# Patient Record
Sex: MALE | Race: WHITE
[De-identification: names, ages, dates, MRNs, and addresses within clinical notes are randomized per-mention and may not be internally consistent; named-entity substitution may affect disease eponyms.]

---

## 2017-01-23 ENCOUNTER — HOSPITAL ENCOUNTER (EMERGENCY)
Dept: HOSPITAL 36 - ER | Age: 52
LOS: 1 days | Discharge: NURSING, REHAB OR LONG-TERM CARE | End: 2017-01-24
Payer: MEDICARE

## 2017-01-23 DIAGNOSIS — Z86.73: ICD-10-CM

## 2017-01-23 DIAGNOSIS — E11.9: ICD-10-CM

## 2017-01-23 DIAGNOSIS — E66.9: ICD-10-CM

## 2017-01-23 DIAGNOSIS — I10: ICD-10-CM

## 2017-01-23 DIAGNOSIS — J44.9: ICD-10-CM

## 2017-01-23 DIAGNOSIS — R07.89: Primary | ICD-10-CM

## 2017-01-23 DIAGNOSIS — I25.10: ICD-10-CM

## 2017-01-23 DIAGNOSIS — F41.9: ICD-10-CM

## 2017-01-23 DIAGNOSIS — J45.909: ICD-10-CM

## 2017-01-23 LAB
ALBUMIN/GLOB SERPL: 1.5 {RATIO} (ref 1–1.8)
ALP SERPL-CCNC: 77 U/L (ref 34–104)
ALT SERPL-CCNC: 46 U/L (ref 7–52)
ANION GAP SERPL CALC-SCNC: 10.5 MMOL/L (ref 7–16)
AST SERPL-CCNC: 30 U/L (ref 13–39)
BACTERIA #/AREA URNS HPF: (no result) /HPF
BILIRUB SERPL-MCNC: 0.3 MG/DL (ref 0.3–1)
BILIRUB UR-MCNC: NEGATIVE MG/DL
BUN SERPL-MCNC: 15 MG/DL (ref 7–25)
BUN/CREAT SERPL: 18.8
CALCIUM SERPL-MCNC: 9.4 MG/DL (ref 8.6–10.3)
CHLORIDE SERPL-SCNC: 98 MEQ/L (ref 98–107)
CHOLEST SERPL-MCNC: 246 MG/DL (ref ?–200)
CO2 SERPL-SCNC: 25.4 MEQ/L (ref 21–31)
COLOR UR: YELLOW
CREAT SERPL-MCNC: 0.8 MG/DL (ref 0.7–1.3)
EOSINOPHIL NFR BLD: 1 % (ref 0–5)
EPI CELLS URNS QL MICRO: (no result) /LPF
ERYTHROCYTE [DISTWIDTH] IN BLOOD BY AUTOMATED COUNT: 12.8 % (ref 11.5–20)
GLOBULIN SER-MCNC: 3 GM/DL
GLUCOSE SERPL-MCNC: 305 MG/DL (ref 70–105)
GLUCOSE UR STRIP-MCNC: 500 MG/DL
HCT VFR BLD CALC: 39.9 % (ref 39–49)
HGB BLD-MCNC: 13.9 GM/DL (ref 13.2–17.3)
INR PPP: 1.01 (ref 0.5–1.4)
KETONES UR STRIP-MCNC: (no result) MG/DL
MCH RBC QN AUTO: 30 PG (ref 26–30)
MCHC RBC AUTO-ENTMCNC: 34.7 PG (ref 28–36)
MCV RBC AUTO: 86.5 FL (ref 80–99)
NEUTROPHILS NFR BLD AUTO: 52 % (ref 40–80)
NEUTS BAND NFR BLD: 2 % (ref 0–10)
PH UR STRIP: 5.5 [PH]
PLATELET # BLD EST: ADEQUATE 10*3/UL
PLATELET # BLD: 230 TH/CMM (ref 150–400)
PMV BLD AUTO: 8.1 FL
POTASSIUM SERPL-SCNC: 3.9 MEQ/L (ref 3.5–5.1)
PROT UR STRIP-MCNC: NEGATIVE MG/DL
PROTHROMBIN TIME: 10 SECONDS (ref 9.5–11.5)
RBC # BLD AUTO: 4.61 MIL/CMM (ref 4.3–5.7)
RBC # UR STRIP: NEGATIVE /UL
RBC #/AREA URNS HPF: (no result) /HPF (ref 0–5)
SODIUM SERPL-SCNC: 130 MEQ/L (ref 136–145)
TOTAL CELLS COUNTED BLD: 100
TRIGL SERPL-MCNC: 1200 MG/DL (ref ?–150)
UROBILINOGEN UR STRIP-ACNC: 0.2 E.U./DL (ref 0.2–1)
WBC # BLD AUTO: 6.2 TH/CMM (ref 4.8–10.8)
WBC #/AREA URNS HPF: (no result) /HPF (ref 0–5)

## 2017-01-23 PROCEDURE — 93005 ELECTROCARDIOGRAM TRACING: CPT

## 2017-01-23 PROCEDURE — 84443 ASSAY THYROID STIM HORMONE: CPT

## 2017-01-23 PROCEDURE — 99285 EMERGENCY DEPT VISIT HI MDM: CPT

## 2017-01-23 PROCEDURE — 80053 COMPREHEN METABOLIC PANEL: CPT

## 2017-01-23 PROCEDURE — 36415 COLL VENOUS BLD VENIPUNCTURE: CPT

## 2017-01-23 PROCEDURE — 85007 BL SMEAR W/DIFF WBC COUNT: CPT

## 2017-01-23 PROCEDURE — 85027 COMPLETE CBC AUTOMATED: CPT

## 2017-01-23 PROCEDURE — 83036 HEMOGLOBIN GLYCOSYLATED A1C: CPT

## 2017-01-23 PROCEDURE — 80061 LIPID PANEL: CPT

## 2017-01-23 PROCEDURE — 84484 ASSAY OF TROPONIN QUANT: CPT

## 2017-01-23 PROCEDURE — 86592 SYPHILIS TEST NON-TREP QUAL: CPT

## 2017-01-23 PROCEDURE — 81001 URINALYSIS AUTO W/SCOPE: CPT

## 2017-01-23 PROCEDURE — 71010: CPT

## 2017-01-23 PROCEDURE — 87040 BLOOD CULTURE FOR BACTERIA: CPT

## 2017-01-23 PROCEDURE — 76700 US EXAM ABDOM COMPLETE: CPT

## 2017-01-23 PROCEDURE — 85610 PROTHROMBIN TIME: CPT
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
## 2017-01-24 LAB — HGB BLD-MCNC: 14 G/DL (ref 4–35)

## 2017-01-24 NOTE — DIAGNOSTIC IMAGING REPORT
Portable chest x-ray



HISTORY: Pain



The overall heart size is difficult to assess with portable technique.

Elevation the right hemidiaphragm. No acute focal pulmonary processes.

No hilar or mediastinal abnormalities. Degenerative changes seen to the

spine.



IMPRESSION:

1. No acute focal pulmonary processes

2. Elevation of the right hemidiaphragm

## 2017-01-24 NOTE — DIAGNOSTIC IMAGING REPORT
Abdominal ultrasound



HISTORY: Pain



Exam is very limited due to a large amount of bowel gas.



The liver appears enlarged. No obvious focal lesions. Incomplete

delineation of the entire liver. The gallbladder is rather markedly

distended. No definite intraluminal abnormalities. Significance should

be correlated clinically. The common bile duct could not be visualized.

No intrahepatic biliary dilatation. The kidneys appear increased in size

bilaterally. No focal lesions or hydronephrosis. The spleen is enlarged.

No other obvious retroperitoneal or intra-abdominal abnormalities.



IMPRESSION:

1. Very Limited/suboptimal exam due to considerable bowel gas

2. Hepatosplenomegaly

3. Dilated gallbladder. Significance and etiology is uncertain.

4. Increased renal sizes bilaterally. No hydronephrosis. Significance

should be correlated with renal function tests.

## 2017-02-17 ENCOUNTER — HOSPITAL ENCOUNTER (INPATIENT)
Dept: HOSPITAL 36 - ER | Age: 52
LOS: 3 days | Discharge: NURSING, REHAB OR LONG-TERM CARE | DRG: 392 | End: 2017-02-20
Attending: FAMILY MEDICINE | Admitting: FAMILY MEDICINE
Payer: MEDICARE

## 2017-02-17 DIAGNOSIS — N40.0: ICD-10-CM

## 2017-02-17 DIAGNOSIS — K27.9: ICD-10-CM

## 2017-02-17 DIAGNOSIS — Z90.49: ICD-10-CM

## 2017-02-17 DIAGNOSIS — K57.30: ICD-10-CM

## 2017-02-17 DIAGNOSIS — I69.354: ICD-10-CM

## 2017-02-17 DIAGNOSIS — Z82.49: ICD-10-CM

## 2017-02-17 DIAGNOSIS — E66.9: ICD-10-CM

## 2017-02-17 DIAGNOSIS — R10.9: ICD-10-CM

## 2017-02-17 DIAGNOSIS — E11.65: ICD-10-CM

## 2017-02-17 DIAGNOSIS — Z79.4: ICD-10-CM

## 2017-02-17 DIAGNOSIS — I10: ICD-10-CM

## 2017-02-17 DIAGNOSIS — E78.5: ICD-10-CM

## 2017-02-17 DIAGNOSIS — K29.70: Primary | ICD-10-CM

## 2017-02-17 DIAGNOSIS — R16.0: ICD-10-CM

## 2017-02-17 DIAGNOSIS — Z83.3: ICD-10-CM

## 2017-02-17 LAB
ANION GAP SERPL CALC-SCNC: 6.3 MMOL/L (ref 7–16)
BASOPHILS NFR BLD AUTO: 0.4 % (ref 0–2)
BUN SERPL-MCNC: 19 MG/DL (ref 7–25)
BUN/CREAT SERPL: 27.1
CALCIUM SERPL-MCNC: 8.8 MG/DL (ref 8.6–10.3)
CHLORIDE SERPL-SCNC: 103 MEQ/L (ref 98–107)
CO2 SERPL-SCNC: 26.4 MEQ/L (ref 21–31)
CREAT SERPL-MCNC: 0.7 MG/DL (ref 0.7–1.3)
EOSINOPHIL NFR BLD AUTO: 2.8 % (ref 0–5)
ERYTHROCYTE [DISTWIDTH] IN BLOOD BY AUTOMATED COUNT: 12.8 % (ref 11.5–20)
GLUCOSE SERPL-MCNC: 269 MG/DL (ref 70–105)
HCT VFR BLD CALC: 38.5 % (ref 39–49)
HGB BLD-MCNC: 12 G/DL (ref 4–35)
HGB BLD-MCNC: 13.2 GM/DL (ref 13.2–17.3)
LYMPHOCYTES NFR BLD AUTO: 30.1 % (ref 20–50)
MCH RBC QN AUTO: 29.7 PG (ref 26–30)
MCHC RBC AUTO-ENTMCNC: 34.2 PG (ref 28–36)
MCV RBC AUTO: 86.6 FL (ref 80–99)
MONOCYTES NFR BLD AUTO: 12.5 % (ref 2–10)
NEUTROPHILS # BLD: 3.3 TH/CMM (ref 1.8–8)
NEUTROPHILS NFR BLD AUTO: 54.2 % (ref 40–80)
PLATELET # BLD: 252 TH/CMM (ref 150–400)
PMV BLD AUTO: 7.9 FL
POTASSIUM SERPL-SCNC: 3.7 MEQ/L (ref 3.5–5.1)
RBC # BLD AUTO: 4.45 MIL/CMM (ref 4.3–5.7)
SODIUM SERPL-SCNC: 132 MEQ/L (ref 136–145)
WBC # BLD AUTO: 6.1 TH/CMM (ref 4.8–10.8)

## 2017-02-17 PROCEDURE — Z7610: HCPCS

## 2017-02-17 RX ADMIN — INSULIN ASPART SCH UNITS: 100 INJECTION, SOLUTION INTRAVENOUS; SUBCUTANEOUS at 23:24

## 2017-02-17 RX ADMIN — INSULIN ASPART SCH UNITS: 100 INJECTION, SOLUTION INTRAVENOUS; SUBCUTANEOUS at 11:49

## 2017-02-17 RX ADMIN — INSULIN ASPART SCH UNITS: 100 INJECTION, SOLUTION INTRAVENOUS; SUBCUTANEOUS at 17:33

## 2017-02-17 RX ADMIN — HYDROCODONE BITATRATE AND ACETAMINOPHEN PRN TAB: 10; 325 TABLET ORAL at 11:46

## 2017-02-17 RX ADMIN — HYDROCODONE BITATRATE AND ACETAMINOPHEN PRN TAB: 10; 325 TABLET ORAL at 18:20

## 2017-02-17 RX ADMIN — INSULIN ASPART SCH UNITS: 100 INJECTION, SOLUTION INTRAVENOUS; SUBCUTANEOUS at 11:46
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
## 2017-02-17 NOTE — ADMIT CRITERIA FORM
Admit Criteria Forms





- Admit Criteria


Diagnosis: 





                                                                         


                           ABDOMINAL PAIN





Clinical Indications for Admission to Inpatient Care


 


                                                                               

      (Place 'X' for any and all applicable criteria):





Admission is indicated for ANY ONE of the following(1)(2)(3)(4)(5):


[X ]I.      Inpatient admission required rather than observation care (Also use 

Abdominal Pain: Observation Care, 


           as appropriate) because of ANY ONE of the following:


            [ ]a)   Severe pain requiring acute inpatient management


            [X ]b)    Identification of etiology/finding that requires 

inpatient care (eg, aortic dissection, free air)


            [ ]c)    Absent bowel sounds with complete ileus(6)  


            [ ]d)    Suspected toxic megacolon


            [ ]e)    Severe electrolyte abnormalities requiring inpatient care


            [ ]f)     High fever or infection requiring inpatient admission as 

indicated by ANY ONE of following(7)(8):


                       [ ] i)    Appropriate outpatient or observational care 

antimicrobial treatment unavailable, not effective, or not feasible


                       [ ] ii)   Documented bacteremia 


                       [ ] iii)  Temperature > 104.9 degrees F (oral)


                       [ ] iv)  T >103.1 F (oral) or < 96.8 F(rectal) that does 

not respond to all emergency treatment measures


            [ ]g)     Signs of intestinal obstruction [B] 


            [ ]h)     Hemodynamic instability


            [ ]i)      IV fluid to replace significant ongoing losses (greater 

than 3 L/m2 per day) (12)(13)


            [ ]j)      Percutaneous or open drainage (eg, abscess, biliary tract

) procedures


            [ ]k)     Parenteral nutrition regimen that must be implemented on 

inpatient basis   


            [ ]l)      Other condition,treatment or monitoring requiring 

inpatient admission.


[ ]II.      Peritoneal signs present


[ ]III.     Surgery needed that cannot be performed on an ambulatory basis.


[ ]IV.    Evaluation requires patient to not eat or drink for extended period (

eg, more than 24 hours).





[ ]V.     Contraindications and/or Inappropriate clinical situations for 

Observational Care in patients with


           abdominal pain, when ANY ONE of the following is required:


           [ ]a)  Thorough evaluation is required to prevent catastrophic 

events due to delays in diagnosing  


                   (e.g.Mesenteric ischemia) 1,3


           [ ]b)  Patient with severe pathology or with chronic symptoms 

unlikely to improve in the ED stay (3)


[ ]VI.    General contraindications and/or Inappropriate clinical situations 

for Observational Care in patients


           with abdominal pain, when ANY ONE of the following is required:


           [ ]a)   Prediction of prolongation of LOS based on ANY ONE of the 

following may be considered as 


                    a contraindication for observational care 2, 3, 4, 5, 6, 7, 

8, 9, 10, 11


                    [ ]i)    Age > 65 yrs.


                    [ ]ii)   Patient arriving by ambulance


                    [ ]iii)  Patient with high acuity


                    [ ]iv)  Patient requiring vital sign monitoring


                    [ ]v)   Patient on IV medication


           [ ]b)   Systolic blood pressures  180mmHg 3,12


           [ ]c)   Patient with altered mental status including delirium and 

other alteration of consciousness, (3)


           [ ]d)   Patient whose discharge disposition will be to a skilled 

nursing home or rehabilitation home should 


                    not be managed in Emergency Department Observation Unit. 

CMS rule requires 3 days hospital stay before such placement.3,13


           [ ]e)   Patient with failure to thrive due to broad array of 

etiologies 3,16,17


           [ ]f)    Inability to ambulate 3,14








Extended stay beyond goal length of stay may be needed for(2)(3):


[ ]a)   Persistent abdominal pain with suspected intra-abdominal process


[ ]b)   Diagnosed condition requiring continued stay (e.g., pancreatitis, 

complicated diverticulitis)                                            


[ ]c)   Surgery (e.g., colectomy)                


    





The original [facility] content created by [employer] has been revised. 


The portions of the content which have been revised are identified through the 

use of italic text or in bold, and [facility] 


has neither reviewed nor approved the modified material.All other unmodified 

content is copyright  [facility].





Please see references footnoted in the original [facility] edition 

2016


Admit Criteria Met?: Yes
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
## 2017-02-18 LAB
ALBUMIN/GLOB SERPL: 1.6 {RATIO} (ref 1–1.8)
ALP SERPL-CCNC: 62 U/L (ref 34–104)
ALT SERPL-CCNC: 36 U/L (ref 7–52)
ANION GAP SERPL CALC-SCNC: 8.9 MMOL/L (ref 7–16)
AST SERPL-CCNC: 27 U/L (ref 13–39)
BACTERIA #/AREA URNS HPF: (no result) /HPF
BASOPHILS NFR BLD AUTO: 1.1 % (ref 0–2)
BILIRUB DIRECT SERPL-MCNC: 0.07 MG/DL (ref 0–0.2)
BILIRUB SERPL-MCNC: 0.3 MG/DL (ref 0.3–1)
BILIRUB UR-MCNC: NEGATIVE MG/DL
BUN SERPL-MCNC: 15 MG/DL (ref 7–25)
BUN/CREAT SERPL: 25
CALCIUM SERPL-MCNC: 9 MG/DL (ref 8.6–10.3)
CHLORIDE SERPL-SCNC: 100 MEQ/L (ref 98–107)
CHOLEST SERPL-MCNC: 247 MG/DL (ref ?–200)
CO2 SERPL-SCNC: 25.7 MEQ/L (ref 21–31)
COLOR UR: YELLOW
CREAT SERPL-MCNC: 0.6 MG/DL (ref 0.7–1.3)
EOSINOPHIL NFR BLD AUTO: 2.8 % (ref 0–5)
EPI CELLS URNS QL MICRO: (no result) /LPF
ERYTHROCYTE [DISTWIDTH] IN BLOOD BY AUTOMATED COUNT: 12.6 % (ref 11.5–20)
GLOBULIN SER-MCNC: 2.5 GM/DL
GLUCOSE SERPL-MCNC: 261 MG/DL (ref 70–105)
GLUCOSE UR STRIP-MCNC: 500 MG/DL
HCT VFR BLD CALC: 39 % (ref 39–49)
HGB BLD-MCNC: 13.5 GM/DL (ref 13.2–17.3)
KETONES UR STRIP-MCNC: NEGATIVE MG/DL
LYMPHOCYTES NFR BLD AUTO: 30.2 % (ref 20–50)
MCH RBC QN AUTO: 30 PG (ref 26–30)
MCHC RBC AUTO-ENTMCNC: 34.6 PG (ref 28–36)
MCV RBC AUTO: 86.7 FL (ref 80–99)
MONOCYTES NFR BLD AUTO: 10.6 % (ref 2–10)
NEUTROPHILS # BLD: 3.2 TH/CMM (ref 1.8–8)
NEUTROPHILS NFR BLD AUTO: 55.3 % (ref 40–80)
PH UR STRIP: 6 [PH]
PLATELET # BLD: 245 TH/CMM (ref 150–400)
PMV BLD AUTO: 8.1 FL
POTASSIUM SERPL-SCNC: 3.6 MEQ/L (ref 3.5–5.1)
PROT UR STRIP-MCNC: NEGATIVE MG/DL
RBC # BLD AUTO: 4.49 MIL/CMM (ref 4.3–5.7)
RBC # UR STRIP: NEGATIVE /UL
RBC #/AREA URNS HPF: (no result) /HPF (ref 0–5)
SODIUM SERPL-SCNC: 131 MEQ/L (ref 136–145)
TRIGL SERPL-MCNC: 696 MG/DL (ref ?–150)
UROBILINOGEN UR STRIP-ACNC: 0.2 E.U./DL (ref 0.2–1)
WBC # BLD AUTO: 5.9 TH/CMM (ref 4.8–10.8)
WBC #/AREA URNS HPF: (no result) /HPF (ref 0–5)

## 2017-02-18 RX ADMIN — HYDROCODONE BITATRATE AND ACETAMINOPHEN PRN TAB: 10; 325 TABLET ORAL at 20:24

## 2017-02-18 RX ADMIN — INSULIN ASPART SCH UNITS: 100 INJECTION, SOLUTION INTRAVENOUS; SUBCUTANEOUS at 06:33

## 2017-02-18 RX ADMIN — HYDROCODONE BITATRATE AND ACETAMINOPHEN PRN TAB: 10; 325 TABLET ORAL at 06:39

## 2017-02-18 RX ADMIN — HYDROCODONE BITATRATE AND ACETAMINOPHEN PRN TAB: 10; 325 TABLET ORAL at 11:35

## 2017-02-18 RX ADMIN — INSULIN ASPART SCH UNITS: 100 INJECTION, SOLUTION INTRAVENOUS; SUBCUTANEOUS at 11:31

## 2017-02-18 RX ADMIN — PANTOPRAZOLE SODIUM SCH MG: 40 TABLET, DELAYED RELEASE ORAL at 06:33

## 2017-02-18 RX ADMIN — INSULIN ASPART SCH UNITS: 100 INJECTION, SOLUTION INTRAVENOUS; SUBCUTANEOUS at 22:02

## 2017-02-18 RX ADMIN — INSULIN ASPART SCH UNITS: 100 INJECTION, SOLUTION INTRAVENOUS; SUBCUTANEOUS at 16:45
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
## 2017-02-18 NOTE — HISTORY & PHYSICAL
This note covering Dr. Varela.



CHIEF COMPLAINT:  Epigastric pain for the last 2 months, getting worse recently.



HISTORY OF PRESENT ILLNESS:  The patient is a 51-year-old male with a past

medical history of hypertension, diabetes mellitus, CVA 1 year ago with residual

left-sided weakness, presented with epigastric pain of 2 months, getting worse

recently.  It is stabbing in nature.  On initial evaluation, the patient's

temperature 98.1 degree Fahrenheit and WBC count was 6100.  Creatinine was 0.7. 

Glucose is 269.  HbA1c 11.3%.  CT scan of the abdomen and pelvis performed and

it showed colonic diverticulosis without evidence of diverticulitis.  GI

consultation and Cardiology consultation were called.



ALLERGIES:  NKDA.



MEDICATIONS:  See medication reconciliation sheet.



PAST MEDICAL HISTORY:  Includes diabetes mellitus type 2, hypertension, CVA with

left-sided weakness 1 year ago.



FAMILY HISTORY:  Heart disease, diabetes mellitus and hypertension.



SOCIAL HISTORY:  Denies any smoking, alcohol or drug use.



PAST SURGICAL HISTORY:  The patient had laparoscopic surgery.  The patient had

exploratory laparotomy performed for diaphragmatic hernia, repair of spleen and

colon, mandibular fracture, appendectomy.



PSYCHIATRIC HISTORY:  None.



MEDICATIONS:  As per medication reconciliation sheet.



REVIEW OF SYSTEMS:

GENERAL:  The patient denies any fever or chills.

HEENT:  Denies diplopia, photophobia, sore throat.

RESPIRATORY:  Denies any cough or shortness of breath.

CARDIOVASCULAR:  Denies any chest pain, no palpitation.

GASTROINTESTINAL:  The patient has epigastric abdominal pain.  Denies any

nausea, vomiting, diarrhea, hematemesis or hematochezia.

GENITOURINARY:  Denies dysuria or hematuria.

NEUROLOGIC:  Denies headache, dizziness, or focal weakness.



PHYSICAL EXAMINATION:

VITAL SIGNS:  Shows temperature is 98.4 degrees Fahrenheit, pulse 71,

respirations 19, blood pressure 163/94.

GENERAL:  The patient is comfortable lying in the bed, not in acute distress.

HEENT:  Head is normocephalic, atraumatic.  Oral cavity moist, pink tongue. 

Eyes:  No pallor, no icterus.  PERRLA, EOMI.

NECK:  Supple, no JVD, no carotid bruit.  Trachea in midline.

CHEST:  Bilateral breath sounds.  No crackles or wheezing.

HEART:  S1, S2 within normal limits.  Regular rhythm.  No murmur.  No gallop.

ABDOMEN:  Soft.  The patient has mild tenderness on epigastric area.  The

patient has healed old surgical scar from xiphoid process to the suprapubic area

healed completely, no opening.  Bowel sounds present.

EXTREMITIES:  No cyanosis, no clubbing, no edema.

NEUROLOGICAL:  Alert, awake, oriented x 3.



LABORATORY DATA:  Current lab shows WBC count is 6100, hemoglobin 13.2,

hematocrit 38.5, platelets are 252,000, neutrophil is 54.2%.  Sodium 132,

potassium 3.7, chloride ____, bicarb 26, BUN is 19, creatinine 0.7, glucose is

269.  Urinalysis shows negative nitrite, negative leuko esterase.  CT scan of

the abdomen and pelvis suggested diverticulosis without evidence of

diverticulitis.



IMPRESSION:

1.  Abdominal pain, rule out peptic ulcer disease.

2.  Diabetes mellitus, uncontrolled.

3.  Hypertension.

4.  History of cerebrovascular accident.

5.  Obesity.



RECOMMENDATIONS:  Continue same medications.  GI consultation and Cardiology

consultations were called.





DD: 02/18/2017 13:02

DT: 02/18/2017 13:58

JOB# [id number]  [id number]

## 2017-02-18 NOTE — GENERAL PROGRESS NOTE
Subjective





- Review of Systems


Service Date: 02/18/17


Subjective: 





[id number]





Objective





- Results


Result Diagrams: 


 02/18/17 06:25





 02/18/17 06:25


Recent Labs: 


 Laboratory Last Values











WBC  5.9 Th/cmm (4.8-10.8)   02/18/17  06:25    


 


RBC  4.49 Mil/cmm (4.30-5.70)   02/18/17  06:25    


 


Hgb  13.5 gm/dL (13.2-17.3)   02/18/17  06:25    


 


Hct  39.0 % (39.0-49.0)   02/18/17  06:25    


 


MCV  86.7 fl (80-99)   02/18/17  06:25    


 


MCH  30.0 pg (26.0-30.0)   02/18/17  06:25    


 


MCHC Differential  34.6 pg (28.0-36.0)   02/18/17  06:25    


 


RDW  12.6 % (11.5-20.0)   02/18/17  06:25    


 


Plt Count  245 Th/cmm (150-400)   02/18/17  06:25    


 


MPV  8.1 fl  02/18/17  06:25    


 


Neutrophils %  55.3 % (40.0-80.0)   02/18/17  06:25    


 


Lymphocytes %  30.2 % (20.0-50.0)   02/18/17  06:25    


 


Monocytes %  10.6 % (2.0-10.0)  H  02/18/17  06:25    


 


Eosinophils %  2.8 % (0.0-5.0)   02/18/17  06:25    


 


Basophils %  1.1 % (0.0-2.0)   02/18/17  06:25    


 


ESR  33 mm/hr (0-20)  H  02/18/17  06:25    


 


Sodium  131 mEq/L (136-145)  L  02/18/17  06:25    


 


Potassium  3.6 mEq/L (3.5-5.1)   02/18/17  06:25    


 


Chloride  100 mEq/L ()   02/18/17  06:25    


 


Carbon Dioxide  25.7 mEq/L (21.0-31.0)   02/18/17  06:25    


 


Anion Gap  8.9  (7.0-16.0)   02/18/17  06:25    


 


BUN  15 mg/dL (7-25)   02/18/17  06:25    


 


Creatinine  0.6 mg/dL (0.7-1.3)  L  02/18/17  06:25    


 


Est GFR ( Amer)  > 60.0 ml/min (>90)   02/18/17  06:25    


 


Est GFR (Non-Af Amer)  > 60.0 ml/min  02/18/17  06:25    


 


BUN/Creatinine Ratio  25.0   02/18/17  06:25    


 


Glucose  261 mg/dL ()  H  02/18/17  06:25    


 


POC Glucose  283 MG/DL (70 - 105)  H  02/18/17  11:04    


 


Hemoglobin A1c %  11.3 % (4.0-6.0)  H  02/17/17  06:30    


 


Calcium  9.0 mg/dL (8.6-10.3)   02/18/17  06:25    


 


Total Bilirubin  0.3 mg/dL (0.3-1.0)   02/18/17  06:25    


 


Direct Bilirubin  0.07 mg/dL (0.0-0.2)   02/18/17  06:25    


 


AST  27 U/L (13-39)   02/18/17  06:25    


 


ALT  36 U/L (7-52)   02/18/17  06:25    


 


Alkaline Phosphatase  62 U/L ()   02/18/17  06:25    


 


Total Protein  6.5 gm/dL (6.0-8.3)   02/18/17  06:25    


 


Albumin  4.0 gm/dL (4.2-5.5)  L  02/18/17  06:25    


 


Globulin  2.5 gm/dL  02/18/17  06:25    


 


Albumin/Globulin Ratio  1.6  (1.0-1.8)   02/18/17  06:25    


 


Triglycerides  696 mg/dL (<150)  H  02/18/17  06:25    


 


Cholesterol  247 mg/dL (<200)  H  02/18/17  06:25    


 


LDL Cholesterol Direct  137 mg/dL ()   02/18/17  06:25    


 


HDL Cholesterol  24 mg/dL (23-92)   02/18/17  06:25    


 


Lipase  70 U/L (11-82)   02/17/17  06:30    


 


TSH  2.23 uIU/ml (0.34-5.60)   02/18/17  06:25    


 


Urine Source  RANDOM   02/18/17  01:54    


 


Urine Color  YELLOW   02/18/17  01:54    


 


Urine Clarity  CLEAR  (CLEAR)   02/18/17  01:54    


 


Urine pH  6.0   02/18/17  01:54    


 


Ur Specific Gravity  1.010  (1.005-1.030)   02/18/17  01:54    


 


Urine Protein  NEGATIVE mg/dL (NEGATIVE)   02/18/17  01:54    


 


Urine Glucose (UA)  500 mg/dL (NEGATIVE)  H  02/18/17  01:54    


 


Urine Ketones  NEGATIVE mg/dL (NEGATIVE)   02/18/17  01:54    


 


Urine Blood  NEGATIVE  (NEGATIVE)   02/18/17  01:54    


 


Urine Nitrate  NEGATIVE  (NEGATIVE)   02/18/17  01:54    


 


Urine Bilirubin  NEGATIVE  (NEGATIVE)   02/18/17  01:54    


 


Urine Urobilinogen  0.2 E.U./dL (0.2 - 1.0)   02/18/17  01:54    


 


Ur Leukocyte Esterase  NEGATIVE  (NEGATIVE)   02/18/17  01:54    


 


Urine RBC  NONE SEEN /hpf (0-5)   02/18/17  01:54    


 


Urine WBC  NONE SEEN /hpf (0-5)   02/18/17  01:54    


 


Ur Epithelial Cells  NONE SEEN /lpf (FEW)   02/18/17  01:54    


 


Urine Bacteria  NONE SEEN /hpf (NONE SEEN)   02/18/17  01:54    














- Physical Exam


Vitals and I&O: 


 Vital Signs











Temp  98.4 F   02/18/17 12:00


 


Pulse  71   02/18/17 12:00


 


Resp  19   02/18/17 12:00


 


BP  163/94   02/18/17 12:00


 


Pulse Ox  96   02/18/17 12:00








 Intake & Output











 02/17/17 02/18/17 02/18/17





 18:59 06:59 18:59


 


Intake Total  1100 


 


Balance  1100 


 


Intake:   


 


  Oral  1100 


 


Other:   


 


  # Voids  2 


 


  Stool Characteristics   Soft











Active Medications: 


 Current Medications





Acetaminophen (Tylenol)  650 mg PO Q4H PRN


   PRN Reason: pain or fever


   Stop: 04/18/17 15:09


Acetaminophen/Hydrocodone Bitart (Norco 10 Mg/325 Mg)  1 tab PO Q4H PRN


   PRN Reason: abdominal pain scale of 5-8


   Stop: 04/18/17 11:26


   Last Admin: 02/18/17 11:35 Dose:  1 tab


Clopidogrel Bisulfate (Plavix)  75 mg PO DAILY [facility]


   Stop: 04/19/17 08:59


   Last Admin: 02/18/17 08:28 Dose:  75 mg


Famotidine (Pepcid)  20 mg PO BID [facility]


   Stop: 04/18/17 16:59


   Last Admin: 02/18/17 08:28 Dose:  20 mg


Furosemide (Lasix)  40 mg PO DAILY [facility]


   Stop: 04/19/17 08:59


   Last Admin: 02/18/17 08:28 Dose:  40 mg


Gabapentin (Neurontin)  600 mg PO BID [facility]


   Stop: 04/18/17 16:59


   Last Admin: 02/18/17 08:29 Dose:  600 mg


Gemfibrozil (Lopid)  600 mg PO BIDAC [facility]


   Stop: 04/18/17 16:29


   Last Admin: 02/18/17 06:33 Dose:  600 mg


Hydralazine HCl (Apresoline)  25 mg PO TID [facility]


   Stop: 04/18/17 15:59


   Last Admin: 02/18/17 08:28 Dose:  25 mg


Insulin Aspart (Novolog Insulin Sliding Scale)  0 units SUBQ ACHS [facility]


   PRN Reason: Protocol


   Stop: 04/19/17 11:29


   Last Admin: 02/18/17 11:31 Dose:  7 units


Losartan Potassium (Cozaar)  100 mg PO DAILY [facility]


   Stop: 04/18/17 16:59


   Last Admin: 02/18/17 08:27 Dose:  100 mg


Magnesium Hydroxide (Milk Of Magnesia)  30 ml PO HS PRN


   PRN Reason: Constipation


   Stop: 04/18/17 15:15


Metformin HCl (Glucophage)  850 mg PO BIDWM [facility]


   Stop: 04/18/17 17:59


   Last Admin: 02/18/17 08:27 Dose:  850 mg


Methocarbamol (Robaxin)  500 mg PO TID PRN


   PRN Reason: MUSCLE SPASM


   Stop: 04/18/17 15:08


Pantoprazole Sodium (Protonix)  40 mg PO QDAC [facility]


   Stop: 04/19/17 07:29


   Last Admin: 02/18/17 06:33 Dose:  40 mg


Zolpidem Tartrate (Ambien)  10 mg PO HS PRN


   PRN Reason: Insomnia


   Stop: 04/18/17 20:51


   Last Admin: 02/17/17 21:06 Dose:  10 mg

## 2017-02-19 RX ADMIN — HYDROCODONE BITATRATE AND ACETAMINOPHEN PRN TAB: 10; 325 TABLET ORAL at 13:39

## 2017-02-19 RX ADMIN — HYDROCODONE BITATRATE AND ACETAMINOPHEN PRN TAB: 10; 325 TABLET ORAL at 20:27

## 2017-02-19 RX ADMIN — INSULIN ASPART SCH UNITS: 100 INJECTION, SOLUTION INTRAVENOUS; SUBCUTANEOUS at 16:27

## 2017-02-19 RX ADMIN — PANTOPRAZOLE SODIUM SCH: 40 TABLET, DELAYED RELEASE ORAL at 06:35

## 2017-02-19 RX ADMIN — INSULIN ASPART SCH: 100 INJECTION, SOLUTION INTRAVENOUS; SUBCUTANEOUS at 06:34

## 2017-02-19 RX ADMIN — INSULIN ASPART SCH UNITS: 100 INJECTION, SOLUTION INTRAVENOUS; SUBCUTANEOUS at 20:27

## 2017-02-19 RX ADMIN — INSULIN ASPART SCH: 100 INJECTION, SOLUTION INTRAVENOUS; SUBCUTANEOUS at 11:14

## 2017-02-19 NOTE — CONSULTATION
The patient of Dr. Varela.



HISTORY AND PHYSICAL:  This is a 51-year-old male patient who has been

complaining of abdominal pain, no diarrhea and no rectal bleeding.  Following

this the patient came to the Emergency Room.  The patient's cardiac consult is

requested in view of hypertension.



PAST MEDICAL HISTORY:  Diabetes mellitus type 2, CVA with late effect,

left-sided weakness, walks with a walker, diverticulosis, peptic ulcer disease

and BPH.



FAMILY HISTORY:  Unremarkable.



SOCIAL HISTORY:  No history of smoking, alcohol abuse.



ALLERGIES:  No known allergies.



PHYSICAL EXAMINATION:

VITAL SIGNS:  Blood pressure 130/80, pulse 80 and respirations 20.

HEAD:  Normocephalic.  No lumps or bumps.

EYES:  Pupils equal, reactive to light.  Fundi show AV nicking, sclerae white,

conjunctivae pink.

NECK:  Carotid 2+.  Normal upstroke.  JVD flat.  Thyroid not palpable.  Lymph

nodes not palpable.

CHEST:  Shows increased AP diameter.  No kyphosis, scoliosis.

LUNGS:  Bilateral bronchovascular breath sounds.  Occasional wheeze.  No rales.

HEART:  PMI fifth intercostal space with lateral-to-midclavicular line.  S1 and

S2.  No S3, S4, soft systolic murmur.

ABDOMEN:  Soft.  There is tenderness in epigastric area, left lower quadrant. 

No rebound tenderness.

NEUROLOGIC:  Left-sided weakness.



CLINICAL IMPRESSION:

1.  Peptic ulcer disease, hypertension, diabetes mellitus type 2, CVA with late

effect left-sided weakness and diverticulosis.



PLAN:  The patient to continue present management, IV antibiotics and control

the blood pressure.





DD: 02/18/2017 20:13

DT: 02/19/2017 18:02

JOB# [id number]  [id number]

## 2017-02-19 NOTE — INFECTIOUS DISEASE PROG NOTE
Infectious Disease Subjective





- Review of Systems


Service Date: 17


Subjective: 





C/o abdominal pain. 





Infectious Disease Objective





- Results


Result Diagrams: 


 17 06:25





 17 06:25


Recent Labs: 


 Laboratory Last Values











WBC  5.9 Th/cmm (4.8-10.8)   17  06:25    


 


RBC  4.49 Mil/cmm (4.30-5.70)   17  06:25    


 


Hgb  13.5 gm/dL (13.2-17.3)   17  06:25    


 


Hct  39.0 % (39.0-49.0)   17  06:25    


 


MCV  86.7 fl (80-99)   17  06:25    


 


MCH  30.0 pg (26.0-30.0)   17  06:25    


 


MCHC Differential  34.6 pg (28.0-36.0)   17  06:25    


 


RDW  12.6 % (11.5-20.0)   17  06:25    


 


Plt Count  245 Th/cmm (150-400)   17  06:25    


 


MPV  8.1 fl  17  06:25    


 


Neutrophils %  55.3 % (40.0-80.0)   17  06:25    


 


Lymphocytes %  30.2 % (20.0-50.0)   17  06:25    


 


Monocytes %  10.6 % (2.0-10.0)  H  17  06:25    


 


Eosinophils %  2.8 % (0.0-5.0)   17  06:25    


 


Basophils %  1.1 % (0.0-2.0)   17  06:25    


 


ESR  33 mm/hr (0-20)  H  17  06:25    


 


Sodium  131 mEq/L (136-145)  L  17  06:25    


 


Potassium  3.6 mEq/L (3.5-5.1)   17  06:25    


 


Chloride  100 mEq/L ()   17  06:25    


 


Carbon Dioxide  25.7 mEq/L (21.0-31.0)   17  06:25    


 


Anion Gap  8.9  (7.0-16.0)   17  06:25    


 


BUN  15 mg/dL (7-25)   17  06:25    


 


Creatinine  0.6 mg/dL (0.7-1.3)  L  17  06:25    


 


Est GFR ( Amer)  > 60.0 ml/min (>90)   17  06:25    


 


Est GFR (Non-Af Amer)  > 60.0 ml/min  17  06:25    


 


BUN/Creatinine Ratio  25.0   17  06:25    


 


Glucose  261 mg/dL ()  H  17  06:25    


 


POC Glucose  321 MG/DL (70 - 105)  H  17  16:11    


 


Hemoglobin A1c %  11.3 % (4.0-6.0)  H  17  06:30    


 


Calcium  9.0 mg/dL (8.6-10.3)   17  06:25    


 


Total Bilirubin  0.3 mg/dL (0.3-1.0)   17  06:25    


 


Direct Bilirubin  0.07 mg/dL (0.0-0.2)   17  06:25    


 


AST  27 U/L (13-39)   17  06:25    


 


ALT  36 U/L (7-52)   17  06:25    


 


Alkaline Phosphatase  62 U/L ()   17  06:25    


 


C-Reactive Protein  0.7 mg/dL (0.0-0.9)   17  06:25    


 


Total Protein  6.5 gm/dL (6.0-8.3)   17  06:25    


 


Albumin  4.0 gm/dL (4.2-5.5)  L  17  06:25    


 


Globulin  2.5 gm/dL  17  06:25    


 


Albumin/Globulin Ratio  1.6  (1.0-1.8)   17  06:25    


 


Triglycerides  696 mg/dL (<150)  H  17  06:25    


 


Cholesterol  247 mg/dL (<200)  H  17  06:25    


 


LDL Cholesterol Direct  137 mg/dL ()   17  06:25    


 


HDL Cholesterol  24 mg/dL (23-92)   17  06:25    


 


Lipase  70 U/L (11-82)   17  06:30    


 


TSH  2.23 uIU/ml (0.34-5.60)   17  06:25    


 


Urine Source  RANDOM   17  01:54    


 


Urine Color  YELLOW   17  01:54    


 


Urine Clarity  CLEAR  (CLEAR)   17  01:54    


 


Urine pH  6.0   17  01:54    


 


Ur Specific Gravity  1.010  (1.005-1.030)   17  01:54    


 


Urine Protein  NEGATIVE mg/dL (NEGATIVE)   17  01:54    


 


Urine Glucose (UA)  500 mg/dL (NEGATIVE)  H  17  01:54    


 


Urine Ketones  NEGATIVE mg/dL (NEGATIVE)   17  01:54    


 


Urine Blood  NEGATIVE  (NEGATIVE)   17  01:54    


 


Urine Nitrate  NEGATIVE  (NEGATIVE)   17  01:54    


 


Urine Bilirubin  NEGATIVE  (NEGATIVE)   17  01:54    


 


Urine Urobilinogen  0.2 E.U./dL (0.2 - 1.0)   17  01:54    


 


Ur Leukocyte Esterase  NEGATIVE  (NEGATIVE)   17  01:54    


 


Urine RBC  NONE SEEN /hpf (0-5)   17  01:54    


 


Urine WBC  NONE SEEN /hpf (0-5)   17  01:54    


 


Ur Epithelial Cells  NONE SEEN /lpf (FEW)   17  01:54    


 


Urine Bacteria  NONE SEEN /hpf (NONE SEEN)   17  01:54    














- Physical Exam


Vitals and I&O: 


 Vital Signs











Temp  98.0 F   17 16:00


 


Pulse  75   17 16:00


 


Resp  20   17 16:00


 


BP  156/83   17 16:00


 


Pulse Ox  97   17 16:00








 Intake & Output











 17





 18:59 06:59 18:59


 


Intake Total 600 500 


 


Output Total 1000  


 


Balance -400 500 


 


Intake:   


 


  Oral 600 500 


 


Output:   


 


  Urine 1000  


 


Other:   


 


  # Voids  2 


 


  Stool Characteristics Soft  Soft











Active Medications: 


 Current Medications





Acetaminophen (Tylenol)  650 mg PO Q4H PRN


   PRN Reason: pain or fever


   Stop: 17 15:09


Acetaminophen/Hydrocodone Bitart (Norco 10 Mg/325 Mg)  1 tab PO Q4H PRN


   PRN Reason: abdominal pain scale of 5-8


   Stop: 17 11:26


   Last Admin: 17 13:39 Dose:  1 tab


Atorvastatin Calcium (Lipitor)  20 mg PO DAILY [facility]


   PRN Reason: Protocol


   Stop: 17 08:59


   Last Admin: 17 08:48 Dose:  20 mg


Clopidogrel Bisulfate (Plavix)  75 mg PO DAILY [facility]


   Stop: 17 08:59


   Last Admin: 17 08:48 Dose:  75 mg


Famotidine (Pepcid)  20 mg PO BID [facility]


   Stop: 17 16:59


   Last Admin: 17 16:18 Dose:  20 mg


Furosemide (Lasix)  40 mg PO DAILY [facility]


   Stop: 17 08:59


   Last Admin: 17 08:47 Dose:  40 mg


Gabapentin (Neurontin)  600 mg PO BID [facility]


   Stop: 17 16:59


   Last Admin: 17 16:18 Dose:  600 mg


Gemfibrozil (Lopid)  600 mg PO BIDAC [facility]


   Stop: 17 16:29


   Last Admin: 17 16:18 Dose:  600 mg


Hydralazine HCl (Apresoline)  25 mg PO TID [facility]


   Stop: 17 15:59


   Last Admin: 17 13:40 Dose:  25 mg


Insulin Aspart (Novolog Insulin Sliding Scale)  0 units SUBQ ACHS [facility]


   PRN Reason: Protocol


   Stop: 17 11:29


   Last Admin: 17 16:27 Dose:  9 units


Losartan Potassium (Cozaar)  100 mg PO DAILY [facility]


   Stop: 17 16:59


   Last Admin: 17 08:48 Dose:  100 mg


Magnesium Hydroxide (Milk Of Magnesia)  30 ml PO HS PRN


   PRN Reason: Constipation


   Stop: 17 15:15


Metformin HCl (Glucophage)  850 mg PO BIDWM [facility]


   Stop: 17 17:59


   Last Admin: 17 17:00 Dose:  850 mg


Methocarbamol (Robaxin)  500 mg PO TID PRN


   PRN Reason: MUSCLE SPASM


   Stop: 17 15:08


Pantoprazole Sodium (Protonix)  40 mg PO QDAC [facility]


   Stop: 17 07:29


   Last Admin: 17 06:35 Dose:  Not Given


Zolpidem Tartrate (Ambien)  10 mg PO HS PRN


   PRN Reason: Insomnia


   Stop: 17 20:51


   Last Admin: 17 20:24 Dose:  10 mg








General: no acute distress, well developed, well nourished


HEENT: atraumatic, normocephalic, PERRLA, EOMI, moist mucous membrane


Neck: supple, no thyromegaly, no lymphadenopathy


Cardiovascular: S1S2, no regular


Lungs: clear to auscultation bilaterally, clear to percussion


Abdomen: soft, tender, bowel sounds, no distended, no mass, no guarding


Extremities: no cyanosis, no clubbing, no edema


Neurological: awake, alert, oriented, CN 2-12 intact


Skin: intact





Infectious Disease Assmt/Plan





- Assessment


Assessment: 





Impression:


1. epigastric abdominal pain, r/o PUD.


2. DM2


3. HTN.


4. Obesity.





- Plan


Plan: 





Continue same treatment. F/u as consults.





Nutritional Asmnt/Malnutr-PDOC





- Dietary Evaluation


Malnutrition Findings (Please click <Entered> for more info): 








Nutritional Asmnt/Malnutrition                             Start:  17 09:

29


Text:                                                      Status: Active      

  


Freq:                                                                          

  


 Document     17 13:24  [facility]  [facility] 17 13[facility]45  [facility]

[facility]


 Nutritional Asmnt/Malnutrition


     Patient General Information


      Nutritional Screening                      Consult


      Diagnosis                                  Enteritis, Uncontrolled DM


      Pertinent Medical Hx/Surgical Hx           HTN, DM, CVA


      Subjective Information                     Received MD consult on -


                                                 for HgbA1c 11.3%. Spoke with


                                                 pt who reports a large


                                                 appetite, denies nausea,


                                                 abdominal pain, food allergies


                                                 . RD obtained food preferences


                                                 and added to chart. Pt would


                                                 like more food. RD added


                                                 double protein and non-starchy


                                                 vegetables. Pt consumed 100%


                                                 x 2meals so far, which is


                                                 adequate to meet pt estimated


                                                 nutrition needs. Pt BMI 39.


                                                 74kg/m2 (obese class III).


                                                 Last BM noted .


      Current Diet Order/ Nutrition Support      2Gm Na, CCHO


      Patient / S.O                              Can


      Pertinent Medications                      Lasix, Pepcid, Plavix,


                                                 Metformin, Cozaar, Protonix,


                                                 Lopid, s/p Novolog SSI


      Pertinent Labs                             () Na 131, glucose 261,


                                                 POC glucose (past 24hours) 266


                                                 -293, Trig 696, cholesterol


                                                 247, HgbA1c 11.3%


     Nutritional Hx/Data


      Height                                     1.8 m


      Height (Calculated Centimeters)            180.3


      Current Weight (lbs)                       129.274 kg


      Weight (Calculated Kilograms)              129.3


      Weight (Calculated Grams)                  872846.8


      Ideal Body Weight                          78


      % Ideal Body Weight                        165


      Weight Status                              Obese


     GI Symptoms


      GI Symptoms                                Diarrhea


      Food Allergies                             No


      Cultural/Ethnic/Latter day Belief           none noted.


      Usual diet at home                         Regular


      Skin Integrity/Comment:                    intact, ousmane 21


      Current %PO                                Good (%)


     Estimated Nutritional Goals


      Calories/Kcals/Kg                          25-30kcal/kg IBW


      Kcals Calculated                           1950 - 2340kcal/day


      Protein:                                   Adj wt of IBW


      Protein g/k.8-1gm/kg


      Protein Calculated                         62-78gm/day


      Fluid: ml                                  1.95-2.3L/day (1ml/kcal) or


                                                 per MD/DO


     Nutritional Problem


      1. Problem


       Problem                                   Altered nutrition related labs


                                                 related to


       Etiology                                  endocrine dysfunction as


                                                 evidenced by


       Signs/Symptoms:                           elevated HgbA1c and glucose


     Intervention/Recommendation


      Recommendations by RD                      Dietary Education by RD1


      Comments                                   1. Continue current diet as


                                                 tolerated.


                                                 2. RD added double protein and


                                                 non-starchy vegetables to


                                                 meals.


                                                 3. Pt declined diet education,


                                                 provided handouts. Please see


                                                 Pt teaching record for


                                                 details.


     Expected Outcomes/Goals


      Expected Outcomes/Goals                    Goals: Tolerate diet to meet >


                                                 75% estimated nutrition needs.


                                                 Glucose >74 - <180.


                                                 Monitor: PO intakes, nutrition


                                                 related labs, GI.


                                                 F/U as Low Risk in 5-7 days (-).

## 2017-02-20 LAB
ANION GAP SERPL CALC-SCNC: 7.4 MMOL/L (ref 7–16)
BASOPHILS NFR BLD AUTO: 0.6 % (ref 0–2)
BUN SERPL-MCNC: 23 MG/DL (ref 7–25)
BUN/CREAT SERPL: 32.9
CALCIUM SERPL-MCNC: 9.1 MG/DL (ref 8.6–10.3)
CHLORIDE SERPL-SCNC: 103 MEQ/L (ref 98–107)
CO2 SERPL-SCNC: 25.4 MEQ/L (ref 21–31)
CREAT SERPL-MCNC: 0.7 MG/DL (ref 0.7–1.3)
EOSINOPHIL NFR BLD AUTO: 2.5 % (ref 0–5)
ERYTHROCYTE [DISTWIDTH] IN BLOOD BY AUTOMATED COUNT: 12.2 % (ref 11.5–20)
GLUCOSE SERPL-MCNC: 291 MG/DL (ref 70–105)
HCT VFR BLD CALC: 40.6 % (ref 39–49)
HGB BLD-MCNC: 14.1 GM/DL (ref 13.2–17.3)
INR PPP: 1 (ref 0.5–1.4)
LYMPHOCYTES NFR BLD AUTO: 25.3 % (ref 20–50)
MCH RBC QN AUTO: 29.8 PG (ref 26–30)
MCHC RBC AUTO-ENTMCNC: 34.7 PG (ref 28–36)
MCV RBC AUTO: 85.8 FL (ref 80–99)
MONOCYTES NFR BLD AUTO: 10.4 % (ref 2–10)
NEUTROPHILS # BLD: 5.1 TH/CMM (ref 1.8–8)
NEUTROPHILS NFR BLD AUTO: 61.2 % (ref 40–80)
PLATELET # BLD: 260 TH/CMM (ref 150–400)
PMV BLD AUTO: 8.5 FL
POTASSIUM SERPL-SCNC: 3.8 MEQ/L (ref 3.5–5.1)
PROTHROMBIN TIME: 9.9 SECONDS (ref 9.5–11.5)
RBC # BLD AUTO: 4.73 MIL/CMM (ref 4.3–5.7)
SODIUM SERPL-SCNC: 132 MEQ/L (ref 136–145)
WBC # BLD AUTO: 8.3 TH/CMM (ref 4.8–10.8)

## 2017-02-20 PROCEDURE — 0DB98ZX EXCISION OF DUODENUM, VIA NATURAL OR ARTIFICIAL OPENING ENDOSCOPIC, DIAGNOSTIC: ICD-10-PCS

## 2017-02-20 PROCEDURE — 0DB68ZX EXCISION OF STOMACH, VIA NATURAL OR ARTIFICIAL OPENING ENDOSCOPIC, DIAGNOSTIC: ICD-10-PCS

## 2017-02-20 RX ADMIN — HYDROCODONE BITATRATE AND ACETAMINOPHEN PRN TAB: 10; 325 TABLET ORAL at 08:38

## 2017-02-20 RX ADMIN — INSULIN ASPART SCH: 100 INJECTION, SOLUTION INTRAVENOUS; SUBCUTANEOUS at 06:35

## 2017-02-20 RX ADMIN — INSULIN ASPART SCH UNITS: 100 INJECTION, SOLUTION INTRAVENOUS; SUBCUTANEOUS at 13:48

## 2017-02-20 RX ADMIN — PANTOPRAZOLE SODIUM SCH: 40 TABLET, DELAYED RELEASE ORAL at 06:35

## 2017-02-20 RX ADMIN — HYDROCODONE BITATRATE AND ACETAMINOPHEN PRN TAB: 10; 325 TABLET ORAL at 02:24

## 2017-02-20 RX ADMIN — INSULIN ASPART SCH UNITS: 100 INJECTION, SOLUTION INTRAVENOUS; SUBCUTANEOUS at 17:17

## 2017-02-20 RX ADMIN — HYDROCODONE BITATRATE AND ACETAMINOPHEN PRN TAB: 10; 325 TABLET ORAL at 16:50

## 2017-02-20 RX ADMIN — INSULIN ASPART SCH: 100 INJECTION, SOLUTION INTRAVENOUS; SUBCUTANEOUS at 11:45

## 2017-02-20 NOTE — DIAGNOSTIC IMAGING REPORT
Small bowel follow-through



HISTORY: Pain, enteritis



Water-soluble contrast was administered orally.



There is free flow contrast from the stomach into the small bowel. There

is an overall normal small bowel caliber and mucosal fold pattern. No

definite focal abnormalities. Normal transit time to the colon. No

evidence of any extrinsic masses.



IMPRESSION: Negative examination

## 2017-02-20 NOTE — DIAGNOSTIC IMAGING REPORT
Left shoulder (2 views)



HISTORY: Pain



Exam demonstrates degenerative changes with sclerosis along the lateral

margin of the humeral head and greater tuberosity. Mild hypertrophic

changes about the acromioclavicular joint. No acute abnormalities. No

fractures. No dislocation. No abnormal calcifications.



IMPRESSION:

1. Mild degenerative changes

## 2017-02-20 NOTE — DIAGNOSTIC IMAGING REPORT
Abdominal ultrasound



HISTORY: Pain



The exam is very limited due to patient's size, body habitus, bowel gas.



Limited views of the liver demonstrates generalized enlargement. No

obvious focal lesions of the gallbladder could not be visualized. As the

patient was not fasting, the finding isn't conclusive. No definite

biliary dilatation. The pancreas cannot be seen. No definite focal renal

lesions or hydronephrosis. The spleen appears enlarged (14.4 cm). No

other obvious retroperitoneal or intra-abdominal abnormalities.



IMPRESSION:

1. Very limited exam due to patient's size, body habitus, bowel gas

2. Nonvisualization of the gallbladder. This finding is inconclusive as

the patient was not fasting. If gallbladder disease is suspected, a

repeat exam with the patient fasting recommended.

3. Hepatosplenomegaly

## 2017-02-20 NOTE — DIAGNOSTIC IMAGING REPORT
Portable chest x-ray



HISTORY: Shortness of breath



The overall heart size is difficult to assess with portable technique

and a poor inspiration. No acute focal pulmonary processes. No hilar or

mediastinal abnormalities.



IMPRESSION: No acute pulmonary processes

## 2017-02-20 NOTE — CONSULTATION
The patient of Dr. Varela.



HISTORY AND PHYSICAL:  This 51-year-old male patient who has a known history of

diabetes, hypertension, CVA with left-sided weakness, complaining of abdominal

pain, no nausea or vomiting.  Following this, the patient is admitted.



PAST MEDICAL HISTORY:  Diabetes, hypertension, CVA with left-sided weakness.



FAMILY HISTORY:  Diabetes, hypertension.



SOCIAL HISTORY:  No history of smoking, alcohol abuse.



ALLERGIES:  No known allergies.



PHYSICAL EXAMINATION:

VITAL SIGNS:  Blood pressure 130/80, pulse 70, respirations 20.

HEAD:  Normocephalic.  No lumps or bumps.

EYES:  Pupils equal, reactive to light.  Fundi show AV nicking, sclerae white,

conjunctivae pink.

NECK:  Carotid 2+.  Normal upstroke.  JVD flat.  Thyroid not palpable.  Lymph

nodes not palpable.

CHEST:  Shows increased AP diameter.  No kyphosis or scoliosis.

LUNGS:  Bilateral bronchovesicular breath sounds.

HEART:  PMI fifth intercostal space with lateral to midclavicular line.  S1, S2.

 No S3, S4.  Systolic murmur, grade 2/6, lower left sternal border without

radiation.

ABDOMEN:  Soft.  Liver, spleen not palpable.  There is tenderness in epigastric

area.  No rebound tenderness.  Bowel sounds active.

RECTAL:  Hemoccult negative.

EXTREMITIES:  Peripheral pulses 2+.  No pedal edema.

NEUROLOGICAL:  The patient has weakness in the left upper and lower extremity.



CLINICAL IMPRESSION:

1. Abdominal pain, etiology unknown.

2. Hypertension, diabetes, hyperlipidemia, left cerebrovascular accident with

late effect, left-sided weakness.  The patient also has laparoscopy surgery for

diaphragmatic hernia, repair of the spleen and colon, mandibular fracture and

appendectomy.



PLAN:  We will monitor the patient closely.  The patient to have a GI consult.





DD: 02/19/2017 16:05

DT: 02/20/2017 05:50

JOB# [id number]  [id number]

## 2017-02-20 NOTE — PROGRESS NOTES
SUBJECTIVE:  The patient seen in his room.  He is still complaining of left

upper quadrant abdominal pain, for about a 5/10.  The patient has been n.p.o.

since midnight.



OBJECTIVE:

HEENT:  Head is atraumatic, normocephalic.  Eyes:  Pupils are equally round and

reactive, bilateral conjunctivae are clear.

NECK:  Supple.  No JVD.

CARDIOVASCULAR:  S1 and S2.  No murmur.

PULMONARY:  Decreased breath sounds bilaterally with minimal inspiratory

wheezing.

GASTROINTESTINAL:  Soft and nontender.  No guarding.

MUSCULOSKELETAL:  The patient has no weakness, but patient uses a walker for

ambulation.



ASSESSMENT:

1. Abdominal pain, rule out peptic ulcer disease.

2. Hypertension.

3. Diabetes.

4. Obesity.

5. History of cerebrovascular accident.



PLAN:  The patient is scheduled to have an EGD for today and we will follow up

with the GI doctor and keep the patient in the Med/Surg floor.





DD: 02/20/2017 10:49

DT: 02/20/2017 23:21

JOB# [id number]  [id number]

## 2017-02-20 NOTE — OPERATIVE REPORT
PROCEDURE PERFORMED:  Esophagogastroduodenoscopy with biopsy.



INDICATION FOR PROCEDURE:  Abdominal pain, nausea, vomiting.



CONSENT:  Informed consent was obtained from the patient after outlining

benefits and risks including infection, bleeding, perforation, death.



ANESTHESIA USED:  Propofol given by anesthesiologist.



PREOPERATIVE DIAGNOSES:

1.  Abdominal pain.

2.  Nausea, vomiting.



POSTOPERATIVE DIAGNOSIS:  Gastritis.



DESCRIPTION OF PROCEDURE:  The patient was placed in left lateral position. 

Upper Olympus endoscope was introduced into the mouth and advanced to the

esophagus, which was intubated under direct visualization.  Esophageal mucosa

was examined on the way down.  It was essentially normal.  GE junction was

identified at 40 cm.  Scope was advanced to the stomach where the gastric mucosa

was examined and it showed gastritis.  There is erythema involving the body and

antrum and the mucosa was a little bit nodular in the prepyloric area.  Scope 
was

advanced through the pylorus to the duodenum where the bulb and second parts

were examined.  They were both normal.  Scope was withdrawn to the stomach,

retroflexed to examine the cardia and fundus.  It did not show any other

abnormality.  Scope was then straightened and advanced to the duodenum. 

Biopsies were taken from the third portion of the duodenum and then from the

bulb to rule out celiac disease.  Scope was then withdrawn to the stomach,

retroflexed to examine the cardia and fundus and again no other abnormality was

noted.  So, scope was then straightened.  Biopsies were taken from the body and

antrum for histopathology and then from the antrum for CLOtest.  Same

examination was repeated again without any findings.  So, scope was then

withdrawn while examining the gastric and esophageal mucosa second time.  The

patient tolerated the procedure well.  There were no immediate postoperative

complications.



RECOMMENDATIONS:

1.  Follow up biopsy results.

2.  Treat H. pylori if positive.

3.  Modify diet if the patient has celiac disease.

4.  PPI.



Thank you, Dr. Varela for allowing me to participate in the care of this patient.

 If you have any further questions, please let me know.





DD: 02/20/2017 13:28

DT: 02/20/2017 16:37

JOB# [id number]  [id number]

[email or web address]

## 2017-02-21 LAB
FOLATE SERPL-MCNC: 14.2 NG/ML (ref 3–?)
PREALB SERPL-MCNC: 28 MG/DL (ref 10–36)
VIT B12 SERPL-MCNC: 330 PG/ML (ref 211–946)

## 2017-02-21 NOTE — PATHOLOGY REPORT
Collection date:  02/20/2017



Surgeon:  Dr. MICK Day



Specimen Description:  

1.  Duodenal biopsy.

2.  Antrum biopsy.



Gross Description: Part 1:  Received in formalin are three tan soft tissue

fragments ranging from 0.1 to 0.2 cm in greatest dimension.  Totally submitted

in one cassette labeled A. 

Gross Description:  Part 2:  Received in formalin are two tan soft tissue

fragments ranging from 0.1 to 0.2 cm in greatest dimension.  Totally submitted

in one cassette labeled B. 



Microscopic Description: Part 1:   The histologic sections show benign duodenal

mucosa with intact intestinal villi, with no structural villous abnormalities.

  

Diagnosis:   Part 1:

No evidence for celiac disease / sprue (duodenal biopsy).



Microscopic Description:  Part 2:   The histologic sections show gastric mucosa

with mild chronic inflammation present consisting of lymphocytes and plasma

cells.  The Giemsa stain shows no evidence for Helicobacter Pylori.



Diagnosis:  Part 2:

1.  Mild chronic gastritis, antrum biopsy.

2.  The Giemsa stain is negative for Helicobacter Pylori.





DD: 02/21/2017 10:22

DT: 02/21/2017 11:13

[facility]# [id number]  [id number]

[facility]

## 2017-02-27 NOTE — DISCHARGE SUMMARY
HOSPITAL COURSE:  The patient was admitted from a nursing home through the

Emergency Room with a chief complaint of left upper quadrant abdominal pain with

nausea, but denies any vomiting for about 3-5 days.  The patient was admitted

through the Med/Surg Floor with a diagnosis of gastritis versus diverticulosis. 

The patient underwent EGD with biopsy and based on the procedure, postoperative

diagnosis is gastritis and the patient's biopsy came out negative.  The patient

was discharged back to [facility] and discussed the condition with the

patient.



DISCHARGE DIAGNOSES:

1.  Gastritis.

2.  Hypertension.

3.  Diabetes.

4.  Obesity.

5.  History of cerebrovascular accident.





DD: 02/27/2017 09:33

DT: 02/27/2017 18:00

JOB# [id number]  [id number]

## 2018-02-06 ENCOUNTER — HOSPITAL ENCOUNTER (INPATIENT)
Dept: HOSPITAL 36 - ER | Age: 53
LOS: 10 days | Discharge: NURSING, REHAB OR LONG-TERM CARE | DRG: 982 | End: 2018-02-16
Attending: FAMILY MEDICINE | Admitting: FAMILY MEDICINE
Payer: MEDICARE

## 2018-02-06 DIAGNOSIS — S72.142A: ICD-10-CM

## 2018-02-06 DIAGNOSIS — Z66: ICD-10-CM

## 2018-02-06 DIAGNOSIS — J20.9: ICD-10-CM

## 2018-02-06 DIAGNOSIS — I11.9: ICD-10-CM
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
## 2018-02-06 DIAGNOSIS — R07.9: ICD-10-CM

## 2018-02-06 DIAGNOSIS — I69.354: ICD-10-CM

## 2018-02-06 DIAGNOSIS — Z90.49: ICD-10-CM

## 2018-02-06 DIAGNOSIS — G47.33: ICD-10-CM

## 2018-02-06 DIAGNOSIS — I25.118: ICD-10-CM

## 2018-02-06 DIAGNOSIS — K21.9: ICD-10-CM

## 2018-02-06 DIAGNOSIS — I25.119: ICD-10-CM

## 2018-02-06 DIAGNOSIS — I34.0: ICD-10-CM
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
## 2018-02-06 DIAGNOSIS — E66.01: ICD-10-CM

## 2018-02-06 DIAGNOSIS — Z79.82: ICD-10-CM

## 2018-02-06 DIAGNOSIS — J44.0: ICD-10-CM

## 2018-02-06 DIAGNOSIS — E11.9: ICD-10-CM

## 2018-02-06 DIAGNOSIS — Z79.4: ICD-10-CM

## 2018-02-06 DIAGNOSIS — J44.1: ICD-10-CM
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
## 2018-02-06 DIAGNOSIS — J18.9: Primary | ICD-10-CM

## 2018-02-06 LAB
ALBUMIN SERPL-MCNC: 4.7 GM/DL (ref 4.2–5.5)
ALBUMIN/GLOB SERPL: 1.7 {RATIO} (ref 1–1.8)
ALP SERPL-CCNC: 82 U/L (ref 34–104)
ALT SERPL-CCNC: 48 U/L (ref 7–52)
ANION GAP SERPL CALC-SCNC: 16.3 MMOL/L (ref 7–16)
AST SERPL-CCNC: 31 U/L (ref 13–39)
BASOPHILS # BLD AUTO: 0.1 TH/CUMM (ref 0–0.2)
BASOPHILS NFR BLD AUTO: 0.6 % (ref 0–2)
BILIRUB SERPL-MCNC: 0.3 MG/DL (ref 0.3–1)
BUN SERPL-MCNC: 14 MG/DL (ref 7–25)
CALCIUM SERPL-MCNC: 9.1 MG/DL (ref 8.6–10.3)
CHLORIDE SERPL-SCNC: 100 MEQ/L (ref 98–107)
CO2 SERPL-SCNC: 23.5 MEQ/L (ref 21–31)
CREAT SERPL-MCNC: 0.8 MG/DL (ref 0.7–1.3)
EOSINOPHIL # BLD AUTO: 0.2 TH/CMM (ref 0.1–0.4)
EOSINOPHIL NFR BLD AUTO: 1.9 % (ref 0–5)
ERYTHROCYTE [DISTWIDTH] IN BLOOD BY AUTOMATED COUNT: 13 % (ref 11.5–20)
GLOBULIN SER-MCNC: 2.7 GM/DL
GLUCOSE SERPL-MCNC: 278 MG/DL (ref 70–105)
HBA1C MFR BLD: 10 % (ref 4–6)
HCT VFR BLD CALC: 42.4 % (ref 41–60)
HGB BLD-MCNC: 14.5 GM/DL (ref 12–16)
HGB BLD-MCNC: 15 G/DL (ref 4–35)
LYMPHOCYTE AB SER FC-ACNC: 2.5 TH/CMM (ref 1.5–3)
LYMPHOCYTES NFR BLD AUTO: 28.3 % (ref 20–50)
MCH RBC QN AUTO: 29.4 PG (ref 26–30)
MCHC RBC AUTO-ENTMCNC: 34.2 PG (ref 28–36)
MCV RBC AUTO: 85.9 FL (ref 80–99)
MONOCYTES # BLD AUTO: 0.8 TH/CMM (ref 0.3–1)
MONOCYTES NFR BLD AUTO: 9.5 % (ref 2–10)
NEUTROPHILS # BLD: 5.1 TH/CMM (ref 1.8–8)
NEUTROPHILS NFR BLD AUTO: 59.7 % (ref 40–80)
PLATELET # BLD: 367 TH/CMM (ref 150–400)
PMV BLD AUTO: 7.5 FL
POTASSIUM SERPL-SCNC: 3.8 MEQ/L (ref 3.5–5.1)
RBC # BLD AUTO: 4.93 MIL/CMM (ref 4.3–5.7)
SODIUM SERPL-SCNC: 136 MEQ/L (ref 136–145)
WBC # BLD AUTO: 8.7 TH/CMM (ref 4.8–10.8)

## 2018-02-06 PROCEDURE — V2790 AMNIOTIC MEMBRANE: HCPCS

## 2018-02-06 PROCEDURE — X3904: HCPCS

## 2018-02-06 PROCEDURE — X5716: HCPCS

## 2018-02-06 PROCEDURE — X3900: HCPCS

## 2018-02-06 PROCEDURE — X6026: HCPCS

## 2018-02-06 PROCEDURE — X6158: HCPCS

## 2018-02-06 PROCEDURE — Z7610: HCPCS

## 2018-02-06 PROCEDURE — A4217 STERILE WATER/SALINE, 500 ML: HCPCS

## 2018-02-06 PROCEDURE — C9290 INJ, BUPIVACAINE LIPOSOME: HCPCS

## 2018-02-06 PROCEDURE — X6776: HCPCS

## 2018-02-06 PROCEDURE — X6024: HCPCS

## 2018-02-06 NOTE — ED PHYSICIAN CHART
ED Chief Complaint/HPI





- Patient Information


Date Seen:: 02/06/18


Time Seen:: 20:06


Chief Complaint:: Cough


History of Present Illness:: 


51 yo male had cough productive yellow sputum for 2 weeks. Cough worsen lying 

down. He had sore throat and SOB but denied fever. 


Allergies:: 


 Allergies











Allergy/AdvReac Type Severity Reaction Status Date / Time


 


No Known Allergies Allergy   Verified 02/06/18 19:48











Vitals:: 


 Vital Signs - 8 hr











  02/06/18





  19:40


 


Temp 98.1 F


 


HR 91


 


RR 20


 


/98


 


O2 Sat % 97














ED Past Medical History





- Past Medical History


Past Medical History: HTN, DM, CAD, Asthma/COPD, CVA/TIA (intracranial 

hemorrhage with left side weakness), PUD/GERD


Social History: Non Smoker, No Alcohol, No Drug Use


Surgical History: Appendectomy, other (laparotomy, right tibial fracture)





Family Medical History





- Family Member


  ** Mother


History Unknown: Yes





ED Physical Exam





- Physical Examination


Other Respiratory comments:: 


Rhonchi and rales when coughing





ED Septic Shock





- <6hrs of presentation:


Vital Signs: 


 Vital Signs - 8 hr











  02/06/18





  19:40


 


Temp 98.1 F


 


HR 91


 


RR 20


 


/98


 


O2 Sat % 97

## 2018-02-07 VITALS — SYSTOLIC BLOOD PRESSURE: 145 MMHG | DIASTOLIC BLOOD PRESSURE: 83 MMHG

## 2018-02-07 LAB
ANION GAP SERPL CALC-SCNC: 13.1 MMOL/L (ref 7–16)
BASOPHILS # BLD AUTO: 0 TH/CUMM (ref 0–0.2)
BASOPHILS NFR BLD AUTO: 0.2 % (ref 0–2)
BUN SERPL-MCNC: 16 MG/DL (ref 7–25)
CALCIUM SERPL-MCNC: 8.4 MG/DL (ref 8.6–10.3)
CHLORIDE SERPL-SCNC: 102 MEQ/L (ref 98–107)
CO2 SERPL-SCNC: 24.4 MEQ/L (ref 21–31)
CREAT SERPL-MCNC: 0.6 MG/DL (ref 0.7–1.3)
EOSINOPHIL # BLD AUTO: 0.1 TH/CMM (ref 0.1–0.4)
EOSINOPHIL NFR BLD AUTO: 2 % (ref 0–5)
ERYTHROCYTE [DISTWIDTH] IN BLOOD BY AUTOMATED COUNT: 12.9 % (ref 11.5–20)
GLUCOSE SERPL-MCNC: 290 MG/DL (ref 70–105)
HCT VFR BLD CALC: 37 % (ref 41–60)
HGB BLD-MCNC: 12.9 GM/DL (ref 12–16)
LYMPHOCYTE AB SER FC-ACNC: 1.8 TH/CMM (ref 1.5–3)
LYMPHOCYTES NFR BLD AUTO: 24 % (ref 20–50)
MCH RBC QN AUTO: 29.8 PG (ref 26–30)
MCHC RBC AUTO-ENTMCNC: 34.9 PG (ref 28–36)
MCV RBC AUTO: 85.3 FL (ref 80–99)
MONOCYTES # BLD AUTO: 0.7 TH/CMM (ref 0.3–1)
MONOCYTES NFR BLD AUTO: 10 % (ref 2–10)
NEUTROPHILS # BLD: 4.8 TH/CMM (ref 1.8–8)
NEUTROPHILS NFR BLD AUTO: 63.8 % (ref 40–80)
PCO2 BLDA: 42 MMHG (ref 35–45)
PLATELET # BLD: 275 TH/CMM (ref 150–400)
PMV BLD AUTO: 7.5 FL
PO2 BLDA: 68 MMHG (ref 80–100)
POTASSIUM SERPL-SCNC: 3.5 MEQ/L (ref 3.5–5.1)
RBC # BLD AUTO: 4.34 MIL/CMM (ref 4.3–5.7)
SAO2 % BLDA: 93 % (ref 92–100)
SODIUM SERPL-SCNC: 136 MEQ/L (ref 136–145)
WBC # BLD AUTO: 7.4 TH/CMM (ref 4.8–10.8)

## 2018-02-07 RX ADMIN — IPRATROPIUM BROMIDE SCH: 0.5 SOLUTION RESPIRATORY (INHALATION) at 01:10

## 2018-02-07 RX ADMIN — INSULIN ASPART SCH UNITS: 100 INJECTION, SOLUTION INTRAVENOUS; SUBCUTANEOUS at 13:04

## 2018-02-07 RX ADMIN — INSULIN ASPART SCH UNITS: 100 INJECTION, SOLUTION INTRAVENOUS; SUBCUTANEOUS at 06:59

## 2018-02-07 RX ADMIN — HUMAN INSULIN SCH: 100 INJECTION, SOLUTION SUBCUTANEOUS at 22:56

## 2018-02-07 RX ADMIN — ALBUTEROL SULFATE SCH MG: 2.5 SOLUTION RESPIRATORY (INHALATION) at 19:43

## 2018-02-07 RX ADMIN — ALBUTEROL SULFATE SCH MG: 2.5 SOLUTION RESPIRATORY (INHALATION) at 23:13

## 2018-02-07 RX ADMIN — ALBUTEROL SULFATE SCH: 2.5 SOLUTION RESPIRATORY (INHALATION) at 04:10

## 2018-02-07 RX ADMIN — INSULIN ASPART SCH UNITS: 100 INJECTION, SOLUTION INTRAVENOUS; SUBCUTANEOUS at 23:52

## 2018-02-07 RX ADMIN — IPRATROPIUM BROMIDE SCH MG: 0.5 SOLUTION RESPIRATORY (INHALATION) at 06:46

## 2018-02-07 RX ADMIN — ALBUTEROL SULFATE SCH MG: 2.5 SOLUTION RESPIRATORY (INHALATION) at 11:31

## 2018-02-07 RX ADMIN — IPRATROPIUM BROMIDE SCH MG: 0.5 SOLUTION RESPIRATORY (INHALATION) at 13:23

## 2018-02-07 RX ADMIN — ALBUTEROL SULFATE SCH MG: 2.5 SOLUTION RESPIRATORY (INHALATION) at 06:46

## 2018-02-07 RX ADMIN — HYDROCODONE BITATRATE AND ACETAMINOPHEN PRN TAB: 10; 325 TABLET ORAL at 16:54

## 2018-02-07 RX ADMIN — INSULIN DETEMIR SCH UNITS: 100 INJECTION, SOLUTION SUBCUTANEOUS at 21:33

## 2018-02-07 RX ADMIN — ALBUTEROL SULFATE SCH MG: 2.5 SOLUTION RESPIRATORY (INHALATION) at 15:29

## 2018-02-07 RX ADMIN — INSULIN ASPART SCH UNITS: 100 INJECTION, SOLUTION INTRAVENOUS; SUBCUTANEOUS at 17:48

## 2018-02-07 RX ADMIN — IPRATROPIUM BROMIDE SCH MG: 0.5 SOLUTION RESPIRATORY (INHALATION) at 19:43

## 2018-02-07 NOTE — DIAGNOSTIC IMAGING REPORT
Portable chest x-ray

Time: 2037 hours



History: Cough



Allowing for portable technique the heart size is normal. No focal

pulmonary parenchymal processes. No hilar or mediastinal abnormalities.



Impression: No acute abnormalities.

## 2018-02-07 NOTE — CARDIOLOGY
02/07/2018



PROCEDURE:  Echocardiogram.



The patient of Dr. Varela.



M-MODE ECHOCARDIOGRAM:  Mitral valve, anterior leaflet of mitral valve shows

normal excursion, EF velocity.  Posterior leaflet of mitral valve shows normal

excursion.  Left ventricle posterior wall shows increased thickness, normal

excursion.  Interventricular septum shows increased thickness, normal excursion,

hypertrophy of the left ventricle, ejection fraction 60%.  Left atrium normal. 

Aortic root shows normal dimension, normal excursion of aortic leaflets.



CONCLUSION:  Hypertrophy of the left ventricle, ejection fraction 60%.



2D ECHO:  Long axis view showed normal sized left ventricle with hypertrophy of

the left ventricle.  Left atrium normal.  Aortic root shows normal dimension,

normal excursion of aortic leaflets.  Short axis view of mitral valve normal. 

Short axis view of aortic valve normal.  Apical four chamber view showed normal

sized left ventricle, left atrium, right ventricle, right atrium, tricuspid and

mitral valve.  Ejection fraction 60%.



CONCLUSION:  Hypertrophy of the left ventricle, ejection fraction 60%.



Doppler study shows prominent A wave consistent with poor compliance of left

ventricle with trace mitral regurgitation.





DD: 02/07/2018 15:14

DT: 02/07/2018 15:25

JOB# [id number]  [id number]

## 2018-02-07 NOTE — CONSULTATION
DATE OF CONSULTATION:  02/07/2018



The patient of Dr. Varela.



HISTORY OF PRESENT ILLNESS:  This is a 52-year-old male patient who was brought

in from [facility], complaining of cough with expectoration, fever, and chills.  The

patient complained of chest pain with coughing and cardiac consult is requested.



PAST MEDICAL HISTORY:  Intracranial hemorrhage with left hemiparesis, COPD,

diabetes mellitus type 2, hypertension, stable angina, GERD.



FAMILY HISTORY:  Unremarkable.



SOCIAL HISTORY:  No history of smoking, alcohol abuse.



ALLERGIES:  None.



PHYSICAL EXAMINATION:

VITAL SIGNS:  Blood pressure 139/80, pulse 70, and respirations 20.

HEAD:  Normocephalic.  No lumps or bumps.

EYES:  Pupils equal, reactive to light.  Fundi show AV nicking, sclerae white,

conjunctivae pink.

NECK:  Carotid 2+.  Normal upstroke.  JVD flat.  Thyroid not palpable.  Lymph

nodes not palpable.

CHEST:  Shows increased AP diameter.  No kyphosis, scoliosis.

LUNGS:  Bilateral clear breath sounds.

HEART:  PMI fifth intercostal space with lateral to midclavicular line.  S1, S2.

 No S3, S4, soft systolic murmur.

ABDOMEN:  Soft.  Liver, spleen not palpable.  No organomegaly.  Bowel sounds

active.

NEUROLOGIC:  Left hemiparesis.



CLINICAL IMPRESSION:  Chest pain, unlikely coronary artery disease, intracranial

hemorrhage with left cerebrovascular accident late effect, chronic obstructive

pulmonary disease, diabetes mellitus type 2, hypertension, stable angina, and

gastroesophageal reflux disease.



PLAN:  Admit the patient.  We will get troponin level.  The patient had

echocardiogram, which showed hypertrophy of the left ventricle with trace mitral

regurgitation.  We will also get troponin level.





DD: 02/07/2018 14:56

DT: 02/07/2018 17:11

JOB# [id number]  [id number]

## 2018-02-08 LAB
ALBUMIN SERPL-MCNC: 4 GM/DL (ref 4.2–5.5)
ALBUMIN/GLOB SERPL: 1.9 {RATIO} (ref 1–1.8)
ALP SERPL-CCNC: 55 U/L (ref 34–104)
ALT SERPL-CCNC: 36 U/L (ref 7–52)
ANION GAP SERPL CALC-SCNC: 12.6 MMOL/L (ref 7–16)
ARTERIAL PATENCY WRIST A: YES
AST SERPL-CCNC: 22 U/L (ref 13–39)
BASOPHILS # BLD AUTO: 0 TH/CUMM (ref 0–0.2)
BASOPHILS NFR BLD AUTO: 0 % (ref 0–2)
BILIRUB DIRECT SERPL-MCNC: 0.04 MG/DL (ref 0–0.2)
BILIRUB SERPL-MCNC: 0.4 MG/DL (ref 0.3–1)
BUN SERPL-MCNC: 13 MG/DL (ref 7–25)
CALCIUM SERPL-MCNC: 9.1 MG/DL (ref 8.6–10.3)
CHLORIDE SERPL-SCNC: 101 MEQ/L (ref 98–107)
CO2 SERPL-SCNC: 25.7 MEQ/L (ref 21–31)
CREAT SERPL-MCNC: 0.6 MG/DL (ref 0.7–1.3)
EOSINOPHIL # BLD AUTO: 0.1 TH/CMM (ref 0.1–0.4)
EOSINOPHIL NFR BLD AUTO: 1.6 % (ref 0–5)
ERYTHROCYTE [DISTWIDTH] IN BLOOD BY AUTOMATED COUNT: 12.9 % (ref 11.5–20)
GLOBULIN SER-MCNC: 2.1 GM/DL
GLUCOSE SERPL-MCNC: 320 MG/DL (ref 70–105)
HCT VFR BLD CALC: 36.6 % (ref 41–60)
HGB BLD-MCNC: 12.5 GM/DL (ref 12–16)
LYMPHOCYTE AB SER FC-ACNC: 1.9 TH/CMM (ref 1.5–3)
LYMPHOCYTES NFR BLD AUTO: 29.3 % (ref 20–50)
MCH RBC QN AUTO: 29.1 PG (ref 26–30)
MCHC RBC AUTO-ENTMCNC: 34.2 PG (ref 28–36)
MCV RBC AUTO: 85.2 FL (ref 80–99)
MONOCYTES # BLD AUTO: 0.7 TH/CMM (ref 0.3–1)
MONOCYTES NFR BLD AUTO: 10.6 % (ref 2–10)
NEUTROPHILS # BLD: 3.9 TH/CMM (ref 1.8–8)
NEUTROPHILS NFR BLD AUTO: 58.5 % (ref 40–80)
PCO2 BLDA: 42 MMHG (ref 35–45)
PLATELET # BLD: 301 TH/CMM (ref 150–400)
PMV BLD AUTO: 7.5 FL
PO2 BLDA: 72 MMHG (ref 80–100)
POTASSIUM SERPL-SCNC: 3.3 MEQ/L (ref 3.5–5.1)
RBC # BLD AUTO: 4.3 MIL/CMM (ref 4.3–5.7)
SAO2 % BLDA: 94 % (ref 92–100)
SODIUM SERPL-SCNC: 136 MEQ/L (ref 136–145)
WBC # BLD AUTO: 6.6 TH/CMM (ref 4.8–10.8)

## 2018-02-08 RX ADMIN — INSULIN DETEMIR SCH UNITS: 100 INJECTION, SOLUTION SUBCUTANEOUS at 20:58

## 2018-02-08 RX ADMIN — IPRATROPIUM BROMIDE SCH: 0.5 SOLUTION RESPIRATORY (INHALATION) at 01:42

## 2018-02-08 RX ADMIN — HYDROCODONE BITATRATE AND ACETAMINOPHEN PRN TAB: 10; 325 TABLET ORAL at 00:47

## 2018-02-08 RX ADMIN — INSULIN DETEMIR SCH UNITS: 100 INJECTION, SOLUTION SUBCUTANEOUS at 09:21

## 2018-02-08 RX ADMIN — BUDESONIDE SCH MG: 0.5 SUSPENSION RESPIRATORY (INHALATION) at 07:06

## 2018-02-08 RX ADMIN — INSULIN ASPART SCH UNITS: 100 INJECTION, SOLUTION INTRAVENOUS; SUBCUTANEOUS at 17:59

## 2018-02-08 RX ADMIN — ALBUTEROL SULFATE SCH MG: 2.5 SOLUTION RESPIRATORY (INHALATION) at 23:38

## 2018-02-08 RX ADMIN — HYDROCODONE BITATRATE AND ACETAMINOPHEN PRN TAB: 10; 325 TABLET ORAL at 18:06

## 2018-02-08 RX ADMIN — LACTULOSE SCH GM: 20 SOLUTION ORAL at 20:57

## 2018-02-08 RX ADMIN — ALBUTEROL SULFATE SCH MG: 2.5 SOLUTION RESPIRATORY (INHALATION) at 20:36

## 2018-02-08 RX ADMIN — HUMAN INSULIN SCH: 100 INJECTION, SOLUTION SUBCUTANEOUS at 10:55

## 2018-02-08 RX ADMIN — INSULIN ASPART SCH UNITS: 100 INJECTION, SOLUTION INTRAVENOUS; SUBCUTANEOUS at 12:23

## 2018-02-08 RX ADMIN — ALBUTEROL SULFATE SCH MG: 2.5 SOLUTION RESPIRATORY (INHALATION) at 03:10

## 2018-02-08 RX ADMIN — IPRATROPIUM BROMIDE SCH MG: 0.5 SOLUTION RESPIRATORY (INHALATION) at 13:35

## 2018-02-08 RX ADMIN — HYDROCODONE BITATRATE AND ACETAMINOPHEN PRN TAB: 10; 325 TABLET ORAL at 09:07

## 2018-02-08 RX ADMIN — ASPIRIN 81 MG SCH MG: 81 TABLET ORAL at 08:59

## 2018-02-08 RX ADMIN — PANTOPRAZOLE SODIUM SCH MG: 40 TABLET, DELAYED RELEASE ORAL at 09:02

## 2018-02-08 RX ADMIN — IPRATROPIUM BROMIDE SCH MG: 0.5 SOLUTION RESPIRATORY (INHALATION) at 20:36

## 2018-02-08 RX ADMIN — BUDESONIDE SCH MG: 0.5 SUSPENSION RESPIRATORY (INHALATION) at 20:36

## 2018-02-08 RX ADMIN — INSULIN ASPART SCH UNITS: 100 INJECTION, SOLUTION INTRAVENOUS; SUBCUTANEOUS at 06:08

## 2018-02-08 RX ADMIN — ALBUTEROL SULFATE SCH MG: 2.5 SOLUTION RESPIRATORY (INHALATION) at 11:12

## 2018-02-08 RX ADMIN — ALBUTEROL SULFATE SCH MG: 2.5 SOLUTION RESPIRATORY (INHALATION) at 07:06

## 2018-02-08 RX ADMIN — ALBUTEROL SULFATE SCH MG: 2.5 SOLUTION RESPIRATORY (INHALATION) at 14:06

## 2018-02-08 RX ADMIN — HUMAN INSULIN SCH: 100 INJECTION, SOLUTION SUBCUTANEOUS at 12:27

## 2018-02-08 RX ADMIN — IPRATROPIUM BROMIDE SCH MG: 0.5 SOLUTION RESPIRATORY (INHALATION) at 07:06

## 2018-02-08 NOTE — HISTORY AND PHYSICAL
History of Present Illness





- HPI


Chief Complaint: 





WEAKNESS, COUGH CONGESTION


HPI: 





This is a 52 year old female who is a resident of [facility] who has a 

1 day history of weakness, cough, and congestion. Patient did not have any 

fevers at the nursing home. 


Vital Signs: 





 Last Vital Signs











Temp  98.2 F   02/08/18 15:52


 


Pulse  79   02/08/18 15:52


 


Resp  18   02/08/18 15:52


 


BP  161/89   02/08/18 15:52


 


Pulse Ox  98   02/08/18 15:52














Past Medical History


Other History: 





 HTN, DM, CAD, Asthma/COPD, CVA/TIA (intracranial hemorrhage with left side 

weakness), PUD/GERD





Family Medical History





- Family Member


  ** Mother


History Unknown: Yes





Social History


Smoke: No


Alcohol: None


Drugs: None


Lives: Nursing Home





- Medications


Home Medications: 


Home Medication











 Medication  Instructions  Recorded  Type


 


Aspirin [Aspirin Chewable] 81 mg PO DAILY 01/23/17 History


 


Bisacodyl [Dulcolax 10 Mg Supp] 10 mg RC DAILY PRN 01/23/17 History


 


Clopidogrel [Plavix] 75 mg PO DAILY 01/23/17 History


 


Famotidine 20 mg PO BID 01/23/17 History


 


Furosemide [Lasix] 40 mg PO DAILY 01/23/17 History


 


Gabapentin 600 mg PO BID 01/23/17 History


 


Hydralazine HCl 50 mg PO TID 01/23/17 History


 


Insulin Glargine,Hum.rec.anlog 30 unit SQ HS 01/23/17 History





[Lantus Solostar]   


 


Insulin Human Regular [NovoLIN R*] See Protocol SUBQ ACHS 01/23/17 History


 


Lactulose 45 ml PO TID PRN 01/23/17 History


 


Magnesium Hydroxide [Milk of 30 ml PO DAILY PRN 01/23/17 History





Magnesia]   


 


metFORMIN [Glucophage] 850 mg PO BID 01/23/17 History


 


Acetaminophen [Tylenol] 650 mg PO Q4HR PRN 02/17/17 History


 


Dextrose 50% [D50w] 50 ml IVP PRN PRN 02/17/17 History


 


Fleet Enema 135 ml RC DAILY PRN 02/17/17 History


 


Gemfibrozil [Lopid] 600 mg PO BID 02/17/17 History


 


Glucagon,Human Recombinant 1 mg IJ PRN PRN 02/17/17 History





[Glucagon Emergency Kit]   


 


Hydrocodone/APAP 10 mg/325 mg 1 tab PO Q4H PRN 02/17/17 History





[Norco 10 mg/325 mg]   


 


Losartan Potassium [Cozaar] 100 mg PO DAILY 02/17/17 History


 


Menthol [Biofreeze] 4 ml TP Q4H PRN 02/17/17 History


 


Methocarbamol [Robaxin] 500 mg PO Q6H 02/17/17 History


 


Pantoprazole Sodium 40 mg PO DAILY 02/17/17 History


 


Benzocaine/Menthol [Cepacol Sore 1 tab PO Q4H PRN 12/12/17 History





Throat Lozenge]   


 


Hydrocodone/APAP 10 mg/325 mg 2 tab PO Q4H PRN 02/06/18 History





[Norco 10 mg/325 mg]   


 


Insulin Glargine,Hum.rec.anlog 15 unit SUBQ QAM 02/06/18 History





[Lantus Solostar]   


 


Tiotropium Bromide [Spiriva] 18 mcg INH DAILY 02/06/18 History


 


Zolpidem Tartrate [Ambien] 10 mg PO 1800 PRN 02/06/18 History


 


hydrOXYzine [Atarax*] 10 mg PO TID PRN 02/06/18 History














- Allergies


Allergies/Adverse Reactions: 


 Allergies











Allergy/AdvReac Type Severity Reaction Status Date / Time


 


No Known Allergies Allergy   Verified 02/06/18 19:48














Review of Systems





- Review of Systems


Constitutional: Report: Weakness


Eyes: Report: No Significant


ENT: Report: No Significant


Respiratory: Report: Cough


Cardiovascular: Report: No Significant


Gastrointestinal: Report: No Significant


Genitourinary: Report: No Significant


Neurological: Report: Weakness





Physical Exam





- Physical Exam


HEENT: Report: Ears Nose Throat within normal limits


Neck: Report: Within normal limits


Cardiovascular Systems: Report: +s1/s2 noted


Respiratory: Report: Breath Sounds are within normal limits, Rhonchi


Abdomen: Report: Non-tender to palpation


Skin: Report: Color of skin is within normal limits


Neuro/Psych: Report: Weakness or sensory loss noted.





- Lab Results


All Lab Results last 24 hours: 





 Laboratory Results - last 24 hr











  02/07/18 02/07/18 02/08/18





  21:28 23:45 05:38


 


WBC   


 


RBC   


 


Hgb   


 


Hct   


 


MCV   


 


MCH   


 


MCHC Differential   


 


RDW   


 


Plt Count   


 


MPV   


 


Neutrophils %   


 


Lymphocytes %   


 


Monocytes %   


 


Eosinophils %   


 


Basophils %   


 


Specimen Source   


 


Sample Site   


 


pH   


 


pCO2   


 


pO2   


 


HCO3   


 


Base Excess   


 


O2 Saturation   


 


Glen Test   


 


Vent Rate   


 


Inspired O2   


 


Tidal Volume   


 


PEEP   


 


Pressure (ins/psv/peep)   


 


Critical Value   


 


Sodium   


 


Potassium   


 


Chloride   


 


Carbon Dioxide   


 


Anion Gap   


 


BUN   


 


Creatinine   


 


Est GFR ( Amer)   


 


Est GFR (Non-Af Amer)   


 


BUN/Creatinine Ratio   


 


Glucose   


 


POC Glucose  350 H  266 H  289 H


 


Calcium   


 


Total Bilirubin   


 


Direct Bilirubin   


 


AST   


 


ALT   


 


Alkaline Phosphatase   


 


Ammonia   


 


Total Protein   


 


Albumin   


 


Globulin   


 


Albumin/Globulin Ratio   














  02/08/18 02/08/18 02/08/18





  06:45 06:45 06:45


 


WBC  6.6  


 


RBC  4.30  


 


Hgb  12.5  


 


Hct  36.6 L  


 


MCV  85.2  


 


MCH  29.1  


 


MCHC Differential  34.2  


 


RDW  12.9  


 


Plt Count  301  


 


MPV  7.5  


 


Neutrophils %  58.5  


 


Lymphocytes %  29.3  


 


Monocytes %  10.6 H  


 


Eosinophils %  1.6  


 


Basophils %  0.0  


 


Specimen Source   


 


Sample Site   


 


pH   


 


pCO2   


 


pO2   


 


HCO3   


 


Base Excess   


 


O2 Saturation   


 


Glen Test   


 


Vent Rate   


 


Inspired O2   


 


Tidal Volume   


 


PEEP   


 


Pressure (ins/psv/peep)   


 


Critical Value   


 


Sodium   136 


 


Potassium   3.3 L 


 


Chloride   101 


 


Carbon Dioxide   25.7 


 


Anion Gap   12.6 


 


BUN   13 


 


Creatinine   0.6 L 


 


Est GFR ( Amer)   > 60.0 


 


Est GFR (Non-Af Amer)   > 60.0 


 


BUN/Creatinine Ratio   21.7 


 


Glucose   320 H 


 


POC Glucose   


 


Calcium   9.1 


 


Total Bilirubin   0.4 


 


Direct Bilirubin   0.04 


 


AST   22 


 


ALT   36 


 


Alkaline Phosphatase   55 


 


Ammonia    65 H


 


Total Protein   6.1 


 


Albumin   4.0 L 


 


Globulin   2.1 


 


Albumin/Globulin Ratio   1.9 H 














  02/08/18 02/08/18 02/08/18





  09:12 09:15 12:19


 


WBC   


 


RBC   


 


Hgb   


 


Hct   


 


MCV   


 


MCH   


 


MCHC Differential   


 


RDW   


 


Plt Count   


 


MPV   


 


Neutrophils %   


 


Lymphocytes %   


 


Monocytes %   


 


Eosinophils %   


 


Basophils %   


 


Specimen Source   Arterial 


 


Sample Site   Right Radial 


 


pH   7.41 


 


pCO2   42.0 


 


pO2   72.0 L 


 


HCO3   26.2 H 


 


Base Excess   1.7 


 


O2 Saturation   94.0 


 


Glen Test   YES 


 


Vent Rate   NA 


 


Inspired O2   21 


 


Tidal Volume   NA 


 


PEEP   NA 


 


Pressure (ins/psv/peep)   NA 


 


Critical Value   E.VAZQUEZ 


 


Sodium   


 


Potassium   


 


Chloride   


 


Carbon Dioxide   


 


Anion Gap   


 


BUN   


 


Creatinine   


 


Est GFR ( Amer)   


 


Est GFR (Non-Af Amer)   


 


BUN/Creatinine Ratio   


 


Glucose   


 


POC Glucose  362 H   326 H


 


Calcium   


 


Total Bilirubin   


 


Direct Bilirubin   


 


AST   


 


ALT   


 


Alkaline Phosphatase   


 


Ammonia   


 


Total Protein   


 


Albumin   


 


Globulin   


 


Albumin/Globulin Ratio   














  02/08/18





  17:55


 


WBC 


 


RBC 


 


Hgb 


 


Hct 


 


MCV 


 


MCH 


 


MCHC Differential 


 


RDW 


 


Plt Count 


 


MPV 


 


Neutrophils % 


 


Lymphocytes % 


 


Monocytes % 


 


Eosinophils % 


 


Basophils % 


 


Specimen Source 


 


Sample Site 


 


pH 


 


pCO2 


 


pO2 


 


HCO3 


 


Base Excess 


 


O2 Saturation 


 


Glen Test 


 


Vent Rate 


 


Inspired O2 


 


Tidal Volume 


 


PEEP 


 


Pressure (ins/psv/peep) 


 


Critical Value 


 


Sodium 


 


Potassium 


 


Chloride 


 


Carbon Dioxide 


 


Anion Gap 


 


BUN 


 


Creatinine 


 


Est GFR ( Amer) 


 


Est GFR (Non-Af Amer) 


 


BUN/Creatinine Ratio 


 


Glucose 


 


POC Glucose  277 H


 


Calcium 


 


Total Bilirubin 


 


Direct Bilirubin 


 


AST 


 


ALT 


 


Alkaline Phosphatase 


 


Ammonia 


 


Total Protein 


 


Albumin 


 


Globulin 


 


Albumin/Globulin Ratio 








 Microbiology











 02/07/18 01:54  - Final





 Nares    NO MRSA ISOLATED














- Assessment


Assessment: 





Early pna


acute bronchitis


generalized weakness


congestion





HTN


 DM


 CAD


 Asthma/COPD


hx CVA/TIA w/ left side weakness





- Plan


Plan: 





cardiology consultation


ivabx


accucheck with sliding scale 


contnue current orders

## 2018-02-08 NOTE — CONSULTATION
DATE OF CONSULTATION:  02/07/2018



REASON FOR CONSULTATION:  Shortness of breath.



CONSULT NOTE:  This is a first admission of this 52-year-old gentleman who has

no history of smoking with previous left-sided hemiplegia.  Also, hypertension,

diabetic mellitus, lives in a local convalescent home, was brought in up here

because of a couple of days of history of shortness of breath, coughing and also

some wheezing with questionable fever.  Subsequently, the patient was brought to

the hospital for further evaluation.  There was some concern of possibly acute

tracheobronchitis and so the patient was admitted for further care and necessary

treatment.  The patient denied of any chest pain, has questionable snoring,

slight dyspeptic symptomatology and also denies of any swelling of the legs and

is very minimally mobile and currently is disabled.



PAST MEDICAL HISTORY:  Hypertension, diabetes mellitus, CVA, history of morbid

obesity, suspect of obstructive sleep apnea syndrome, and history of morbid

obesity.



ALLERGIC HISTORY:  None.



PHYSICAL EXAMINATION:

GENERAL:  This is a middle-aged, slightly truncal obese gentleman, awake, alert,

oriented, not in acute distress.

VITAL SIGNS:  Temperature is 97.5, blood pressure 154/74, respirations low 20s,

saturation 97 on 3 liters.

HEENT:  Head is essentially unremarkable.  Pupils appear to be equal and

reacting to light.  Conjunctivae are slightly pallor.  Oral cavity shows fair to

poor dental hygiene.  Throat shows small oropharyngeal opening.

NECK:  No nodes in the neck could be palpated.  Neck appears to be very short.

CHEST:  Shows scattered wheezing with diminished air entry.

HEART:  Regular.

ABDOMEN:  Protuberant, soft, nontender.

EXTREMITIES:  Shows no peripheral edema.



LABORATORY DATA:  The patient's white count is 7.4, hematocrit is 37.  ABG, pO2

of 68 on room air with pCO2 of 42.  Electrolytes:  Potassium is 3.5 with sugar

of 219.



IMPRESSION:

1.  The patient has acute asthmatic bronchitis.

2.  Suspect severe obstructive sleep apnea syndrome.

3.  Gastroesophageal reflux disease.

4.  Hypertension.

5.  Diabetic mellitus with morbid obesity.



PLANS AND SUGGESTIONS:  We will go ahead and continue aggressive respiratory

care, inhalation treatment.  We will okay to give him prolonged antibiotic.  We

will repeat the blood gasses, etc. and also add inhaled steroid to current

treatment.  We will see how he does in next 24-48 hours and go from there.





DD: 02/07/2018 23:28

DT: 02/08/2018 02:50

[facility]# [id number]  [id number]

## 2018-02-08 NOTE — GENERAL PROGRESS NOTE
Subjective





- Review of Systems


Events since last encounter: 


c/o sob





Objective





- Results


Result Diagrams: 


 18 06:45





 18 06:45


Recent Labs: 


 Laboratory Last Values











WBC  6.6 Th/cmm (4.8-10.8)   18  06:45    


 


RBC  4.30 Mil/cmm (4.30-5.70)   18  06:45    


 


Hgb  12.5 gm/dL (12-16)   18  06:45    


 


Hct  36.6 % (41.0-60)  L  18  06:45    


 


MCV  85.2 fl (80-99)   18  06:45    


 


MCH  29.1 pg (26.0-30.0)   18  06:45    


 


MCHC Differential  34.2 pg (28.0-36.0)   18  06:45    


 


RDW  12.9 % (11.5-20.0)   18  06:45    


 


Plt Count  301 Th/cmm (150-400)   18  06:45    


 


MPV  7.5 fl  18  06:45    


 


Neutrophils %  58.5 % (40.0-80.0)   18  06:45    


 


Lymphocytes %  29.3 % (20.0-50.0)   18  06:45    


 


Monocytes %  10.6 % (2.0-10.0)  H  18  06:45    


 


Eosinophils %  1.6 % (0.0-5.0)   18  06:45    


 


Basophils %  0.0 % (0.0-2.0)   18  06:45    


 


Specimen Source  ARTERIAL   18  00:14    


 


Sample Site  Right Radial   18  00:14    


 


pH  7.41  (7.35-7.45)   18  00:14    


 


pCO2  42.0 mmHg (35.0-45.0)   18  00:14    


 


pO2  68.0 mmHg (80.0-100.0)  L  18  00:14    


 


HCO3  26.2 mEq/L (20.0-26.0)  H  18  00:14    


 


Base Excess  1.7 mEq/L (-3.0-3.0)   18  00:14    


 


O2 Saturation  93.0 % (92.0-100.0)   18  00:14    


 


Glen Test  Positive   18  00:14    


 


Vent Rate  N/A   18  00:14    


 


Inspired O2  21   18  00:14    


 


Tidal Volume  N/A   18  00:14    


 


PEEP  N/A   18  00:14    


 


Pressure (ins/psv/peep)  N/A   18  00:14    


 


Critical Value  MM   18  00:14    


 


Sodium  136 mEq/L (136-145)   18  06:45    


 


Potassium  3.3 mEq/L (3.5-5.1)  L  18  06:45    


 


Chloride  101 mEq/L ()   18  06:45    


 


Carbon Dioxide  25.7 mEq/L (21.0-31.0)   18  06:45    


 


Anion Gap  12.6  (7.0-16.0)   18  06:45    


 


BUN  13 mg/dL (7-25)   18  06:45    


 


Creatinine  0.6 mg/dL (0.7-1.3)  L  18  06:45    


 


Est GFR ( Amer)  > 60.0 ml/min (>90)   18  06:45    


 


Est GFR (Non-Af Amer)  > 60.0 ml/min  18  06:45    


 


BUN/Creatinine Ratio  21.7   18  06:45    


 


Glucose  320 mg/dL ()  H  18  06:45    


 


POC Glucose  289 MG/DL (70 - 105)  H  18  05:38    


 


Hemoglobin A1c %  10.0 % (4.0-6.0)  H  18  20:45    


 


Calcium  9.1 mg/dL (8.6-10.3)   18  06:45    


 


Total Bilirubin  0.4 mg/dL (0.3-1.0)   18  06:45    


 


Direct Bilirubin  0.04 mg/dL (0.0-0.2)   18  06:45    


 


AST  22 U/L (13-39)   18  06:45    


 


ALT  36 U/L (7-52)   18  06:45    


 


Alkaline Phosphatase  55 U/L ()   18  06:45    


 


Ammonia  65 umol/L (16-53)  H  18  06:45    


 


Total Protein  6.1 gm/dL (6.0-8.3)   18  06:45    


 


Albumin  4.0 gm/dL (4.2-5.5)  L  18  06:45    


 


Globulin  2.1 gm/dL  18  06:45    


 


Albumin/Globulin Ratio  1.9  (1.0-1.8)  H  18  06:45    














- Physical Exam


Vitals and I&O: 


 Vital Signs











Temp  97.7 F   18 07:39


 


Pulse  61   18 07:39


 


Resp  18   18 07:39


 


BP  144/67   18 07:39


 


Pulse Ox  95   18 07:39








 Intake & Output











 18





 18:59 06:59 18:59


 


Intake Total  1600 


 


Balance  1600 


 


Weight (lbs) 124.284 kg 124.738 kg 


 


Intake:   


 


  Oral  500 


 


  Tube Feeding  600 


 


  Other  500 


 


Other:   


 


  # Voids  4 


 


  # Bowel Movements  0 











Active Medications: 


Current Medications





Acetaminophen (Tylenol)  650 mg PO Q4HR PRN


   PRN Reason: PAIN OR FEVER >100.4


   Stop: 18 20:10


Acetaminophen/Hydrocodone Bitart (Norco 10 Mg/325 Mg)  1 tab PO Q6H PRN


   PRN Reason: body pain


   Stop: 18 16:21


   Last Admin: 18 00:47 Dose:  1 tab


Acetaminophen/Hydrocodone Bitart (Norco 10 Mg/325 Mg)  1 tab PO Q4H PRN


   PRN Reason: PAIN  


   Stop: 18 20:10


Albuterol Sulfate (Albuterol 2.5mg/3ml Neb Ud)  2.5 mg HHN Q4HRT [facility]


   Stop: 18 02:59


   Last Admin: 18 07:06 Dose:  2.5 mg


Aspirin (Aspirin Chewable)  81 mg PO DAILY [facility]


   Stop: 18 08:59


Benzocaine/Menthol (Cepacol)  1 josiah MM Q4H PRN


   PRN Reason: Sore Throat


Bisacodyl (Dulcolax 10 Mg Supp)  10 mg RC DAILY PRN


   PRN Reason: Constipation


   Stop: 18 20:10


Budesonide (Pulmicort)  0.5 mg HHN BIDRT [facility]


   Stop: 18 06:59


   Last Admin: 18 07:06 Dose:  0.5 mg


Carisoprodol (Soma)  350 mg PO TID [facility]


   Stop: 18 20:59


   Last Admin: 18 21:22 Dose:  350 mg


Clopidogrel Bisulfate (Plavix)  75 mg PO DAILY [facility]


   Stop: 18 08:59


Dextrose (D50w)  50 ml IVP PRN PRN


   PRN Reason: BS <70


   Stop: 18 20:10


Famotidine (Pepcid)  20 mg PO BID [facility]


   Stop: 18 08:59


Furosemide (Lasix)  40 mg IVP DAILY [facility]


   Stop: 18 08:59


   Last Admin: 18 08:49 Dose:  40 mg


Furosemide (Lasix)  40 mg PO DAILY [facility]


   Stop: 18 08:59


Gabapentin (Neurontin)  600 mg PO BID [facility]


   Stop: 18 08:59


Gemfibrozil (Lopid)  600 mg PO BID [facility]


   Stop: 18 08:59


Glucagon (Glucagen)  1 mg IM PRN PRN


   PRN Reason: IF BS <70


Hydralazine HCl (Apresoline)  50 mg PO TID [facility]


   Stop: 18 20:59


   Last Admin: 18 21:24 Dose:  50 mg


Insulin Aspart (Novolog Insulin Sliding Scale)  0 units SUBQ Q6HR [facility]


   PRN Reason: Protocol


   Stop: 18 05:59


   Last Admin: 18 06:08 Dose:  6 units


Insulin Detemir (Levemir Insulin)  15 units SUBQ QAM [facility]


   Stop: 18 08:59


Insulin Detemir (Levemir Insulin)  30 units SUBQ HS [facility]


   Stop: 18 20:59


   Last Admin: 18 21:33 Dose:  30 units


Insulin Human Regular (Novolin R)  0 units SUBQ ACHS [facility]


   PRN Reason: Protocol


   Stop: 18 20:59


   Last Admin: 18 22:56 Dose:  Not Given


Ipratropium Bromide (Atrovent Neb 0.5mg/2.5ml)  0.5 mg HHN Q6HRT [facility]


   Stop: 18 00:59


   Last Admin: 18 07:06 Dose:  0.5 mg


Lactulose (Cephulac)  30 gm PO TID PRN


   PRN Reason: Constipation


Losartan Potassium (Cozaar)  100 mg PO DAILY [facility]


   Stop: 18 08:59


Magnesium Hydroxide (Milk Of Magnesia)  30 ml PO DAILY PRN


   PRN Reason: Constipation


   Stop: 18 20:10


Metformin HCl (Glucophage)  850 mg PO BID [facility]


   Stop: 18 08:59


Methocarbamol (Robaxin)  500 mg PO Q6H [facility]


   Stop: 18 20:14


   Last Admin: 18 04:32 Dose:  500 mg


Miscellaneous (Menthol [Biofreeze])  4 ml TP Q4H PRN


   PRN Reason: LEFT SHOULDER AND LOWER PAIN


Miscellaneous (Tiotropium Bromide [Spiriva])  18 mcg INH DAILY [facility]


   Stop: 18 08:59


Pantoprazole Sodium (Protonix)  40 mg PO DAILY [facility]


   Stop: 18 08:59


Sodium Phosphate (Fleet Enema)  135 ml RC DAILY PRN


   PRN Reason: IF MOM OR DULCOLAX INEFFECTIVE


   Stop: 18 20:10


Zolpidem Tartrate (Ambien)  10 mg PO 1800 PRN


   PRN Reason: Insomnia


   Last Admin: 18 22:57 Dose:  10 mg











- Procedures


Procedures: 


 Procedures











Procedure Code Date


 


EXCISION OF DUODENUM, ENDO, DIAGN 9KB99LJ 17


 


EXCISION OF STOMACH, ENDO, DIAGN 3RS37RS 17














Nutritional Asmnt/Malnutr-PDOC





- Dietary Evaluation


Malnutrition Findings (Please click <Entered> for more info): 








Nutritional Asmnt/Malnutrition                             Start:  18 18:

11


Text:                                                      Status: Complete    

  


Freq:                                                                          

  


 Document     18 18:11  [facility]  (Rec: 18 18:18  [facility]


 Nutritional Asmnt/Malnutrition


     Patient General Information


      Nutritional Screening                      High Risk


      Diagnosis                                  early PNA, bronchitis


      Pertinent Medical Hx/Surgical Hx           HTN, DM, CAD, asthma/COPD, CVA


                                                 /TIA, PUD/GERD, appndectomy,


                                                 laparotomy, R tibial fracture


      Subjective Information                     Pt seen lying in bed at time


                                                 of visit, awake and alert. Pt


                                                 reported good appetite, the


                                                 food was small portion for him


                                                 .


      Current Diet Order/ Nutrition Support      low sodium


      Pertinent Medications                      lasix, novolog


      Pertinent Labs                              Cr 0.6, glucose 290, POC


                                                 276-314


                                                 / A1c 10.0


     Nutritional Hx/Data


      Height                                     1.8 m


      Height (Calculated Centimeters)            180.3


      Current Weight (lbs)                       124.284 kg


      Weight (Calculated Kilograms)              124.3


      Weight (Calculated Grams)                  959746.3


      Ideal Body Weight                          166


      % Ideal Body Weight                        165


      Body Mass Index (BMI)                      38.2


      Weight Status                              Obese


     GI Symptoms


      GI Symptoms                                None


      Last BM                                    


      Difficult in:                              None


      Usual diet at home                         pt stated he did not follow


                                                 any diet restrictions. He have


                                                 candy bars, chips everyday.


                                                 BS was stayed around 300.


      Skin Integrity/Comment:                    intact


     Estimated Nutritional Goals


      BEE in Kcals:                              Adj wt of IBW


      Calories/Kcals/Kg                          25-30


      Kcals Calculated                           8400-0582


      Protein:                                   Adj wt of IBW


      Protein g/k


      Protein Calculated                         88


      Fluid: ml                                  2200-2640ml (1ml/kcal)


     Nutritional Problem


      1. Problem


       Problem                                   altered nutrition related lab


                                                 values


       Etiology                                  hx of DM, excessive


                                                 carborhydrates intake


       Signs/Symptoms:                           glucose 290, -314


     Malnutrition Alert


      Protein-Calorie Malnutrition               N/A


      Is there a minimum of two criteria         No


       selected?                                 


       Query Text:Check all the applicable       


       criteria. A minimum of two criteria are   


       recommended for diagnosis of either       


       severe or non-severe malnutrition.        


     Intervention/Recommendation


      Comments                                   1. Recommend CCHO-60gm, double


                                                 protein and veggetables for


                                                 optimal glycemic control. RN


                                                 notified.


                                                 2. Diabetes education provide.


                                                 Pt showed interest.


                                                 3. Monitor PO intake, wt, labs


                                                 and skin integrity


                                                 4. F/U as moderate risk in 3-5


                                                 days, 2/10-


     Expected Outcomes/Goals


      Expected Outcomes/Goals                    1. PO intake to meet at least


                                                 75% of nutritional needs.


                                                 2. Wt stability, skin to


                                                 remain intact, labs to


                                                 approach WNL.

## 2018-02-08 NOTE — DIAGNOSTIC IMAGING REPORT
CHEST X-RAY: 2 views



INDICATION: Shortness of breath



COMPARISON: 2/6/2018



FINDINGS: There is elevation of the right hemidiaphragm.  No focal

consolidation or pleural effusions.  Borderline prominent heart is

noted.  Atherosclerosis of the aortic arch is noted.  Degenerative

changes of the spine are noted.



IMPRESSION:



No focal consolidation identified.



Persistent elevation of the right hemidiaphragm



Borderline prominent heart with atherosclerosis.

## 2018-02-09 LAB
ALBUMIN SERPL-MCNC: 4.2 GM/DL (ref 4.2–5.5)
ALBUMIN/GLOB SERPL: 1.9 {RATIO} (ref 1–1.8)
ALP SERPL-CCNC: 59 U/L (ref 34–104)
ALT SERPL-CCNC: 41 U/L (ref 7–52)
ANION GAP SERPL CALC-SCNC: 12.7 MMOL/L (ref 7–16)
AST SERPL-CCNC: 29 U/L (ref 13–39)
BASOPHILS # BLD AUTO: 0.1 TH/CUMM (ref 0–0.2)
BASOPHILS NFR BLD AUTO: 0.6 % (ref 0–2)
BILIRUB SERPL-MCNC: 0.3 MG/DL (ref 0.3–1)
BUN SERPL-MCNC: 16 MG/DL (ref 7–25)
CALCIUM SERPL-MCNC: 8.9 MG/DL (ref 8.6–10.3)
CHLORIDE SERPL-SCNC: 103 MEQ/L (ref 98–107)
CO2 SERPL-SCNC: 23 MEQ/L (ref 21–31)
CREAT SERPL-MCNC: 0.7 MG/DL (ref 0.7–1.3)
EOSINOPHIL # BLD AUTO: 0.1 TH/CMM (ref 0.1–0.4)
EOSINOPHIL NFR BLD AUTO: 1.1 % (ref 0–5)
ERYTHROCYTE [DISTWIDTH] IN BLOOD BY AUTOMATED COUNT: 13 % (ref 11.5–20)
GLOBULIN SER-MCNC: 2.2 GM/DL
GLUCOSE SERPL-MCNC: 264 MG/DL (ref 70–105)
HCT VFR BLD CALC: 35.5 % (ref 41–60)
HGB BLD-MCNC: 12.2 GM/DL (ref 12–16)
LYMPHOCYTE AB SER FC-ACNC: 1.5 TH/CMM (ref 1.5–3)
LYMPHOCYTES NFR BLD AUTO: 13.2 % (ref 20–50)
MCH RBC QN AUTO: 29.2 PG (ref 26–30)
MCHC RBC AUTO-ENTMCNC: 34.3 PG (ref 28–36)
MCV RBC AUTO: 85.2 FL (ref 80–99)
MONOCYTES # BLD AUTO: 1.1 TH/CMM (ref 0.3–1)
MONOCYTES NFR BLD AUTO: 9.7 % (ref 2–10)
NEUTROPHILS # BLD: 8.9 TH/CMM (ref 1.8–8)
NEUTROPHILS NFR BLD AUTO: 75.4 % (ref 40–80)
PLATELET # BLD: 288 TH/CMM (ref 150–400)
PMV BLD AUTO: 7.6 FL
POTASSIUM SERPL-SCNC: 3.7 MEQ/L (ref 3.5–5.1)
RBC # BLD AUTO: 4.17 MIL/CMM (ref 4.3–5.7)
SODIUM SERPL-SCNC: 135 MEQ/L (ref 136–145)
WBC # BLD AUTO: 11.7 TH/CMM (ref 4.8–10.8)

## 2018-02-09 RX ADMIN — INSULIN ASPART SCH UNITS: 100 INJECTION, SOLUTION INTRAVENOUS; SUBCUTANEOUS at 00:24

## 2018-02-09 RX ADMIN — IPRATROPIUM BROMIDE SCH: 0.5 SOLUTION RESPIRATORY (INHALATION) at 07:13

## 2018-02-09 RX ADMIN — INSULIN DETEMIR SCH: 100 INJECTION, SOLUTION SUBCUTANEOUS at 09:02

## 2018-02-09 RX ADMIN — ALBUTEROL SULFATE SCH MG: 2.5 SOLUTION RESPIRATORY (INHALATION) at 18:40

## 2018-02-09 RX ADMIN — PANTOPRAZOLE SODIUM SCH MG: 40 TABLET, DELAYED RELEASE ORAL at 08:54

## 2018-02-09 RX ADMIN — ASPIRIN 81 MG SCH MG: 81 TABLET ORAL at 08:55

## 2018-02-09 RX ADMIN — MORPHINE SULFATE PRN MG: 2 INJECTION, SOLUTION INTRAMUSCULAR; INTRAVENOUS at 18:23

## 2018-02-09 RX ADMIN — LACTULOSE SCH GM: 20 SOLUTION ORAL at 21:21

## 2018-02-09 RX ADMIN — ALBUTEROL SULFATE SCH MG: 2.5 SOLUTION RESPIRATORY (INHALATION) at 23:27

## 2018-02-09 RX ADMIN — INSULIN ASPART SCH UNITS: 100 INJECTION, SOLUTION INTRAVENOUS; SUBCUTANEOUS at 12:11

## 2018-02-09 RX ADMIN — BUDESONIDE SCH MG: 0.5 SUSPENSION RESPIRATORY (INHALATION) at 18:55

## 2018-02-09 RX ADMIN — LACTULOSE SCH GM: 20 SOLUTION ORAL at 14:51

## 2018-02-09 RX ADMIN — IPRATROPIUM BROMIDE SCH MG: 0.5 SOLUTION RESPIRATORY (INHALATION) at 12:58

## 2018-02-09 RX ADMIN — ALBUTEROL SULFATE SCH MG: 2.5 SOLUTION RESPIRATORY (INHALATION) at 12:58

## 2018-02-09 RX ADMIN — INSULIN ASPART SCH UNITS: 100 INJECTION, SOLUTION INTRAVENOUS; SUBCUTANEOUS at 18:56

## 2018-02-09 RX ADMIN — MORPHINE SULFATE PRN MG: 2 INJECTION, SOLUTION INTRAMUSCULAR; INTRAVENOUS at 21:22

## 2018-02-09 RX ADMIN — ALBUTEROL SULFATE SCH MG: 2.5 SOLUTION RESPIRATORY (INHALATION) at 16:01

## 2018-02-09 RX ADMIN — INSULIN ASPART SCH UNITS: 100 INJECTION, SOLUTION INTRAVENOUS; SUBCUTANEOUS at 05:48

## 2018-02-09 RX ADMIN — INSULIN ASPART SCH UNITS: 100 INJECTION, SOLUTION INTRAVENOUS; SUBCUTANEOUS at 12:08

## 2018-02-09 RX ADMIN — HYDROCODONE BITATRATE AND ACETAMINOPHEN PRN TAB: 10; 325 TABLET ORAL at 00:01

## 2018-02-09 RX ADMIN — ALBUTEROL SULFATE SCH: 2.5 SOLUTION RESPIRATORY (INHALATION) at 07:12

## 2018-02-09 RX ADMIN — LACTULOSE SCH GM: 20 SOLUTION ORAL at 08:55

## 2018-02-09 RX ADMIN — HYDROCODONE BITATRATE AND ACETAMINOPHEN PRN TAB: 10; 325 TABLET ORAL at 03:18

## 2018-02-09 RX ADMIN — IPRATROPIUM BROMIDE SCH MG: 0.5 SOLUTION RESPIRATORY (INHALATION) at 02:12

## 2018-02-09 RX ADMIN — ALBUTEROL SULFATE SCH MG: 2.5 SOLUTION RESPIRATORY (INHALATION) at 02:12

## 2018-02-09 RX ADMIN — MORPHINE SULFATE PRN MG: 2 INJECTION, SOLUTION INTRAMUSCULAR; INTRAVENOUS at 00:20

## 2018-02-09 RX ADMIN — MORPHINE SULFATE PRN MG: 2 INJECTION, SOLUTION INTRAMUSCULAR; INTRAVENOUS at 13:16

## 2018-02-09 RX ADMIN — BUDESONIDE SCH: 0.5 SUSPENSION RESPIRATORY (INHALATION) at 07:13

## 2018-02-09 RX ADMIN — MORPHINE SULFATE PRN MG: 2 INJECTION, SOLUTION INTRAMUSCULAR; INTRAVENOUS at 04:32

## 2018-02-09 RX ADMIN — HYDROCODONE BITATRATE AND ACETAMINOPHEN PRN TAB: 10; 325 TABLET ORAL at 16:13

## 2018-02-09 RX ADMIN — HYDROCODONE BITATRATE AND ACETAMINOPHEN PRN TAB: 10; 325 TABLET ORAL at 13:00

## 2018-02-09 RX ADMIN — HYDROCODONE BITATRATE AND ACETAMINOPHEN PRN TAB: 10; 325 TABLET ORAL at 08:51

## 2018-02-09 RX ADMIN — IPRATROPIUM BROMIDE SCH MG: 0.5 SOLUTION RESPIRATORY (INHALATION) at 18:40

## 2018-02-09 NOTE — PROGRESS NOTES
DATE:  02/08/2018



PULMONARY PROGRESS NOTE



PROBLEM LIST:

1.  Acute asthmatic bronchitis.

2.  Possibly gastroesophageal reflux.

3.  Cerebrovascular accident.

4.  Suspect obstructive sleep apnea syndrome and underlying history of

hypertension.



SYMPTOMS:  Nil.  The patient still coughs, but overall a little better.  No

respiratory distress, etc.



PHYSICAL EXAMINATION:

VITAL SIGNS:  Temperature is 98.2, blood pressure 116/87, saturation 91.

NECK:  Veins not visualized.

CHEST:  Shows occasional rhonchi with diminished air entry.

HEART:  Regular.

ABDOMEN:  Soft, nontender.

EXTREMITIES:  Shows no peripheral edema.



LABORATORY DATA:  The patient's chest x-ray shows slight elevation of the right

diaphragm, otherwise unremarkable.  White count is 6.6.  ABG: pO2 72 on room

air.  Potassium is 3.6.



ASSESSMENT:  The patient is clinically little better.  Asthmatic bronchitis is

quite symptomatic with coughing.  Overall, symptoms are much better.



PLANS AND SUGGESTIONS:  We will give some symptomatic treatment.  Continue other

treatment.  Hopefully, if he remains stable, may be in another day or two, he

can be discharged and go from there.  He needs to have a complete work for sleep

apnea syndrome.





DD: 02/08/2018 19:16

DT: 02/09/2018 02:11

JOB# [id number]  [id number]
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
## 2018-02-09 NOTE — CONSULTATION
DATE OF CONSULTATION:  02/08/2018



REFERRING PHYSICIAN:  Dr. Varela.



Thank you very much.



HISTORY OF PRESENT ILLNESS:  This is a 52-year-old male who presents with cough,

congestion, shortness of breath and wheezing.  The patient stated he has some

inhalers at home, but it does not help him much.  He denies history of lung

problems.  Denies history of smoking.  His other past medical history



DICTATION ENDS HERE





DD: 02/08/2018 15:59

DT: 02/09/2018 07:37

[facility]# [id number]  [id number]

## 2018-02-09 NOTE — INTERNAL MEDICINE PROG NOTE
Internal Medicine Subjective





- Subjective


Service Date: 18


Patient seen and examined:: with staff


Patient is:: awake


Patient Complaints of:: congestion


Per staff patient has:: tolerating meds





Internal Medicine Objective





- Results


Result Diagrams: 


 18 04:50





 18 04:50


Recent Labs: 


 Laboratory Last Values











WBC  11.7 Th/cmm (4.8-10.8)  H D 18  04:50    


 


RBC  4.17 Mil/cmm (4.30-5.70)  L  18  04:50    


 


Hgb  12.2 gm/dL (12-16)   18  04:50    


 


Hct  35.5 % (41.0-60)  L  18  04:50    


 


MCV  85.2 fl (80-99)   18  04:50    


 


MCH  29.2 pg (26.0-30.0)   18  04:50    


 


MCHC Differential  34.3 pg (28.0-36.0)   18  04:50    


 


RDW  13.0 % (11.5-20.0)   18  04:50    


 


Plt Count  288 Th/cmm (150-400)   18  04:50    


 


MPV  7.6 fl  18  04:50    


 


Neutrophils %  75.4 % (40.0-80.0)   18  04:50    


 


Lymphocytes %  13.2 % (20.0-50.0)  L  18  04:50    


 


Monocytes %  9.7 % (2.0-10.0)   18  04:50    


 


Eosinophils %  1.1 % (0.0-5.0)   18  04:50    


 


Basophils %  0.6 % (0.0-2.0)   18  04:50    


 


Specimen Source  Arterial   18  09:15    


 


Sample Site  Right Radial   18  09:15    


 


pH  7.41  (7.35-7.45)   18  09:15    


 


pCO2  42.0 mmHg (35.0-45.0)   18  09:15    


 


pO2  72.0 mmHg (80.0-100.0)  L  18  09:15    


 


HCO3  26.2 mEq/L (20.0-26.0)  H  18  09:15    


 


Base Excess  1.7 mEq/L (-3.0-3.0)   18  09:15    


 


O2 Saturation  94.0 % (92.0-100.0)   18  09:15    


 


Glen Test  YES   18  09:15    


 


Vent Rate  NA   18  09:15    


 


Inspired O2  21   18  09:15    


 


Tidal Volume  NA   18  09:15    


 


PEEP  NA   18  09:15    


 


Pressure (ins/psv/peep)  NA   18  09:15    


 


Critical Value  E.VAZQUEZ   18  09:15    


 


Sodium  135 mEq/L (136-145)  L  18  04:50    


 


Potassium  3.7 mEq/L (3.5-5.1)   18  04:50    


 


Chloride  103 mEq/L ()   18  04:50    


 


Carbon Dioxide  23.0 mEq/L (21.0-31.0)   18  04:50    


 


Anion Gap  12.7  (7.0-16.0)   18  04:50    


 


BUN  16 mg/dL (7-25)   18  04:50    


 


Creatinine  0.7 mg/dL (0.7-1.3)   18  04:50    


 


Est GFR ( Amer)  > 60.0 ml/min (>90)   18  04:50    


 


Est GFR (Non-Af Amer)  > 60.0 ml/min  18  04:50    


 


BUN/Creatinine Ratio  22.9   18  04:50    


 


Glucose  264 mg/dL ()  H  18  04:50    


 


POC Glucose  239 MG/DL (70 - 105)  H  18  11:59    


 


Hemoglobin A1c %  10.0 % (4.0-6.0)  H  18  20:45    


 


Calcium  8.9 mg/dL (8.6-10.3)   18  04:50    


 


Total Bilirubin  0.3 mg/dL (0.3-1.0)   18  04:50    


 


Direct Bilirubin  0.04 mg/dL (0.0-0.2)   18  06:45    


 


AST  29 U/L (13-39)   18  04:50    


 


ALT  41 U/L (7-52)   18  04:50    


 


Alkaline Phosphatase  59 U/L ()   18  04:50    


 


Ammonia  65 umol/L (16-53)  H  18  06:45    


 


Total Protein  6.4 gm/dL (6.0-8.3)   18  04:50    


 


Albumin  4.2 gm/dL (4.2-5.5)   18  04:50    


 


Globulin  2.2 gm/dL  18  04:50    


 


Albumin/Globulin Ratio  1.9  (1.0-1.8)  H  18  04:50    


 


Free T3  2.7 pg/mL (2.0-4.4)   18  06:45    














- Physical Exam


Vitals and I&O: 


 Vital Signs











Temp  98.7 F   18 15:23


 


Pulse  93   18 16:01


 


Resp  22   18 16:01


 


BP  135/82   18 15:23


 


Pulse Ox  98   18 16:01








 Intake & Output











 18





 18:59 06:59 18:59


 


Intake Total  950 200


 


Output Total  500 221


 


Balance  450 -21


 


Weight (lbs)  275 lb 275 lb


 


Intake:   


 


  Oral  950 200


 


Output:   


 


  Urine  500 220


 


  Stool   1


 


Other:   


 


  # Voids  4 


 


  # Bowel Movements  3 











Active Medications: 


Current Medications





Acetaminophen (Tylenol)  650 mg PO Q4HR PRN


   PRN Reason: PAIN OR FEVER >100.4


   Stop: 18 20:10


   Last Admin: 18 05:50 Dose:  650 mg


Acetaminophen/Hydrocodone Bitart (Norco 10 Mg/325 Mg)  1 tab PO Q6H PRN


   PRN Reason: body pain


   Stop: 18 16:21


   Last Admin: 18 16:13 Dose:  1 tab


Acetaminophen/Hydrocodone Bitart (Norco 10 Mg/325 Mg)  1 tab PO Q4H PRN


   PRN Reason: PAIN  


   Stop: 18 20:10


   Last Admin: 18 13:00 Dose:  1 tab


Albuterol Sulfate (Albuterol 2.5mg/3ml Neb Ud)  2.5 mg HHN Q4HRT [facility]


   Stop: 18 02:59


   Last Admin: 18 16:01 Dose:  2.5 mg


Aspirin (Aspirin Chewable)  81 mg PO DAILY [facility]


   Stop: 18 08:59


   Last Admin: 18 08:55 Dose:  81 mg


Benzocaine/Menthol (Cepacol)  1 josiah MM Q4H PRN


   PRN Reason: Sore Throat


Bisacodyl (Dulcolax 10 Mg Supp)  10 mg RC DAILY PRN


   PRN Reason: Constipation


   Stop: 18 20:10


Budesonide (Pulmicort)  0.5 mg HHN BIDRT [facility]


   Stop: 18 06:59


   Last Admin: 18 07:13 Dose:  Not Given


Camphor/Menthol (Bengay Greaseless 10%-15%)  1 appl TP Q4H PRN


   PRN Reason: LEFT SHOULDER AND LOWER PAIN


Carisoprodol (Soma)  350 mg PO TID [facility]


   Stop: 18 20:59


   Last Admin: 18 14:50 Dose:  350 mg


Clopidogrel Bisulfate (Plavix)  75 mg PO DAILY [facility]


   Stop: 18 08:59


   Last Admin: 18 08:54 Dose:  75 mg


Dextrose (D50w)  50 ml IVP PRN PRN


   PRN Reason: BS <70


   Stop: 18 20:10


Famotidine (Pepcid)  20 mg PO BID [facility]


   Stop: 18 08:59


   Last Admin: 18 16:14 Dose:  20 mg


Furosemide (Lasix)  40 mg IVP DAILY [facility]


   Stop: 18 08:59


   Last Admin: 18 08:55 Dose:  40 mg


Furosemide (Lasix)  40 mg PO DAILY [facility]


   Stop: 18 08:59


   Last Admin: 18 08:54 Dose:  40 mg


Gabapentin (Neurontin)  600 mg PO BID [facility]


   Stop: 18 08:59


   Last Admin: 18 16:13 Dose:  600 mg


Gemfibrozil (Lopid)  600 mg PO BID [facility]


   Stop: 18 08:59


   Last Admin: 18 16:13 Dose:  600 mg


Glucagon (Glucagen)  1 mg IM PRN PRN


   PRN Reason: IF BS <70


Guaifenesin/Codeine Phosphate (Robitussin Ac)  10 ml PO Q6HR PRN


   PRN Reason: Allergy Symptoms


   Stop: 18 19:16


Hydralazine HCl (Apresoline)  50 mg PO TID [facility]


   Stop: 18 20:59


   Last Admin: 18 14:50 Dose:  50 mg


Azithromycin 500 mg/ Sodium (Chloride)  250 mls @ 250 mls/hr IV Q24H [facility]


   Stop: 18 17:59


   Last Admin: 18 18:03 Dose:  250 mls/hr


Insulin Aspart (Novolog Insulin Sliding Scale)  0 units SUBQ Q6HR [facility]


   PRN Reason: Protocol


   Stop: 18 05:59


   Last Admin: 18 12:11 Dose:  4 units


Insulin Detemir (Levemir Insulin)  15 units SUBQ QAM [facility]


   Stop: 18 08:59


   Last Admin: 18 09:02 Dose:  Not Given


Insulin Detemir (Levemir Insulin)  30 units SUBQ HS [facility]


   Stop: 18 20:59


   Last Admin: 18 20:58 Dose:  30 units


Ipratropium Bromide (Atrovent Neb 0.5mg/2.5ml)  0.5 mg HHN Q6HRT [facility]


   Stop: 18 00:59


   Last Admin: 18 12:58 Dose:  0.5 mg


Lactulose (Cephulac)  20 gm PO TID [facility]


   Stop: 18 20:59


   Last Admin: 18 14:51 Dose:  20 gm


Lorazepam (Ativan)  1 mg IVP Q6HR PRN; Protocol


   PRN Reason: Agitation


   Last Admin: 18 00:59 Dose:  1 mg


Losartan Potassium (Cozaar)  100 mg PO DAILY [facility]


   Stop: 18 08:59


   Last Admin: 18 08:51 Dose:  100 mg


Magnesium Hydroxide (Milk Of Magnesia)  30 ml PO DAILY PRN


   PRN Reason: Constipation


   Stop: 18 20:10


Metformin HCl (Glucophage)  850 mg PO BID [facility]


   Stop: 18 08:59


   Last Admin: 18 16:14 Dose:  Not Given


Methocarbamol (Robaxin)  500 mg PO Q6H [facility]


   Stop: 18 20:14


   Last Admin: 18 15:57 Dose:  500 mg


Morphine Sulfate (Morphine)  1 mg IVP Q4HR PRN


   PRN Reason: Severe Pain


   Stop: 04/10/18 00:12


   Last Admin: 18 13:16 Dose:  1 mg


Pantoprazole Sodium (Protonix)  40 mg PO DAILY [facility]


   Stop: 18 08:59


   Last Admin: 18 08:54 Dose:  40 mg


Sodium Phosphate (Fleet Enema)  135 ml RC DAILY PRN


   PRN Reason: IF MOM OR DULCOLAX INEFFECTIVE


   Stop: 18 20:10


Zolpidem Tartrate (Ambien)  10 mg PO 1800 PRN


   PRN Reason: Insomnia


   Last Admin: 18 20:56 Dose:  10 mg








General: weak


HEENT: NC/AT, PERRLA


Neck: Supple


Lungs: CTAB


Abdomen: soft, non-tender





- Procedures


Procedures: 


 Procedures











Procedure Code Date


 


EXCISION OF DUODENUM, ENDO, DIAGN [id number] 17


 


EXCISION OF STOMACH, ENDO, DIAGN [id number] 17














Internal Medicine Assmt/Plan





- Assessment


Assessment: 








Early pna


acute bronchitis


generalized weakness


congestion





HTN


 DM


 CAD


 Asthma/COPD


hx CVA/TIA w/ left side weakness





- Plan


Plan: 





ivabx


bronchodilators and supplemental oxygen


continue current plan of care 





Nutritional Asmnt/Malnutr-PDOC





- Dietary Evaluation


Malnutrition Findings (Please click <Entered> for more info): 








Nutritional Asmnt/Malnutrition                             Start:  18 18:

11


Text:                                                      Status: Complete    

  


Freq:                                                                          

  


 Document     18 18:11  LUCRETIA  (Rec: 18 18:18  LUCRETIA  [id number])


 Nutritional Asmnt/Malnutrition


     Patient General Information


      Nutritional Screening                      High Risk


      Diagnosis                                  early PNA, bronchitis


      Pertinent Medical Hx/Surgical Hx           HTN, DM, CAD, asthma/COPD, CVA


                                                 /TIA, PUD/GERD, appndectomy,


                                                 laparotomy, R tibial fracture


      Subjective Information                     Pt seen lying in bed at time


                                                 of visit, awake and alert. Pt


                                                 reported good appetite, the


                                                 food was small portion for him


                                                 .


      Current Diet Order/ Nutrition Support      low sodium


      Pertinent Medications                      lasix, novolog


      Pertinent Labs                              Cr 0.6, glucose 290, POC


                                                 276-314


                                                 2/ A1c 10.0


     Nutritional Hx/Data


      Height                                     5 ft 11 in


      Height (Calculated Centimeters)            180.3


      Current Weight (lbs)                       274 lb


      Weight (Calculated Kilograms)              124.3


      Weight (Calculated Grams)                  770338.3


      Ideal Body Weight                          166


      % Ideal Body Weight                        165


      Body Mass Index (BMI)                      38.2


      Weight Status                              Obese


     GI Symptoms


      GI Symptoms                                None


      Last BM                                    


      Difficult in:                              None


      Usual diet at home                         pt stated he did not follow


                                                 any diet restrictions. He have


                                                 candy bars, chips everyday.


                                                 BS was stayed around 300.


      Skin Integrity/Comment:                    intact


     Estimated Nutritional Goals


      BEE in Kcals:                              Adj wt of IBW


      Calories/Kcals/Kg                          25-30


      Kcals Calculated                           4042-5376


      Protein:                                   Adj wt of IBW


      Protein g/k


      Protein Calculated                         88


      Fluid: ml                                  2200-2640ml (1ml/kcal)


     Nutritional Problem


      1. Problem


       Problem                                   altered nutrition related lab


                                                 values


       Etiology                                  hx of DM, excessive


                                                 carborhydrates intake


       Signs/Symptoms:                           glucose 290, -314


     Malnutrition Alert


      Protein-Calorie Malnutrition               N/A


      Is there a minimum of two criteria         No


       selected?                                 


       Query Text:Check all the applicable       


       criteria. A minimum of two criteria are   


       recommended for diagnosis of either       


       severe or non-severe malnutrition.        


     Intervention/Recommendation


      Comments                                   1. Recommend CCHO-60gm, double


                                                 protein and veggetables for


                                                 optimal glycemic control. RN


                                                 notified.


                                                 2. Diabetes education provide.


                                                 Pt showed interest.


                                                 3. Monitor PO intake, wt, labs


                                                 and skin integrity


                                                 4. F/U as moderate risk in 3-5


                                                 days, 2/10-


     Expected Outcomes/Goals


      Expected Outcomes/Goals                    1. PO intake to meet at least


                                                 75% of nutritional needs.


                                                 2. Wt stability, skin to


                                                 remain intact, labs to


                                                 approach WNL.

## 2018-02-10 LAB
ALBUMIN SERPL-MCNC: 4.1 GM/DL (ref 4.2–5.5)
ALBUMIN/GLOB SERPL: 1.4 {RATIO} (ref 1–1.8)
ALP SERPL-CCNC: 57 U/L (ref 34–104)
ALT SERPL-CCNC: 32 U/L (ref 7–52)
ANION GAP SERPL CALC-SCNC: 12.9 MMOL/L (ref 7–16)
AST SERPL-CCNC: 18 U/L (ref 13–39)
BASOPHILS # BLD AUTO: 0 TH/CUMM (ref 0–0.2)
BASOPHILS NFR BLD AUTO: 0.5 % (ref 0–2)
BILIRUB SERPL-MCNC: 0.3 MG/DL (ref 0.3–1)
BUN SERPL-MCNC: 17 MG/DL (ref 7–25)
CALCIUM SERPL-MCNC: 9.1 MG/DL (ref 8.6–10.3)
CHLORIDE SERPL-SCNC: 101 MEQ/L (ref 98–107)
CO2 SERPL-SCNC: 25.6 MEQ/L (ref 21–31)
CREAT SERPL-MCNC: 0.7 MG/DL (ref 0.7–1.3)
EOSINOPHIL # BLD AUTO: 0.1 TH/CMM (ref 0.1–0.4)
EOSINOPHIL NFR BLD AUTO: 1.5 % (ref 0–5)
ERYTHROCYTE [DISTWIDTH] IN BLOOD BY AUTOMATED COUNT: 13.2 % (ref 11.5–20)
GLOBULIN SER-MCNC: 2.9 GM/DL
GLUCOSE SERPL-MCNC: 279 MG/DL (ref 70–105)
HCT VFR BLD CALC: 36.8 % (ref 41–60)
HGB BLD-MCNC: 12.8 GM/DL (ref 12–16)
LYMPHOCYTE AB SER FC-ACNC: 1.7 TH/CMM (ref 1.5–3)
LYMPHOCYTES NFR BLD AUTO: 18.6 % (ref 20–50)
MCH RBC QN AUTO: 29.8 PG (ref 26–30)
MCHC RBC AUTO-ENTMCNC: 34.9 PG (ref 28–36)
MCV RBC AUTO: 85.4 FL (ref 80–99)
MONOCYTES # BLD AUTO: 1.2 TH/CMM (ref 0.3–1)
MONOCYTES NFR BLD AUTO: 12.8 % (ref 2–10)
NEUTROPHILS # BLD: 6.1 TH/CMM (ref 1.8–8)
NEUTROPHILS NFR BLD AUTO: 66.6 % (ref 40–80)
PLATELET # BLD: 294 TH/CMM (ref 150–400)
PMV BLD AUTO: 7.6 FL
POTASSIUM SERPL-SCNC: 3.5 MEQ/L (ref 3.5–5.1)
RBC # BLD AUTO: 4.31 MIL/CMM (ref 4.3–5.7)
SODIUM SERPL-SCNC: 136 MEQ/L (ref 136–145)
WBC # BLD AUTO: 9.1 TH/CMM (ref 4.8–10.8)

## 2018-02-10 RX ADMIN — MORPHINE SULFATE PRN MG: 4 INJECTION, SOLUTION INTRAMUSCULAR; INTRAVENOUS at 06:55

## 2018-02-10 RX ADMIN — INSULIN ASPART SCH UNITS: 100 INJECTION, SOLUTION INTRAVENOUS; SUBCUTANEOUS at 17:35

## 2018-02-10 RX ADMIN — IPRATROPIUM BROMIDE SCH MG: 0.5 SOLUTION RESPIRATORY (INHALATION) at 01:23

## 2018-02-10 RX ADMIN — INSULIN DETEMIR SCH UNITS: 100 INJECTION, SOLUTION SUBCUTANEOUS at 01:50

## 2018-02-10 RX ADMIN — HYDROCODONE BITATRATE AND ACETAMINOPHEN PRN TAB: 10; 325 TABLET ORAL at 04:47

## 2018-02-10 RX ADMIN — INSULIN ASPART SCH UNITS: 100 INJECTION, SOLUTION INTRAVENOUS; SUBCUTANEOUS at 12:09

## 2018-02-10 RX ADMIN — ALBUTEROL SULFATE SCH MG: 2.5 SOLUTION RESPIRATORY (INHALATION) at 18:46

## 2018-02-10 RX ADMIN — ALBUTEROL SULFATE SCH MG: 2.5 SOLUTION RESPIRATORY (INHALATION) at 03:33

## 2018-02-10 RX ADMIN — ALBUTEROL SULFATE SCH: 2.5 SOLUTION RESPIRATORY (INHALATION) at 15:32

## 2018-02-10 RX ADMIN — ALBUTEROL SULFATE SCH: 2.5 SOLUTION RESPIRATORY (INHALATION) at 11:27

## 2018-02-10 RX ADMIN — IPRATROPIUM BROMIDE SCH: 0.5 SOLUTION RESPIRATORY (INHALATION) at 07:20

## 2018-02-10 RX ADMIN — INSULIN DETEMIR SCH UNITS: 100 INJECTION, SOLUTION SUBCUTANEOUS at 08:35

## 2018-02-10 RX ADMIN — LACTULOSE SCH: 20 SOLUTION ORAL at 14:05

## 2018-02-10 RX ADMIN — INSULIN ASPART SCH UNITS: 100 INJECTION, SOLUTION INTRAVENOUS; SUBCUTANEOUS at 06:54

## 2018-02-10 RX ADMIN — BUDESONIDE SCH MG: 0.5 SUSPENSION RESPIRATORY (INHALATION) at 18:46

## 2018-02-10 RX ADMIN — LACTULOSE SCH: 20 SOLUTION ORAL at 21:00

## 2018-02-10 RX ADMIN — PANTOPRAZOLE SODIUM SCH MG: 40 TABLET, DELAYED RELEASE ORAL at 08:41

## 2018-02-10 RX ADMIN — LACTULOSE SCH: 20 SOLUTION ORAL at 03:37

## 2018-02-10 RX ADMIN — MORPHINE SULFATE PRN MG: 4 INJECTION, SOLUTION INTRAMUSCULAR; INTRAVENOUS at 11:52

## 2018-02-10 RX ADMIN — ALBUTEROL SULFATE SCH MG: 2.5 SOLUTION RESPIRATORY (INHALATION) at 22:32

## 2018-02-10 RX ADMIN — LACTULOSE SCH: 20 SOLUTION ORAL at 08:44

## 2018-02-10 RX ADMIN — HYDROCODONE BITATRATE AND ACETAMINOPHEN PRN TAB: 10; 325 TABLET ORAL at 14:02

## 2018-02-10 RX ADMIN — HYDROCODONE BITATRATE AND ACETAMINOPHEN PRN TAB: 10; 325 TABLET ORAL at 22:35

## 2018-02-10 RX ADMIN — INSULIN ASPART SCH UNITS: 100 INJECTION, SOLUTION INTRAVENOUS; SUBCUTANEOUS at 01:49

## 2018-02-10 RX ADMIN — ASPIRIN 81 MG SCH MG: 81 TABLET ORAL at 08:41

## 2018-02-10 RX ADMIN — MORPHINE SULFATE PRN MG: 2 INJECTION, SOLUTION INTRAMUSCULAR; INTRAVENOUS at 08:25

## 2018-02-10 RX ADMIN — MORPHINE SULFATE PRN MG: 2 INJECTION, SOLUTION INTRAMUSCULAR; INTRAVENOUS at 15:53

## 2018-02-10 RX ADMIN — MORPHINE SULFATE PRN MG: 4 INJECTION, SOLUTION INTRAMUSCULAR; INTRAVENOUS at 01:19

## 2018-02-10 RX ADMIN — MORPHINE SULFATE PRN MG: 2 INJECTION, SOLUTION INTRAMUSCULAR; INTRAVENOUS at 20:55

## 2018-02-10 RX ADMIN — INSULIN DETEMIR SCH UNITS: 100 INJECTION, SOLUTION SUBCUTANEOUS at 20:38

## 2018-02-10 RX ADMIN — IPRATROPIUM BROMIDE SCH MG: 0.5 SOLUTION RESPIRATORY (INHALATION) at 18:45

## 2018-02-10 RX ADMIN — BUDESONIDE SCH: 0.5 SUSPENSION RESPIRATORY (INHALATION) at 07:20

## 2018-02-10 RX ADMIN — MORPHINE SULFATE PRN MG: 4 INJECTION, SOLUTION INTRAMUSCULAR; INTRAVENOUS at 23:21

## 2018-02-10 RX ADMIN — IPRATROPIUM BROMIDE SCH: 0.5 SOLUTION RESPIRATORY (INHALATION) at 15:31

## 2018-02-10 RX ADMIN — ALBUTEROL SULFATE SCH: 2.5 SOLUTION RESPIRATORY (INHALATION) at 07:20

## 2018-02-10 NOTE — DIAGNOSTIC IMAGING REPORT
Left hip (3 views)



HISTORY: Pain



The exam demonstrates a mildly displaced comminuted intratrochanteric

fracture.  The femoral head remains located within the acetabulum.



IMPRESSION:

1.  Mildly displaced comminuted intertrochanteric fracture

## 2018-02-10 NOTE — PROGRESS NOTES
DATE:  02/10/2018



SUBJECTIVE:  The patient was seen in his room, lying in the bed, having dinner. 

The patient denies any pain or discomfort at this time.  Otherwise, the patient

appears to be in no acute distress.



OBJECTIVE:

VITAL SIGNS:  Temperature 98.4, heart rate 94, blood pressure 141/84,

respirations of 20, 96% on room air.

HEENT:  Head is atraumatic and normocephalic.  Eyes:  Bilateral conjunctivae are

clear.  Bilateral pupils are equally round and reactive.

NECK:  Supple.  No JVD.

CARDIOVASCULAR:  S1 and S2 without murmur.

PULMONARY:  Clear to auscultation.

GASTROINTESTINAL:  Soft and nontender without guarding.  Positive bowel sounds.

MUSCULOSKELETAL:  No clubbing.  No cyanosis noted.



ASSESSMENT:

1.  Pneumonia.

2.  Hypertension.

3.  Diabetes mellitus.

4.  Coronary artery disease.

5.  Chronic obstructive pulmonary disease.

6.  Left hip fracture.



PLAN:  We will continue current antibiotics and follow up with the orthopedic

doctor for management of the fracture.  We are also going to provide the pain

management as needed.  Treatment plans were discussed with the patient's nurse. 

Treatment plans were discussed with Dr. Varela.





DD: 02/10/2018 18:44

DT: 02/10/2018 21:24

JOB# [id number]  [id number]

## 2018-02-10 NOTE — DIAGNOSTIC IMAGING REPORT
Left tibia/fibula (2 views, portable)



HISTORY: Pain, fall



No acute bony amenities.  No fractures.



IMPRESSION:

1.  No acute focal bony abnormalities

## 2018-02-10 NOTE — GENERAL PROGRESS NOTE
Subjective





- Review of Systems


Service Date: 02/10/18


Subjective: 





Ortho Consult DICTATED





52 y.o. male fell in hospital 18.  XR show a fracture left hip





PLAN:  Medical clearance, consent and surgery 18  ORIF.





Thanks





Markos Samayoa M.D.





Objective





- Results


Result Diagrams: 


 02/10/18 05:00





 02/10/18 05:00


Recent Labs: 


 Laboratory Last Values











WBC  9.1 Th/cmm (4.8-10.8)  D 02/10/18  05:00    


 


RBC  4.31 Mil/cmm (4.30-5.70)   02/10/18  05:00    


 


Hgb  12.8 gm/dL (12-16)   02/10/18  05:00    


 


Hct  36.8 % (41.0-60)  L  02/10/18  05:00    


 


MCV  85.4 fl (80-99)   02/10/18  05:00    


 


MCH  29.8 pg (26.0-30.0)   02/10/18  05:00    


 


MCHC Differential  34.9 pg (28.0-36.0)   02/10/18  05:00    


 


RDW  13.2 % (11.5-20.0)   02/10/18  05:00    


 


Plt Count  294 Th/cmm (150-400)   02/10/18  05:00    


 


MPV  7.6 fl  02/10/18  05:00    


 


Neutrophils %  66.6 % (40.0-80.0)   02/10/18  05:00    


 


Lymphocytes %  18.6 % (20.0-50.0)  L  02/10/18  05:00    


 


Monocytes %  12.8 % (2.0-10.0)  H  02/10/18  05:00    


 


Eosinophils %  1.5 % (0.0-5.0)   02/10/18  05:00    


 


Basophils %  0.5 % (0.0-2.0)   02/10/18  05:00    


 


Specimen Source  Arterial   18  09:15    


 


Sample Site  Right Radial   18  09:15    


 


pH  7.41  (7.35-7.45)   18  09:15    


 


pCO2  42.0 mmHg (35.0-45.0)   18  09:15    


 


pO2  72.0 mmHg (80.0-100.0)  L  18  09:15    


 


HCO3  26.2 mEq/L (20.0-26.0)  H  18  09:15    


 


Base Excess  1.7 mEq/L (-3.0-3.0)   18  09:15    


 


O2 Saturation  94.0 % (92.0-100.0)   18  09:15    


 


Glen Test  YES   18  09:15    


 


Vent Rate  NA   18  09:15    


 


Inspired O2  21   18  09:15    


 


Tidal Volume  NA   18  09:15    


 


PEEP  NA   18  09:15    


 


Pressure (ins/psv/peep)  NA   18  09:15    


 


Critical Value  E.VAZQUEZ   18  09:15    


 


Sodium  136 mEq/L (136-145)   02/10/18  05:00    


 


Potassium  3.5 mEq/L (3.5-5.1)   02/10/18  05:00    


 


Chloride  101 mEq/L ()   02/10/18  05:00    


 


Carbon Dioxide  25.6 mEq/L (21.0-31.0)   02/10/18  05:00    


 


Anion Gap  12.9  (7.0-16.0)   02/10/18  05:00    


 


BUN  17 mg/dL (7-25)   02/10/18  05:00    


 


Creatinine  0.7 mg/dL (0.7-1.3)   02/10/18  05:00    


 


Est GFR ( Amer)  > 60.0 ml/min (>90)   02/10/18  05:00    


 


Est GFR (Non-Af Amer)  > 60.0 ml/min  02/10/18  05:00    


 


BUN/Creatinine Ratio  24.3   02/10/18  05:00    


 


Glucose  279 mg/dL ()  H  02/10/18  05:00    


 


POC Glucose  254 MG/DL (70 - 105)  H  02/10/18  08:34    


 


Hemoglobin A1c %  10.0 % (4.0-6.0)  H  18  20:45    


 


Calcium  9.1 mg/dL (8.6-10.3)   02/10/18  05:00    


 


Total Bilirubin  0.3 mg/dL (0.3-1.0)   02/10/18  05:00    


 


Direct Bilirubin  0.04 mg/dL (0.0-0.2)   18  06:45    


 


AST  18 U/L (13-39)   02/10/18  05:00    


 


ALT  32 U/L (7-52)   02/10/18  05:00    


 


Alkaline Phosphatase  57 U/L ()   02/10/18  05:00    


 


Ammonia  65 umol/L (16-53)  H  18  06:45    


 


Total Protein  7.0 gm/dL (6.0-8.3)   02/10/18  05:00    


 


Albumin  4.1 gm/dL (4.2-5.5)  L  02/10/18  05:00    


 


Globulin  2.9 gm/dL  02/10/18  05:00    


 


Albumin/Globulin Ratio  1.4  (1.0-1.8)   02/10/18  05:00    


 


Free T3  2.7 pg/mL (2.0-4.4)   18  06:45    














- Physical Exam


Vitals and I&O: 


 Vital Signs











Temp  99.0 F   02/10/18 05:58


 


Pulse  95   02/10/18 08:42


 


Resp  20   02/10/18 07:25


 


BP  141/77   02/10/18 08:42


 


Pulse Ox  94   02/10/18 07:25








 Intake & Output











 02/09/18 02/10/18 02/10/18





 18:59 06:59 18:59


 


Intake Total 200 1800 


 


Output Total 221 2770 


 


Balance -21 -970 


 


Weight (lbs) 124.738 kg 124.738 kg 


 


Intake:   


 


  Oral 200 1800 


 


Output:   


 


  Urine 220 2770 


 


  Stool 1  


 


Other:   


 


  # Bowel Movements  0 











Active Medications: 


Current Medications





Acetaminophen (Tylenol)  650 mg PO Q4HR PRN


   PRN Reason: PAIN OR FEVER >100.4


   Stop: 18 20:10


   Last Admin: 18 23:47 Dose:  650 mg


Acetaminophen/Hydrocodone Bitart (Norco 10 Mg/325 Mg)  1 tab PO Q6H PRN


   PRN Reason: body pain


   Stop: 18 16:21


   Last Admin: 18 16:13 Dose:  1 tab


Acetaminophen/Hydrocodone Bitart (Norco 10 Mg/325 Mg)  1 tab PO Q4H PRN


   PRN Reason: PAIN  


   Stop: 18 20:10


   Last Admin: 02/10/18 04:47 Dose:  1 tab


Albuterol Sulfate (Albuterol 2.5mg/3ml Neb Ud)  2.5 mg HHN Q4HRT [facility]


   Stop: 18 02:59


   Last Admin: 02/10/18 07:20 Dose:  Not Given


Aspirin (Aspirin Chewable)  81 mg PO DAILY [facility]


   Stop: 18 08:59


   Last Admin: 02/10/18 08:41 Dose:  81 mg


Benzocaine/Menthol (Cepacol)  1 josiah MM Q4H PRN


   PRN Reason: Sore Throat


Bisacodyl (Dulcolax 10 Mg Supp)  10 mg RC DAILY PRN


   PRN Reason: Constipation


   Stop: 18 20:10


Budesonide (Pulmicort)  0.5 mg HHN BIDRT [facility]


   Stop: 18 06:59


   Last Admin: 02/10/18 07:20 Dose:  Not Given


Camphor/Menthol (Bengay Greaseless 10%-15%)  1 appl TP Q4H PRN


   PRN Reason: LEFT SHOULDER AND LOWER PAIN


Carisoprodol (Soma)  350 mg PO TID [facility]


   Stop: 18 20:59


   Last Admin: 02/10/18 08:41 Dose:  350 mg


Clopidogrel Bisulfate (Plavix)  75 mg PO DAILY [facility]


   Stop: 18 08:59


   Last Admin: 02/10/18 08:41 Dose:  75 mg


Dextrose (D50w)  50 ml IVP PRN PRN


   PRN Reason: BS <70


   Stop: 18 20:10


Famotidine (Pepcid)  20 mg PO BID [facility]


   Stop: 18 08:59


   Last Admin: 02/10/18 08:41 Dose:  20 mg


Furosemide (Lasix)  40 mg IVP DAILY [facility]


   Stop: 18 08:59


   Last Admin: 02/10/18 09:00 Dose:  Not Given


Furosemide (Lasix)  40 mg PO DAILY [facility]


   Stop: 18 08:59


   Last Admin: 02/10/18 08:42 Dose:  40 mg


Gabapentin (Neurontin)  600 mg PO BID [facility]


   Stop: 18 08:59


   Last Admin: 02/10/18 08:42 Dose:  600 mg


Gemfibrozil (Lopid)  600 mg PO BID [facility]


   Stop: 18 08:59


   Last Admin: 02/10/18 08:41 Dose:  600 mg


Glucagon (Glucagen)  1 mg IM PRN PRN


   PRN Reason: IF BS <70


Guaifenesin/Codeine Phosphate (Robitussin Ac)  10 ml PO Q6HR PRN


   PRN Reason: Allergy Symptoms


   Stop: 18 19:16


Hydralazine HCl (Apresoline)  50 mg PO TID [facility]


   Stop: 18 20:59


   Last Admin: 02/10/18 08:41 Dose:  50 mg


Azithromycin 500 mg/ Sodium (Chloride)  250 mls @ 250 mls/hr IV Q24H [facility]


   Stop: 18 17:59


   Last Admin: 18 19:00 Dose:  250 mls/hr


Insulin Aspart (Novolog Insulin Sliding Scale)  0 units SUBQ Q6HR [facility]


   PRN Reason: Protocol


   Stop: 18 05:59


   Last Admin: 02/10/18 06:54 Dose:  8 units


Insulin Detemir (Levemir Insulin)  15 units SUBQ QAM [facility]


   Stop: 18 08:59


   Last Admin: 02/10/18 08:35 Dose:  15 units


Insulin Detemir (Levemir Insulin)  30 units SUBQ HS [facility]


   Stop: 18 20:59


   Last Admin: 02/10/18 01:50 Dose:  30 units


Ipratropium Bromide (Atrovent Neb 0.5mg/2.5ml)  0.5 mg HHN Q6HRT [facility]


   Stop: 18 00:59


   Last Admin: 02/10/18 07:20 Dose:  Not Given


Lactulose (Cephulac)  20 gm PO TID [facility]


   Stop: 18 20:59


   Last Admin: 02/10/18 08:44 Dose:  Not Given


Lorazepam (Ativan)  1 mg IVP Q6HR PRN; Protocol


   PRN Reason: Agitation


   Last Admin: 18 22:33 Dose:  1 mg


Losartan Potassium (Cozaar)  100 mg PO DAILY [facility]


   Stop: 18 08:59


   Last Admin: 02/10/18 08:42 Dose:  100 mg


Magnesium Hydroxide (Milk Of Magnesia)  30 ml PO DAILY PRN


   PRN Reason: Constipation


   Stop: 18 20:10


Metformin HCl (Glucophage)  850 mg PO BID [facility]


   Stop: 18 08:59


   Last Admin: 02/10/18 08:41 Dose:  850 mg


Methocarbamol (Robaxin)  500 mg PO Q6H [facility]


   Stop: 18 20:14


   Last Admin: 02/10/18 08:51 Dose:  500 mg


Morphine Sulfate (Morphine)  2 mg IVP Q4HR PRN


   PRN Reason: Pain (Moderate)


   Stop: 04/10/18 20:48


   Last Admin: 02/10/18 08:25 Dose:  2 mg


Morphine Sulfate (Morphine)  4 mg IVP Q4H PRN


   PRN Reason: Pain (Severe)


   Stop: 04/10/18 20:49


   Last Admin: 02/10/18 06:55 Dose:  4 mg


Ondansetron HCl (Zofran)  4 mg IV Q6H PRN


   PRN Reason: Nausea / Vomiting


   Stop: 04/10/18 20:50


Pantoprazole Sodium (Protonix)  40 mg PO DAILY [facility]


   Stop: 18 08:59


   Last Admin: 02/10/18 08:41 Dose:  40 mg


Sodium Phosphate (Fleet Enema)  135 ml RC DAILY PRN


   PRN Reason: IF MOM OR DULCOLAX INEFFECTIVE


   Stop: 18 20:10


Zolpidem Tartrate (Ambien)  10 mg PO 1800 PRN


   PRN Reason: Insomnia


   Last Admin: 18 20:56 Dose:  10 mg











- Procedures


Procedures: 


 Procedures











Procedure Code Date


 


EXCISION OF DUODENUM, ENDO, DIAGN 2VC83EW 17


 


EXCISION OF STOMACH, ENDO, DIAGN 0RN54VX 17














Nutritional Asmnt/Malnutr-PDOC





- Dietary Evaluation


Malnutrition Findings (Please click <Entered> for more info): 








Nutritional Asmnt/Malnutrition                             Start:  18 18:

11


Text:                                                      Status: Complete    

  


Freq:                                                                          

  


 Document     18 18:11  EDITH  (Rec: 18 18:18  EDITH [id number])


 Nutritional Asmnt/Malnutrition


     Patient General Information


      Nutritional Screening                      High Risk


      Diagnosis                                  early PNA, bronchitis


      Pertinent Medical Hx/Surgical Hx           HTN, DM, CAD, asthma/COPD, CVA


                                                 /TIA, PUD/GERD, appndectomy,


                                                 laparotomy, R tibial fracture


      Subjective Information                     Pt seen lying in bed at time


                                                 of visit, awake and alert. Pt


                                                 reported good appetite, the


                                                 food was small portion for him


                                                 .


      Current Diet Order/ Nutrition Support      low sodium


      Pertinent Medications                      lasix, novolog


      Pertinent Labs                              Cr 0.6, glucose 290, POC


                                                 276-314


                                                  A1c 10.0


     Nutritional Hx/Data


      Height                                     1.8 m


      Height (Calculated Centimeters)            180.3


      Current Weight (lbs)                       124.284 kg


      Weight (Calculated Kilograms)              124.3


      Weight (Calculated Grams)                  563822.3


      Ideal Body Weight                          166


      % Ideal Body Weight                        165


      Body Mass Index (BMI)                      38.2


      Weight Status                              Obese


     GI Symptoms


      GI Symptoms                                None


      Last BM                                    


      Difficult in:                              None


      Usual diet at home                         pt stated he did not follow


                                                 any diet restrictions. He have


                                                 candy bars, chips everyday.


                                                 BS was stayed around 300.


      Skin Integrity/Comment:                    intact


     Estimated Nutritional Goals


      BEE in Kcals:                              Adj wt of IBW


      Calories/Kcals/Kg                          25-30


      Kcals Calculated                           6842-4262


      Protein:                                   Adj wt of IBW


      Protein g/k


      Protein Calculated                         88


      Fluid: ml                                  2200-2640ml (1ml/kcal)


     Nutritional Problem


      1. Problem


       Problem                                   altered nutrition related lab


                                                 values


       Etiology                                  hx of DM, excessive


                                                 carborhydrates intake


       Signs/Symptoms:                           glucose 290, -314


     Malnutrition Alert


      Protein-Calorie Malnutrition               N/A


      Is there a minimum of two criteria         No


       selected?                                 


       Query Text:Check all the applicable       


       criteria. A minimum of two criteria are   


       recommended for diagnosis of either       


       severe or non-severe malnutrition.        


     Intervention/Recommendation


      Comments                                   1. Recommend CCHO-60gm, double


                                                 protein and veggetables for


                                                 optimal glycemic control. RN


                                                 notified.


                                                 2. Diabetes education provide.


                                                 Pt showed interest.


                                                 3. Monitor PO intake, wt, labs


                                                 and skin integrity


                                                 4. F/U as moderate risk in 3-5


                                                 days, 2/10-


     Expected Outcomes/Goals


      Expected Outcomes/Goals                    1. PO intake to meet at least


                                                 75% of nutritional needs.


                                                 2. Wt stability, skin to


                                                 remain intact, labs to


                                                 approach WNL.

## 2018-02-10 NOTE — PROGRESS NOTES
DATE:  02/10/2018



PROBLEM LIST:

1.  Acute exacerbation of chronic obstructive pulmonary disease.

2.  Sleep apnea syndrome.

3.  Underlying history of cerebrovascular accident.

4.  Recent history of fall with left femur fracture.



SYMPTOMS:  Nil except for pain periodically.  When he coughs, the pain is more. 

No respiratory distress, etc.



PHYSICAL EXAMINATION:

VITAL SIGNS:  Temperature is 98.4, blood pressure 94, saturation is 97 on 2

liters.

NECK:  Veins not visualized.

CHEST:  Shows diminished air entry.  No other adventitious breath sounds.

HEART:  Regular.



LABORATORY DATA:  CBC: White count is 9.1.  Electrolytes are okay.



ASSESSMENT:  The patient is clinically stable.  Unfortunately, he had a history

of "accidental fall" with fracture of the left hip.



PLANS AND SUGGESTIONS:  For planned surgery for hip ORIF on Monday, continue

current respiratory care, etc. and go from there.





DD: 02/10/2018 18:54

DT: 02/10/2018 23:52

JOB# [id number]  [id number]

## 2018-02-10 NOTE — DIAGNOSTIC IMAGING REPORT
Left knee (2 views), portable



HISTORY: Pain, trauma



No acute bony abnormalities.  No fractures.  Mild degenerative changes

noted.



IMPRESSION: No acute abnormalities

## 2018-02-10 NOTE — CONSULTATION
DATE OF CONSULTATION:  02/10/2018



ORTHOPEDIC SURGERY CONSULTATION



HISTORY OF PRESENT ILLNESS:  The patient is a 52-year-old gentleman admitted to

[facility] on 02/07/2018 for treatment of chest pain, coughing,

congestion and shortness of breath.



While in the hospital on 02/09/2018, he fell injuring his left hip.  X-rays were

taken that showed a fracture of the left hip.  I was called in orthopedic

consult regarding this fracture.



PAST HISTORY:  The patient has a history of right intracranial hemorrhage/CVA

resulting in left hemiparesis, diabetes, angina, GERD, hypertension and COPD.



FAMILY HISTORY AND SOCIAL HISTORY:  Unremarkable.  There is no history of drug,

tobacco or alcohol use.



PHYSICAL EXAMINATION: 

GENERAL:  The patient was examined in this hospital room at [facility].  He is complaining of pain about the left hip.

HEENT:  Normocephalic and atraumatic.

EXTREMITIES:  Upper extremities unremarkable.  Some weakness of the left arm. 

Lower extremities, right lower extremity unremarkable.  Left, there is slight

shortening and external rotation at the hip.  Movement is minimal and very

painful.  Peripheral pulses are good.  Movement of the left leg is restricted.



IMAGING STUDIES:  Reviewed x-rays in the PACS, left hip.  There is an

intertrochanteric fracture, displaced.



ORTHOPEDIC DIAGNOSES:

1.  Intertrochanteric fracture, left hip, closed, displaced.

2.  Left hemiparesis.



RECOMMENDATIONS:  The patient should have surgery -- ORIF of this fracture. 

Following medical optimization and clearance and after obtaining a full informed

consent, he can have surgery.  I have tentatively scheduled him for 02/12/2018

for the procedure -- open reduction and internal fixation of intertrochanteric

fracture, left hip.



Thank you for this interesting referral.





DD: 02/10/2018 11:55

DT: 02/10/2018 22:32

JOB# [id number]  [id number]

## 2018-02-11 LAB
ALBUMIN SERPL-MCNC: 4.2 GM/DL (ref 4.2–5.5)
ALBUMIN/GLOB SERPL: 1.4 {RATIO} (ref 1–1.8)
ALP SERPL-CCNC: 55 U/L (ref 34–104)
ALT SERPL-CCNC: 31 U/L (ref 7–52)
ANION GAP SERPL CALC-SCNC: 13.3 MMOL/L (ref 7–16)
AST SERPL-CCNC: 18 U/L (ref 13–39)
BASOPHILS # BLD AUTO: 0.1 TH/CUMM (ref 0–0.2)
BASOPHILS NFR BLD AUTO: 0.7 % (ref 0–2)
BILIRUB SERPL-MCNC: 0.5 MG/DL (ref 0.3–1)
BUN SERPL-MCNC: 19 MG/DL (ref 7–25)
CALCIUM SERPL-MCNC: 9.3 MG/DL (ref 8.6–10.3)
CHLORIDE SERPL-SCNC: 101 MEQ/L (ref 98–107)
CO2 SERPL-SCNC: 25.3 MEQ/L (ref 21–31)
CREAT SERPL-MCNC: 0.7 MG/DL (ref 0.7–1.3)
EOSINOPHIL # BLD AUTO: 0.1 TH/CMM (ref 0.1–0.4)
EOSINOPHIL NFR BLD AUTO: 1.4 % (ref 0–5)
ERYTHROCYTE [DISTWIDTH] IN BLOOD BY AUTOMATED COUNT: 13.1 % (ref 11.5–20)
GLOBULIN SER-MCNC: 3 GM/DL
GLUCOSE SERPL-MCNC: 250 MG/DL (ref 70–105)
HCT VFR BLD CALC: 38.1 % (ref 41–60)
HGB BLD-MCNC: 13 GM/DL (ref 12–16)
INR PPP: 0.94 (ref 0.5–1.4)
LYMPHOCYTE AB SER FC-ACNC: 1.7 TH/CMM (ref 1.5–3)
LYMPHOCYTES NFR BLD AUTO: 17.3 % (ref 20–50)
MCH RBC QN AUTO: 29.4 PG (ref 26–30)
MCHC RBC AUTO-ENTMCNC: 34.1 PG (ref 28–36)
MCV RBC AUTO: 86.3 FL (ref 80–99)
MONOCYTES # BLD AUTO: 1.2 TH/CMM (ref 0.3–1)
MONOCYTES NFR BLD AUTO: 12.6 % (ref 2–10)
NEUTROPHILS # BLD: 6.8 TH/CMM (ref 1.8–8)
NEUTROPHILS NFR BLD AUTO: 68 % (ref 40–80)
PCO2 BLDA: 43 MMHG (ref 35–45)
PLATELET # BLD: 290 TH/CMM (ref 150–400)
PMV BLD AUTO: 7.5 FL
PO2 BLDA: 60 MMHG (ref 80–100)
POTASSIUM SERPL-SCNC: 3.6 MEQ/L (ref 3.5–5.1)
PROTHROMBIN TIME: 9.8 SECONDS (ref 9.5–11.5)
RBC # BLD AUTO: 4.42 MIL/CMM (ref 4.3–5.7)
SAO2 % BLDA: 91 % (ref 92–100)
SODIUM SERPL-SCNC: 136 MEQ/L (ref 136–145)
WBC # BLD AUTO: 9.9 TH/CMM (ref 4.8–10.8)

## 2018-02-11 RX ADMIN — LACTULOSE SCH: 20 SOLUTION ORAL at 22:54

## 2018-02-11 RX ADMIN — ALBUTEROL SULFATE SCH MG: 2.5 SOLUTION RESPIRATORY (INHALATION) at 08:01

## 2018-02-11 RX ADMIN — IPRATROPIUM BROMIDE SCH MG: 0.5 SOLUTION RESPIRATORY (INHALATION) at 12:08

## 2018-02-11 RX ADMIN — INSULIN ASPART SCH UNITS: 100 INJECTION, SOLUTION INTRAVENOUS; SUBCUTANEOUS at 23:56

## 2018-02-11 RX ADMIN — MORPHINE SULFATE PRN MG: 4 INJECTION, SOLUTION INTRAMUSCULAR; INTRAVENOUS at 16:49

## 2018-02-11 RX ADMIN — IPRATROPIUM BROMIDE SCH MG: 0.5 SOLUTION RESPIRATORY (INHALATION) at 18:42

## 2018-02-11 RX ADMIN — INSULIN ASPART SCH: 100 INJECTION, SOLUTION INTRAVENOUS; SUBCUTANEOUS at 06:30

## 2018-02-11 RX ADMIN — PANTOPRAZOLE SODIUM SCH MG: 40 TABLET, DELAYED RELEASE ORAL at 09:16

## 2018-02-11 RX ADMIN — MORPHINE SULFATE PRN MG: 2 INJECTION, SOLUTION INTRAMUSCULAR; INTRAVENOUS at 03:08

## 2018-02-11 RX ADMIN — ASPIRIN 81 MG SCH MG: 81 TABLET ORAL at 09:12

## 2018-02-11 RX ADMIN — INSULIN DETEMIR SCH UNITS: 100 INJECTION, SOLUTION SUBCUTANEOUS at 09:17

## 2018-02-11 RX ADMIN — ALBUTEROL SULFATE SCH MG: 2.5 SOLUTION RESPIRATORY (INHALATION) at 15:30

## 2018-02-11 RX ADMIN — INSULIN ASPART SCH UNITS: 100 INJECTION, SOLUTION INTRAVENOUS; SUBCUTANEOUS at 00:06

## 2018-02-11 RX ADMIN — BUDESONIDE SCH MG: 0.5 SUSPENSION RESPIRATORY (INHALATION) at 08:01

## 2018-02-11 RX ADMIN — ALBUTEROL SULFATE SCH MG: 2.5 SOLUTION RESPIRATORY (INHALATION) at 18:42

## 2018-02-11 RX ADMIN — ALBUTEROL SULFATE SCH MG: 2.5 SOLUTION RESPIRATORY (INHALATION) at 12:08

## 2018-02-11 RX ADMIN — HYDROCODONE BITATRATE AND ACETAMINOPHEN PRN TAB: 10; 325 TABLET ORAL at 20:43

## 2018-02-11 RX ADMIN — BUDESONIDE SCH MG: 0.5 SUSPENSION RESPIRATORY (INHALATION) at 18:43

## 2018-02-11 RX ADMIN — INSULIN DETEMIR SCH UNITS: 100 INJECTION, SOLUTION SUBCUTANEOUS at 20:41

## 2018-02-11 RX ADMIN — IPRATROPIUM BROMIDE SCH MG: 0.5 SOLUTION RESPIRATORY (INHALATION) at 08:01

## 2018-02-11 RX ADMIN — INSULIN ASPART SCH UNITS: 100 INJECTION, SOLUTION INTRAVENOUS; SUBCUTANEOUS at 17:45

## 2018-02-11 RX ADMIN — MORPHINE SULFATE PRN MG: 4 INJECTION, SOLUTION INTRAMUSCULAR; INTRAVENOUS at 22:55

## 2018-02-11 RX ADMIN — ALBUTEROL SULFATE SCH: 2.5 SOLUTION RESPIRATORY (INHALATION) at 02:58

## 2018-02-11 RX ADMIN — IPRATROPIUM BROMIDE SCH: 0.5 SOLUTION RESPIRATORY (INHALATION) at 00:53

## 2018-02-11 RX ADMIN — LACTULOSE SCH: 20 SOLUTION ORAL at 13:44

## 2018-02-11 RX ADMIN — INSULIN ASPART SCH UNITS: 100 INJECTION, SOLUTION INTRAVENOUS; SUBCUTANEOUS at 12:08

## 2018-02-11 RX ADMIN — MORPHINE SULFATE PRN MG: 2 INJECTION, SOLUTION INTRAMUSCULAR; INTRAVENOUS at 19:40

## 2018-02-11 RX ADMIN — ALBUTEROL SULFATE SCH: 2.5 SOLUTION RESPIRATORY (INHALATION) at 23:00

## 2018-02-11 RX ADMIN — MORPHINE SULFATE PRN MG: 4 INJECTION, SOLUTION INTRAMUSCULAR; INTRAVENOUS at 10:58

## 2018-02-11 RX ADMIN — LACTULOSE SCH: 20 SOLUTION ORAL at 09:23

## 2018-02-11 NOTE — GENERAL PROGRESS NOTE
Subjective





- Review of Systems


Events since last encounter: 





in no distress


with cough 





Objective





- Results


Result Diagrams: 


 18 05:59





 18 05:59


Recent Labs: 


 Laboratory Last Values











WBC  9.9 Th/cmm (4.8-10.8)   18  05:59    


 


RBC  4.42 Mil/cmm (4.30-5.70)   18  05:59    


 


Hgb  13.0 gm/dL (12-16)   18  05:59    


 


Hct  38.1 % (41.0-60)  L  18  05:59    


 


MCV  86.3 fl (80-99)   18  05:59    


 


MCH  29.4 pg (26.0-30.0)   18  05:59    


 


MCHC Differential  34.1 pg (28.0-36.0)   18  05:59    


 


RDW  13.1 % (11.5-20.0)   18  05:59    


 


Plt Count  290 Th/cmm (150-400)   18  05:59    


 


MPV  7.5 fl  18  05:59    


 


Neutrophils %  68.0 % (40.0-80.0)   18  05:59    


 


Lymphocytes %  17.3 % (20.0-50.0)  L  18  05:59    


 


Monocytes %  12.6 % (2.0-10.0)  H  18  05:59    


 


Eosinophils %  1.4 % (0.0-5.0)   18  05:59    


 


Basophils %  0.7 % (0.0-2.0)   18  05:59    


 


PT  9.8 SECONDS (9.5-11.5)   18  09:18    


 


INR  0.94  (0.5-1.4)   18  09:18    


 


PTT (Actin FS)  26.1 SECONDS (26.0-38.0)   18  09:18    


 


Specimen Source  Arterial   18  09:15    


 


Sample Site  Right Radial   18  09:15    


 


pH  7.41  (7.35-7.45)   18  09:15    


 


pCO2  42.0 mmHg (35.0-45.0)   18  09:15    


 


pO2  72.0 mmHg (80.0-100.0)  L  18  09:15    


 


HCO3  26.2 mEq/L (20.0-26.0)  H  18  09:15    


 


Base Excess  1.7 mEq/L (-3.0-3.0)   18  09:15    


 


O2 Saturation  94.0 % (92.0-100.0)   18  09:15    


 


Glne Test  YES   18  09:15    


 


Vent Rate  NA   18  09:15    


 


Inspired O2  21   18  09:15    


 


Tidal Volume  NA   18  09:15    


 


PEEP  NA   18  09:15    


 


Pressure (ins/psv/peep)  NA   18  09:15    


 


Critical Value  E.VAZQUEZ   18  09:15    


 


Sodium  136 mEq/L (136-145)   18  05:59    


 


Potassium  3.6 mEq/L (3.5-5.1)   18  05:59    


 


Chloride  101 mEq/L ()   18  05:59    


 


Carbon Dioxide  25.3 mEq/L (21.0-31.0)   18  05:59    


 


Anion Gap  13.3  (7.0-16.0)   18  05:59    


 


BUN  19 mg/dL (7-25)   18  05:59    


 


Creatinine  0.7 mg/dL (0.7-1.3)   18  05:59    


 


Est GFR ( Amer)  > 60.0 ml/min (>90)   18  05:59    


 


Est GFR (Non-Af Amer)  > 60.0 ml/min  18  05:59    


 


BUN/Creatinine Ratio  27.1   18  05:59    


 


Glucose  250 mg/dL ()  H  18  05:59    


 


POC Glucose  287 MG/DL (70 - 105)  H  18  05:53    


 


Hemoglobin A1c %  10.0 % (4.0-6.0)  H  18  20:45    


 


Calcium  9.3 mg/dL (8.6-10.3)   18  05:59    


 


Total Bilirubin  0.5 mg/dL (0.3-1.0)   18  05:59    


 


Direct Bilirubin  0.04 mg/dL (0.0-0.2)   18  06:45    


 


AST  18 U/L (13-39)   18  05:59    


 


ALT  31 U/L (7-52)   18  05:59    


 


Alkaline Phosphatase  55 U/L ()   18  05:59    


 


Ammonia  65 umol/L (16-53)  H  18  06:45    


 


Total Protein  7.2 gm/dL (6.0-8.3)   18  05:59    


 


Albumin  4.2 gm/dL (4.2-5.5)   18  05:59    


 


Globulin  3.0 gm/dL  18  05:59    


 


Albumin/Globulin Ratio  1.4  (1.0-1.8)   18  05:59    


 


Free T3  2.7 pg/mL (2.0-4.4)   18  06:45    














- Physical Exam


Vitals and I&O: 


 Vital Signs











Temp  98.2 F   18 04:00


 


Pulse  100   18 09:17


 


Resp  20   18 04:00


 


BP  144/79   18 09:17


 


Pulse Ox  94   18 04:00








 Intake & Output











 02/10/18 02/11/18 02/11/18





 18:59 06:59 18:59


 


Intake Total  590 


 


Balance  590 


 


Weight (lbs)  124.738 kg 


 


Intake:   


 


  Intake, IV Amount  250 


 


    Azithromycin 500 mg In  250 





    Sodium Chloride 0.9% 250   





    ml @ 250 mls/hr IV Q24H   





    [facility] Rx#:[id number]   


 


  Oral  340 


 


Other:   


 


  # Voids  900 


 


  # Bowel Movements  3 


 


  Stool Characteristics  Soft 





  Brown 











Active Medications: 


Current Medications





Acetaminophen (Tylenol)  650 mg PO Q4HR PRN


   PRN Reason: PAIN OR FEVER >100.4


   Stop: 18 20:10


   Last Admin: 18 23:47 Dose:  650 mg


Acetaminophen/Hydrocodone Bitart (Norco 10 Mg/325 Mg)  1 tab PO Q6H PRN


   PRN Reason: body pain


   Stop: 18 16:21


   Last Admin: 02/10/18 22:35 Dose:  1 tab


Acetaminophen/Hydrocodone Bitart (Norco 10 Mg/325 Mg)  1 tab PO Q4H PRN


   PRN Reason: PAIN  


   Stop: 18 20:10


   Last Admin: 02/10/18 04:47 Dose:  1 tab


Albuterol Sulfate (Albuterol 2.5mg/3ml Neb Ud)  2.5 mg HHN Q4HRT [facility]


   Stop: 18 02:59


   Last Admin: 18 08:01 Dose:  2.5 mg


Aspirin (Aspirin Chewable)  81 mg PO DAILY [facility]


   Stop: 18 08:59


   Last Admin: 18 09:12 Dose:  81 mg


Benzocaine/Menthol (Cepacol)  1 josiah MM Q4H PRN


   PRN Reason: Sore Throat


Bisacodyl (Dulcolax 10 Mg Supp)  10 mg RC DAILY PRN


   PRN Reason: Constipation


   Stop: 18 20:10


Budesonide (Pulmicort)  0.5 mg HHN BIDRT [facility]


   Stop: 18 06:59


   Last Admin: 18 08:01 Dose:  0.5 mg


Camphor/Menthol (Bengay Greaseless 10%-15%)  1 appl TP Q4H PRN


   PRN Reason: LEFT SHOULDER AND LOWER PAIN


Carisoprodol (Soma)  350 mg PO TID [facility]


   Stop: 18 20:59


   Last Admin: 18 09:12 Dose:  350 mg


Clopidogrel Bisulfate (Plavix)  75 mg PO DAILY [facility]


   Stop: 18 08:59


   Last Admin: 18 09:15 Dose:  Not Given


Dextrose (D50w)  50 ml IVP PRN PRN


   PRN Reason: BS <70


   Stop: 18 20:10


Famotidine (Pepcid)  20 mg PO BID [facility]


   Stop: 18 08:59


   Last Admin: 18 09:14 Dose:  20 mg


Furosemide (Lasix)  40 mg PO DAILY [facility]


   Stop: 18 08:59


   Last Admin: 18 09:12 Dose:  40 mg


Gabapentin (Neurontin)  600 mg PO BID [facility]


   Stop: 18 08:59


   Last Admin: 18 09:12 Dose:  600 mg


Gemfibrozil (Lopid)  600 mg PO BID [facility]


   Stop: 18 08:59


   Last Admin: 18 09:16 Dose:  600 mg


Glucagon (Glucagen)  1 mg IM PRN PRN


   PRN Reason: IF BS <70


Guaifenesin/Codeine Phosphate (Robitussin Ac)  10 ml PO Q6HR PRN


   PRN Reason: Allergy Symptoms


   Stop: 18 19:16


Hydralazine HCl (Apresoline)  50 mg PO TID [facility]


   Stop: 18 20:59


   Last Admin: 18 09:16 Dose:  50 mg


Azithromycin 500 mg/ Sodium (Chloride)  250 mls @ 250 mls/hr IV Q24H [facility]


   Stop: 18 17:59


   Last Infusion: 02/10/18 20:49 Dose:  Infused


Dextrose/Sodium Chloride (D5-0.45ns)  1,000 mls @ 50 mls/hr IV .Q20H [facility]


   Stop: 18 23:59


Insulin Aspart (Novolog Insulin Sliding Scale)  0 units SUBQ Q6HR [facility]


   PRN Reason: Protocol


   Stop: 18 05:59


   Last Admin: 18 00:06 Dose:  6 units


Insulin Detemir (Levemir Insulin)  15 units SUBQ QAM [facility]


   Stop: 18 08:59


   Last Admin: 18 09:17 Dose:  15 units


Insulin Detemir (Levemir Insulin)  30 units SUBQ HS [facility]


   Stop: 18 20:59


   Last Admin: 02/10/18 20:38 Dose:  30 units


Ipratropium Bromide (Atrovent Neb 0.5mg/2.5ml)  0.5 mg HHN Q6HRT [facility]


   Stop: 18 00:59


   Last Admin: 18 08:01 Dose:  0.5 mg


Lactulose (Cephulac)  20 gm PO TID [facility]


   Stop: 18 20:59


   Last Admin: 18 09:23 Dose:  Not Given


Lorazepam (Ativan)  1 mg IVP Q6HR PRN; Protocol


   PRN Reason: Agitation


   Last Admin: 02/10/18 16:37 Dose:  1 mg


Losartan Potassium (Cozaar)  100 mg PO DAILY [facility]


   Stop: 18 08:59


   Last Admin: 18 09:17 Dose:  100 mg


Magnesium Hydroxide (Milk Of Magnesia)  30 ml PO DAILY PRN


   PRN Reason: Constipation


   Stop: 18 20:10


Metformin HCl (Glucophage)  850 mg PO BID [facility]


   Stop: 18 08:59


   Last Admin: 18 09:17 Dose:  850 mg


Methocarbamol (Robaxin)  500 mg PO Q6H [facility]


   Stop: 18 20:14


   Last Admin: 18 03:11 Dose:  500 mg


Morphine Sulfate (Morphine)  2 mg IVP Q4HR PRN


   PRN Reason: Pain (Moderate)


   Stop: 04/10/18 20:48


   Last Admin: 18 03:08 Dose:  2 mg


Morphine Sulfate (Morphine)  4 mg IVP Q4H PRN


   PRN Reason: Pain (Severe)


   Stop: 04/10/18 20:49


   Last Admin: 02/10/18 23:21 Dose:  4 mg


Ondansetron HCl (Zofran)  4 mg IV Q6H PRN


   PRN Reason: Nausea / Vomiting


   Stop: 04/10/18 20:50


Pantoprazole Sodium (Protonix)  40 mg PO DAILY [facility]


   Stop: 18 08:59


   Last Admin: 18 09:16 Dose:  40 mg


Sodium Phosphate (Fleet Enema)  135 ml RC DAILY PRN


   PRN Reason: IF MOM OR DULCOLAX INEFFECTIVE


   Stop: 18 20:10


Zolpidem Tartrate (Ambien)  10 mg PO 1800 PRN


   PRN Reason: Insomnia


   Last Admin: 18 00:18 Dose:  10 mg











- Procedures


Procedures: 


 Procedures











Procedure Code Date


 


EXCISION OF DUODENUM, ENDO, DIAGN 9GW11XX 17


 


EXCISION OF STOMACH, ENDO, DIAGN [id number] 17














Assessment/Plan





- Assessment


Assessment: 





Early pna


acute bronchitis


generalized weakness


congestion





HTN


 DM


 CAD


 Asthma/COPD


hx CVA/TIA w/ left side weakness





- Plan


Plan: 





cardiology consultation


ivabx


accucheck with sliding scale 


contnue current orders 





Nutritional Asmnt/Malnutr-PDOC





- Dietary Evaluation


Malnutrition Findings (Please click <Entered> for more info): 








Nutritional Asmnt/Malnutrition                             Start:  18 18:

11


Text:                                                      Status: Complete    

  


Freq:                                                                          

  


 Document     18 18:11  LCLUCRETIAG  (Rec: 18 18:18  [id number]  [id number])


 Nutritional Asmnt/Malnutrition


     Patient General Information


      Nutritional Screening                      High Risk


      Diagnosis                                  early PNA, bronchitis


      Pertinent Medical Hx/Surgical Hx           HTN, DM, CAD, asthma/COPD, CVA


                                                 /TIA, PUD/GERD, appndectomy,


                                                 laparotomy, R tibial fracture


      Subjective Information                     Pt seen lying in bed at time


                                                 of visit, awake and alert. Pt


                                                 reported good appetite, the


                                                 food was small portion for him


                                                 .


      Current Diet Order/ Nutrition Support      low sodium


      Pertinent Medications                      lasix, novolog


      Pertinent Labs                              Cr 0.6, glucose 290, POC


                                                 276-314


                                                  A1c 10.0


     Nutritional Hx/Data


      Height                                     1.8 m


      Height (Calculated Centimeters)            180.3


      Current Weight (lbs)                       124.284 kg


      Weight (Calculated Kilograms)              124.3


      Weight (Calculated Grams)                  035952.3


      Ideal Body Weight                          166


      % Ideal Body Weight                        165


      Body Mass Index (BMI)                      38.2


      Weight Status                              Obese


     GI Symptoms


      GI Symptoms                                None


      Last BM                                    


      Difficult in:                              None


      Usual diet at home                         pt stated he did not follow


                                                 any diet restrictions. He have


                                                 candy bars, chips everyday.


                                                 BS was stayed around 300.


      Skin Integrity/Comment:                    intact


     Estimated Nutritional Goals


      BEE in Kcals:                              Adj wt of IBW


      Calories/Kcals/Kg                          25-30


      Kcals Calculated                           8461-5086


      Protein:                                   Adj wt of IBW


      Protein g/k


      Protein Calculated                         88


      Fluid: ml                                  2200-2640ml (1ml/kcal)


     Nutritional Problem


      1. Problem


       Problem                                   altered nutrition related lab


                                                 values


       Etiology                                  hx of DM, excessive


                                                 carborhydrates intake


       Signs/Symptoms:                           glucose 290, -314


     Malnutrition Alert


      Protein-Calorie Malnutrition               N/A


      Is there a minimum of two criteria         No


       selected?                                 


       Query Text:Check all the applicable       


       criteria. A minimum of two criteria are   


       recommended for diagnosis of either       


       severe or non-severe malnutrition.        


     Intervention/Recommendation


      Comments                                   1. Recommend CCHO-60gm, double


                                                 protein and veggetables for


                                                 optimal glycemic control. RN


                                                 notified.


                                                 2. Diabetes education provide.


                                                 Pt showed interest.


                                                 3. Monitor PO intake, wt, labs


                                                 and skin integrity


                                                 4. F/U as moderate risk in 3-5


                                                 days, 2/10-


     Expected Outcomes/Goals


      Expected Outcomes/Goals                    1. PO intake to meet at least


                                                 75% of nutritional needs.


                                                 2. Wt stability, skin to


                                                 remain intact, labs to


                                                 approach WNL.

## 2018-02-11 NOTE — DIAGNOSTIC IMAGING REPORT
Portable chest x-ray



HISTORY: Cough, preoperative



The heart appears enlarged.  No focal pulmonary processes.  Elevation of

the right hemidiaphragm unchanged from February 8, 2018.  No hilar or

mediastinal abnormalities.



IMPRESSION:

1.  No acute pulmonary processes

2.  Cardiomegaly

## 2018-02-11 NOTE — PROGRESS NOTES
DATE:  02/11/2018



PROBLEM LIST:

1.  Acute exacerbation of chronic obstructive pulmonary disease.

2.  Recent fracture, left hip traumatic fall "accident."

3.  Severe obstructive sleep apnea syndrome.

4.  History of cerebrovascular accident.



SYMPTOMS:  Nil.  He is going to have surgery, left hip tomorrow.  Coughing is

less.  Denies feeling much of shortness of breath, etc.



PHYSICAL EXAMINATION:

VITAL SIGNS:  The patient's recorded vitals:  Temperature is 98.0, blood

pressure 158/82, saturation is 95% on room air.

NECK:  Veins not visualized.

CHEST:  Shows diminished air entry with occasional rhonchi.

HEART:  Regular.

ABDOMEN:  Soft, nontender.

EXTREMITIES:  Shows no peripheral edema.



LABORATORY DATA:  White count is 9.9 and pO2 of 60 on room air.  Electrolytes

are okay.



IMAGING STUDIES:  Chest x-ray shows no acute pathology.



ASSESSMENT:

1.  The patient has chronic obstructive pulmonary disease with exacerbation,

almost back to baseline.

2.  Underlying suspect of obstructive sleep apnea syndrome.

3.  History of cerebrovascular accident.

4.  Recent iatrogenic acquired left hip fracture.



PLANS AND SUGGESTIONS:  Continue current respiratory therapy, inhalation

treatment, etc., should be okay for him to undergo surgery and will be monitored

very closely perioperatively and will go from there.





DD: 02/11/2018 18:55

DT: 02/11/2018 20:48

JOB# [id number]  [id number]

## 2018-02-12 LAB
ANION GAP SERPL CALC-SCNC: 15.1 MMOL/L (ref 7–16)
APPEARANCE UR: CLEAR
BACTERIA #/AREA URNS HPF: (no result) /HPF
BASOPHILS # BLD AUTO: 0.2 TH/CUMM (ref 0–0.2)
BASOPHILS NFR BLD AUTO: 1.7 % (ref 0–2)
BILIRUB UR-MCNC: NEGATIVE MG/DL
BUN SERPL-MCNC: 19 MG/DL (ref 7–25)
CALCIUM SERPL-MCNC: 9.2 MG/DL (ref 8.6–10.3)
CHLORIDE SERPL-SCNC: 100 MEQ/L (ref 98–107)
CO2 SERPL-SCNC: 21.4 MEQ/L (ref 21–31)
COLOR UR: (no result)
CREAT SERPL-MCNC: 0.9 MG/DL (ref 0.7–1.3)
EOSINOPHIL # BLD AUTO: 0.1 TH/CMM (ref 0.1–0.4)
EOSINOPHIL NFR BLD AUTO: 1 % (ref 0–5)
EPI CELLS URNS QL MICRO: (no result) /LPF
ERYTHROCYTE [DISTWIDTH] IN BLOOD BY AUTOMATED COUNT: 13 % (ref 11.5–20)
GLUCOSE SERPL-MCNC: 239 MG/DL (ref 70–105)
GLUCOSE UR STRIP-MCNC: 100 MG/DL
HCT VFR BLD CALC: 37.9 % (ref 41–60)
HGB BLD-MCNC: 13 GM/DL (ref 12–16)
HYALINE CASTS #/AREA URNS LPF: (no result) /LPF (ref 0–2)
KETONES UR STRIP-MCNC: NEGATIVE MG/DL
LEUKOCYTE ESTERASE UR-ACNC: NEGATIVE
LYMPHOCYTE AB SER FC-ACNC: 1.9 TH/CMM (ref 1.5–3)
LYMPHOCYTES NFR BLD AUTO: 17.2 % (ref 20–50)
MCH RBC QN AUTO: 29 PG (ref 26–30)
MCHC RBC AUTO-ENTMCNC: 34.2 PG (ref 28–36)
MCV RBC AUTO: 84.9 FL (ref 80–99)
MICRO URNS: YES
MONOCYTES # BLD AUTO: 1.1 TH/CMM (ref 0.3–1)
MONOCYTES NFR BLD AUTO: 10.4 % (ref 2–10)
NEUTROPHILS # BLD: 7.7 TH/CMM (ref 1.8–8)
NEUTROPHILS NFR BLD AUTO: 69.7 % (ref 40–80)
NITRITE UR QL STRIP: NEGATIVE
PH UR STRIP: 6 [PH] (ref 4.6–8)
PLATELET # BLD: 332 TH/CMM (ref 150–400)
PMV BLD AUTO: 7.8 FL
POTASSIUM SERPL-SCNC: 3.5 MEQ/L (ref 3.5–5.1)
PROT UR STRIP-MCNC: 30 MG/DL
RBC # BLD AUTO: 4.46 MIL/CMM (ref 4.3–5.7)
RBC # UR STRIP: NEGATIVE /UL
RBC #/AREA URNS HPF: (no result) /HPF (ref 0–5)
SODIUM SERPL-SCNC: 133 MEQ/L (ref 136–145)
SP GR UR STRIP: >= 1.03 (ref 1–1.03)
URINALYSIS COMPLETE PNL UR: (no result)
UROBILINOGEN UR STRIP-ACNC: 0.2 E.U./DL (ref 0.2–1)
WBC # BLD AUTO: 11 TH/CMM (ref 4.8–10.8)
WBC #/AREA URNS HPF: (no result) /HPF (ref 0–5)

## 2018-02-12 PROCEDURE — 0QS704Z REPOSITION LEFT UPPER FEMUR WITH INTERNAL FIXATION DEVICE, OPEN APPROACH: ICD-10-PCS

## 2018-02-12 RX ADMIN — ASPIRIN 81 MG SCH: 81 TABLET ORAL at 10:51

## 2018-02-12 RX ADMIN — LACTULOSE SCH: 20 SOLUTION ORAL at 22:59

## 2018-02-12 RX ADMIN — MORPHINE SULFATE PRN MG: 2 INJECTION, SOLUTION INTRAMUSCULAR; INTRAVENOUS at 10:47

## 2018-02-12 RX ADMIN — MORPHINE SULFATE PRN MG: 2 INJECTION, SOLUTION INTRAMUSCULAR; INTRAVENOUS at 01:47

## 2018-02-12 RX ADMIN — ALBUTEROL SULFATE SCH MG: 2.5 SOLUTION RESPIRATORY (INHALATION) at 19:08

## 2018-02-12 RX ADMIN — INSULIN DETEMIR SCH UNITS: 100 INJECTION, SOLUTION SUBCUTANEOUS at 22:19

## 2018-02-12 RX ADMIN — ALBUTEROL SULFATE SCH MG: 2.5 SOLUTION RESPIRATORY (INHALATION) at 11:04

## 2018-02-12 RX ADMIN — MEPERIDINE HYDROCHLORIDE PRN MG: 25 INJECTION, SOLUTION INTRAMUSCULAR; INTRAVENOUS; SUBCUTANEOUS at 18:41

## 2018-02-12 RX ADMIN — IPRATROPIUM BROMIDE SCH MG: 0.5 SOLUTION RESPIRATORY (INHALATION) at 06:49

## 2018-02-12 RX ADMIN — BUDESONIDE SCH MG: 0.5 SUSPENSION RESPIRATORY (INHALATION) at 19:09

## 2018-02-12 RX ADMIN — IPRATROPIUM BROMIDE SCH MG: 0.5 SOLUTION RESPIRATORY (INHALATION) at 19:08

## 2018-02-12 RX ADMIN — MORPHINE SULFATE PRN MG: 2 INJECTION, SOLUTION INTRAMUSCULAR; INTRAVENOUS at 06:42

## 2018-02-12 RX ADMIN — LACTULOSE SCH: 20 SOLUTION ORAL at 15:03

## 2018-02-12 RX ADMIN — INSULIN ASPART SCH: 100 INJECTION, SOLUTION INTRAVENOUS; SUBCUTANEOUS at 13:48

## 2018-02-12 RX ADMIN — PANTOPRAZOLE SODIUM SCH: 40 TABLET, DELAYED RELEASE ORAL at 10:49

## 2018-02-12 RX ADMIN — MEPERIDINE HYDROCHLORIDE PRN MG: 25 INJECTION, SOLUTION INTRAMUSCULAR; INTRAVENOUS; SUBCUTANEOUS at 18:48

## 2018-02-12 RX ADMIN — ALBUTEROL SULFATE SCH: 2.5 SOLUTION RESPIRATORY (INHALATION) at 03:46

## 2018-02-12 RX ADMIN — IPRATROPIUM BROMIDE SCH: 0.5 SOLUTION RESPIRATORY (INHALATION) at 01:00

## 2018-02-12 RX ADMIN — MORPHINE SULFATE PRN MG: 4 INJECTION, SOLUTION INTRAMUSCULAR; INTRAVENOUS at 22:25

## 2018-02-12 RX ADMIN — ALBUTEROL SULFATE SCH MG: 2.5 SOLUTION RESPIRATORY (INHALATION) at 15:17

## 2018-02-12 RX ADMIN — ALBUTEROL SULFATE SCH MG: 2.5 SOLUTION RESPIRATORY (INHALATION) at 06:49

## 2018-02-12 RX ADMIN — IPRATROPIUM BROMIDE SCH MG: 0.5 SOLUTION RESPIRATORY (INHALATION) at 13:10

## 2018-02-12 RX ADMIN — INSULIN ASPART SCH: 100 INJECTION, SOLUTION INTRAVENOUS; SUBCUTANEOUS at 07:34

## 2018-02-12 RX ADMIN — MEPERIDINE HYDROCHLORIDE PRN MG: 25 INJECTION, SOLUTION INTRAMUSCULAR; INTRAVENOUS; SUBCUTANEOUS at 18:52

## 2018-02-12 RX ADMIN — MEPERIDINE HYDROCHLORIDE PRN MG: 25 INJECTION, SOLUTION INTRAMUSCULAR; INTRAVENOUS; SUBCUTANEOUS at 18:57

## 2018-02-12 RX ADMIN — MORPHINE SULFATE PRN MG: 4 INJECTION, SOLUTION INTRAMUSCULAR; INTRAVENOUS at 03:32

## 2018-02-12 RX ADMIN — INSULIN DETEMIR SCH: 100 INJECTION, SOLUTION SUBCUTANEOUS at 10:50

## 2018-02-12 RX ADMIN — BUDESONIDE SCH MG: 0.5 SUSPENSION RESPIRATORY (INHALATION) at 06:48

## 2018-02-12 RX ADMIN — ALBUTEROL SULFATE SCH: 2.5 SOLUTION RESPIRATORY (INHALATION) at 22:30

## 2018-02-12 RX ADMIN — LACTULOSE SCH: 20 SOLUTION ORAL at 10:50

## 2018-02-12 RX ADMIN — MORPHINE SULFATE PRN MG: 4 INJECTION, SOLUTION INTRAMUSCULAR; INTRAVENOUS at 13:50

## 2018-02-12 RX ADMIN — ALBUTEROL SULFATE SCH: 2.5 SOLUTION RESPIRATORY (INHALATION) at 15:18

## 2018-02-12 NOTE — GENERAL PROGRESS NOTE
Subjective





- Review of Systems


Events since last encounter: 





no distress





Objective





- Results


Result Diagrams: 


 18 01:00





 18 01:00


Recent Labs: 


 Laboratory Last Values











WBC  11.0 Th/cmm (4.8-10.8)  H  18  01:00    


 


RBC  4.46 Mil/cmm (4.30-5.70)   18  01:00    


 


Hgb  13.0 gm/dL (12-16)   18  01:00    


 


Hct  37.9 % (41.0-60)  L  18  01:00    


 


MCV  84.9 fl (80-99)   18  01:00    


 


MCH  29.0 pg (26.0-30.0)   18  01:00    


 


MCHC Differential  34.2 pg (28.0-36.0)   18  01:00    


 


RDW  13.0 % (11.5-20.0)   18  01:00    


 


Plt Count  332 Th/cmm (150-400)   18  01:00    


 


MPV  7.8 fl  18  01:00    


 


Neutrophils %  69.7 % (40.0-80.0)   18  01:00    


 


Lymphocytes %  17.2 % (20.0-50.0)  L  18  01:00    


 


Monocytes %  10.4 % (2.0-10.0)  H  18  01:00    


 


Eosinophils %  1.0 % (0.0-5.0)   18  01:00    


 


Basophils %  1.7 % (0.0-2.0)   18  01:00    


 


PT  9.8 SECONDS (9.5-11.5)   18  09:18    


 


INR  0.94  (0.5-1.4)   18  09:18    


 


PTT (Actin FS)  26.1 SECONDS (26.0-38.0)   18  09:18    


 


Specimen Source  Arterial   18  10:54    


 


Sample Site  Right Radial   18  10:54    


 


pH  7.41  (7.35-7.45)   18  10:54    


 


pCO2  43.0 mmHg (35.0-45.0)   18  10:54    


 


pO2  60.0 mmHg (80.0-100.0)  L  18  10:54    


 


HCO3  26.6 mEq/L (20.0-26.0)  H  18  10:54    


 


Base Excess  2.3 mEq/L (-3.0-3.0)   18  10:54    


 


O2 Saturation  91.0 % (92.0-100.0)  L  18  10:54    


 


Glen Test  Positive   18  10:54    


 


Vent Rate  NA   18  10:54    


 


Inspired O2  21   18  10:54    


 


Tidal Volume  NA   18  10:54    


 


PEEP  NA   18  10:54    


 


Pressure (ins/psv/peep)  NA   18  10:54    


 


Critical Value  LZHANG   18  10:54    


 


Sodium  133 mEq/L (136-145)  L  18  01:00    


 


Potassium  3.5 mEq/L (3.5-5.1)   18  01:00    


 


Chloride  100 mEq/L ()   18  01:00    


 


Carbon Dioxide  21.4 mEq/L (21.0-31.0)   18  01:00    


 


Anion Gap  15.1  (7.0-16.0)   18  01:00    


 


BUN  19 mg/dL (7-25)   18  01:00    


 


Creatinine  0.9 mg/dL (0.7-1.3)   18  01:00    


 


Est GFR ( Amer)  > 60.0 ml/min (>90)   18  01:00    


 


Est GFR (Non-Af Amer)  > 60.0 ml/min  18  01:00    


 


BUN/Creatinine Ratio  21.1   18  01:00    


 


Glucose  239 mg/dL ()  H  18  01:00    


 


POC Glucose  259 MG/DL (70 - 105)  H  18  11:53    


 


Hemoglobin A1c %  10.0 % (4.0-6.0)  H  18  20:45    


 


Calcium  9.2 mg/dL (8.6-10.3)   18  01:00    


 


Total Bilirubin  0.5 mg/dL (0.3-1.0)   18  05:59    


 


Direct Bilirubin  0.04 mg/dL (0.0-0.2)   18  06:45    


 


AST  18 U/L (13-39)   18  05:59    


 


ALT  31 U/L (7-52)   18  05:59    


 


Alkaline Phosphatase  55 U/L ()   18  05:59    


 


Ammonia  65 umol/L (16-53)  H  18  06:45    


 


Total Protein  7.2 gm/dL (6.0-8.3)   18  05:59    


 


Albumin  4.2 gm/dL (4.2-5.5)   18  05:59    


 


Globulin  3.0 gm/dL  18  05:59    


 


Albumin/Globulin Ratio  1.4  (1.0-1.8)   18  05:59    


 


Free T3  2.7 pg/mL (2.0-4.4)   18  06:45    


 


Urine Source  RANDOM   18  01:40    


 


Urine Color  ORANGE   18  01:40    


 


Urine Clarity  CLEAR  (CLEAR)   18  01:40    


 


Urine pH  6.0  (4.6 - 8.0)   18  01:40    


 


Ur Specific Gravity  >= 1.030  (1.005-1.030)   18  01:40    


 


Urine Protein  30 mg/dL (NEGATIVE)  H  18  01:40    


 


Urine Glucose (UA)  100 mg/dL (NEGATIVE)  H  18  01:40    


 


Urine Ketones  NEGATIVE mg/dL (NEGATIVE)   18  01:40    


 


Urine Blood  NEGATIVE  (NEGATIVE)   18  01:40    


 


Urine Nitrate  NEGATIVE  (NEGATIVE)   18  01:40    


 


Urine Bilirubin  NEGATIVE  (NEGATIVE)   18  01:40    


 


Urine Urobilinogen  0.2 E.U./dL (0.2 - 1.0)   18  01:40    


 


Ur Leukocyte Esterase  NEGATIVE  (NEGATIVE)   18  01:40    


 


Urine RBC  0-2 /hpf (0-5)  H  18  01:40    


 


Urine WBC  2-5 /hpf (0-5)   18  01:40    


 


Ur Epithelial Cells  MODERATE /lpf (FEW)   18  01:40    


 


Urine Bacteria  FEW /hpf (NONE SEEN)   18  01:40    


 


Hyaline Casts  2-5 /lpf (0-2)  H  18  01:40    


 


Urine Mucus  MODERATE /lpf (FEW)   18  01:40    


 


Blood Type  O POSITIVE   18  00:50    


 


Antibody Screen  NEGATIVE   18  00:50    


 


Crossmatch  See Detail   18  00:50    














- Physical Exam


Vitals and I&O: 


 Vital Signs











Temp  97.9 F   18 15:41


 


Pulse  94   18 15:41


 


Resp  20   18 15:41


 


BP  146/80   18 15:41


 


Pulse Ox  95   18 15:41








 Intake & Output











 18





 18:59 06:59 18:59


 


Intake Total 750 480 


 


Output Total  600 


 


Balance 750 -120 


 


Weight (lbs) 124.738 kg 124.738 kg 


 


Intake:   


 


  Intake, IV Amount 250  


 


    Azithromycin 500 mg In 250  





    Sodium Chloride 0.9% 250   





    ml @ 250 mls/hr IV Q24H   





    [facility] Rx#:[id number]   


 


  Oral 500 480 


 


Output:   


 


  Urine  600 


 


Other:   


 


  # Voids 4 1 


 


  # Bowel Movements 2 1 


 


  Stool Characteristics Soft Soft 





 Brown Brown 











Active Medications: 


Current Medications





Acetaminophen (Tylenol)  650 mg PO Q4HR PRN


   PRN Reason: PAIN OR FEVER >100.4


   Stop: 18 20:10


   Last Admin: 18 23:47 Dose:  650 mg


Acetaminophen/Hydrocodone Bitart (Norco 10 Mg/325 Mg)  1 tab PO Q6H PRN


   PRN Reason: body pain


   Stop: 18 16:21


   Last Admin: 18 20:43 Dose:  1 tab


Acetaminophen/Hydrocodone Bitart (Norco 10 Mg/325 Mg)  1 tab PO Q4H PRN


   PRN Reason: PAIN  


   Stop: 18 20:10


   Last Admin: 02/10/18 04:47 Dose:  1 tab


Albuterol Sulfate (Albuterol 2.5mg/3ml Neb Ud)  2.5 mg HHN Q4HRT [facility]


   Stop: 18 02:59


   Last Admin: 18 15:18 Dose:  Not Given


Aspirin (Aspirin Chewable)  81 mg PO DAILY [facility]


   Stop: 18 08:59


   Last Admin: 18 10:51 Dose:  Not Given


Benzocaine/Menthol (Cepacol)  1 josiah MM Q4H PRN


   PRN Reason: Sore Throat


Bisacodyl (Dulcolax 10 Mg Supp)  10 mg RC DAILY PRN


   PRN Reason: Constipation


   Stop: 18 20:10


Budesonide (Pulmicort)  0.5 mg HHN BIDRT [facility]


   Stop: 18 06:59


   Last Admin: 18 06:48 Dose:  0.5 mg


Camphor/Menthol (Bengay Greaseless 10%-15%)  1 appl TP Q4H PRN


   PRN Reason: LEFT SHOULDER AND LOWER PAIN


Carisoprodol (Soma)  350 mg PO TID [facility]


   Stop: 18 20:59


   Last Admin: 18 15:03 Dose:  Not Given


Clopidogrel Bisulfate (Plavix)  75 mg PO DAILY [facility]


   Stop: 18 08:59


   Last Admin: 18 10:51 Dose:  Not Given


Dextrose (D50w)  50 ml IVP PRN PRN


   PRN Reason: BS <70


   Stop: 18 20:10


Famotidine (Pepcid)  20 mg PO BID [facility]


   Stop: 18 08:59


   Last Admin: 18 10:51 Dose:  Not Given


Furosemide (Lasix)  40 mg PO DAILY [facility]


   Stop: 18 08:59


   Last Admin: 18 10:51 Dose:  Not Given


Gabapentin (Neurontin)  600 mg PO BID [facility]


   Stop: 18 08:59


   Last Admin: 18 10:51 Dose:  Not Given


Gemfibrozil (Lopid)  600 mg PO BID [facility]


   Stop: 18 08:59


   Last Admin: 18 10:51 Dose:  Not Given


Glucagon (Glucagen)  1 mg IM PRN PRN


   PRN Reason: IF BS <70


Guaifenesin/Codeine Phosphate (Robitussin Ac)  10 ml PO Q6HR PRN


   PRN Reason: Allergy Symptoms


   Stop: 18 19:16


Hydralazine HCl (Apresoline)  50 mg PO TID [facility]


   Stop: 18 20:59


   Last Admin: 18 15:03 Dose:  Not Given


Azithromycin 500 mg/ Sodium (Chloride)  250 mls @ 250 mls/hr IV Q24H [facility]


   Stop: 18 17:59


   Last Infusion: 18 18:47 Dose:  Infused


Dextrose/Sodium Chloride (D5-0.45ns)  1,000 mls @ 50 mls/hr IV .Q20H [facility]


   Stop: 18 23:59


   Last Admin: 18 00:01 Dose:  50 mls/hr


Insulin Aspart (Novolog Insulin Sliding Scale)  0 units SUBQ Q6HR [facility]


   PRN Reason: Protocol


   Stop: 18 05:59


   Last Admin: 18 13:48 Dose:  Not Given


Insulin Detemir (Levemir Insulin)  15 units SUBQ QAM [facility]


   Stop: 18 08:59


   Last Admin: 18 10:50 Dose:  Not Given


Insulin Detemir (Levemir Insulin)  30 units SUBQ HS [facility]


   Stop: 18 20:59


   Last Admin: 18 20:41 Dose:  30 units


Ipratropium Bromide (Atrovent Neb 0.5mg/2.5ml)  0.5 mg HHN Q6HRT [facility]


   Stop: 18 00:59


   Last Admin: 18 13:10 Dose:  0.5 mg


Lactulose (Cephulac)  20 gm PO TID [facility]


   Stop: 18 20:59


   Last Admin: 18 15:03 Dose:  Not Given


Lorazepam (Ativan)  1 mg IVP Q6HR PRN; Protocol


   PRN Reason: Agitation


   Last Admin: 18 04:33 Dose:  1 mg


Losartan Potassium (Cozaar)  100 mg PO DAILY [facility]


   Stop: 18 08:59


   Last Admin: 18 10:50 Dose:  Not Given


Magnesium Hydroxide (Milk Of Magnesia)  30 ml PO DAILY PRN


   PRN Reason: Constipation


   Stop: 18 20:10


Metformin HCl (Glucophage)  850 mg PO BID [facility]


   Stop: 18 08:59


   Last Admin: 18 10:49 Dose:  Not Given


Methocarbamol (Robaxin)  500 mg PO Q6H [facility]


   Stop: 18 20:14


   Last Admin: 18 15:03 Dose:  Not Given


Morphine Sulfate (Morphine)  2 mg IVP Q4HR PRN


   PRN Reason: Pain (Moderate)


   Stop: 04/10/18 20:48


   Last Admin: 18 10:47 Dose:  2 mg


Morphine Sulfate (Morphine)  4 mg IVP Q4H PRN


   PRN Reason: Pain (Severe)


   Stop: 04/10/18 20:49


   Last Admin: 18 13:50 Dose:  4 mg


Ondansetron HCl (Zofran)  4 mg IV Q6H PRN


   PRN Reason: Nausea / Vomiting


   Stop: 04/10/18 20:50


Pantoprazole Sodium (Protonix)  40 mg PO DAILY [facility]


   Stop: 18 08:59


   Last Admin: 18 10:49 Dose:  Not Given


Sodium Phosphate (Fleet Enema)  135 ml RC DAILY PRN


   PRN Reason: IF MOM OR DULCOLAX INEFFECTIVE


   Stop: 18 20:10


Zolpidem Tartrate (Ambien)  10 mg PO 1800 PRN


   PRN Reason: Insomnia


   Last Admin: 18 23:57 Dose:  10 mg











- Procedures


Procedures: 


 Procedures











Procedure Code Date


 


EXCISION OF DUODENUM, ENDO, DIAGN [id number] 17


 


EXCISION OF STOMACH, ENDO, DIAGN [id number] 17














Assessment/Plan





- Assessment


Assessment: 





Early pna


acute bronchitis


generalized weakness


congestion





HTN


 DM


 CAD


 Asthma/COPD


hx CVA/TIA w/ left side weakness





- Plan


Plan: 





cardiology consultation


ivabx


accucheck with sliding scale 


contnue current orders 





Nutritional Asmnt/Malnutr-PDOC





- Dietary Evaluation


Malnutrition Findings (Please click <Entered> for more info): 








Nutritional Asmnt/Malnutrition                             Start:  18 18:

11


Text:                                                      Status: Complete    

  


Freq:                                                                          

  


 Document     18 18:11  EDITH  (Rec: 18 18:18  EDITH  [id number])


 Nutritional Asmnt/Malnutrition


     Patient General Information


      Nutritional Screening                      High Risk


      Diagnosis                                  early PNA, bronchitis


      Pertinent Medical Hx/Surgical Hx           HTN, DM, CAD, asthma/COPD, CVA


                                                 /TIA, PUD/GERD, appndectomy,


                                                 laparotomy, R tibial fracture


      Subjective Information                     Pt seen lying in bed at time


                                                 of visit, awake and alert. Pt


                                                 reported good appetite, the


                                                 food was small portion for him


                                                 .


      Current Diet Order/ Nutrition Support      low sodium


      Pertinent Medications                      lasix, novolog


      Pertinent Labs                              Cr 0.6, glucose 290, POC


                                                 276-314


                                                  A1c 10.0


     Nutritional Hx/Data


      Height                                     1.8 m


      Height (Calculated Centimeters)            180.3


      Current Weight (lbs)                       124.284 kg


      Weight (Calculated Kilograms)              124.3


      Weight (Calculated Grams)                  999203.3


      Ideal Body Weight                          166


      % Ideal Body Weight                        165


      Body Mass Index (BMI)                      38.2


      Weight Status                              Obese


     GI Symptoms


      GI Symptoms                                None


      Last BM                                    


      Difficult in:                              None


      Usual diet at home                         pt stated he did not follow


                                                 any diet restrictions. He have


                                                 candy bars, chips everyday.


                                                 BS was stayed around 300.


      Skin Integrity/Comment:                    intact


     Estimated Nutritional Goals


      BEE in Kcals:                              Adj wt of IBW


      Calories/Kcals/Kg                          25-30


      Kcals Calculated                           5822-7815


      Protein:                                   Adj wt of IBW


      Protein g/k


      Protein Calculated                         88


      Fluid: ml                                  2200-2640ml (1ml/kcal)


     Nutritional Problem


      1. Problem


       Problem                                   altered nutrition related lab


                                                 values


       Etiology                                  hx of DM, excessive


                                                 carborhydrates intake


       Signs/Symptoms:                           glucose 290, -314


     Malnutrition Alert


      Protein-Calorie Malnutrition               N/A


      Is there a minimum of two criteria         No


       selected?                                 


       Query Text:Check all the applicable       


       criteria. A minimum of two criteria are   


       recommended for diagnosis of either       


       severe or non-severe malnutrition.        


     Intervention/Recommendation


      Comments                                   1. Recommend CCHO-60gm, double


                                                 protein and veggetables for


                                                 optimal glycemic control. RN


                                                 notified.


                                                 2. Diabetes education provide.


                                                 Pt showed interest.


                                                 3. Monitor PO intake, wt, labs


                                                 and skin integrity


                                                 4. F/U as moderate risk in 3-5


                                                 days, 2/10-


     Expected Outcomes/Goals


      Expected Outcomes/Goals                    1. PO intake to meet at least


                                                 75% of nutritional needs.


                                                 2. Wt stability, skin to


                                                 remain intact, labs to


                                                 approach WNL.

## 2018-02-13 LAB
ALBUMIN SERPL-MCNC: 3.7 GM/DL (ref 4.2–5.5)
ALBUMIN/GLOB SERPL: 1.2 {RATIO} (ref 1–1.8)
ALP SERPL-CCNC: 51 U/L (ref 34–104)
ALT SERPL-CCNC: 33 U/L (ref 7–52)
ANION GAP SERPL CALC-SCNC: 14 MMOL/L (ref 7–16)
AST SERPL-CCNC: 29 U/L (ref 13–39)
BASOPHILS # BLD AUTO: 0.1 TH/CUMM (ref 0–0.2)
BASOPHILS NFR BLD AUTO: 0.9 % (ref 0–2)
BILIRUB SERPL-MCNC: 0.3 MG/DL (ref 0.3–1)
BUN SERPL-MCNC: 18 MG/DL (ref 7–25)
CALCIUM SERPL-MCNC: 8.7 MG/DL (ref 8.6–10.3)
CHLORIDE SERPL-SCNC: 101 MEQ/L (ref 98–107)
CO2 SERPL-SCNC: 24.6 MEQ/L (ref 21–31)
CREAT SERPL-MCNC: 0.6 MG/DL (ref 0.7–1.3)
EOSINOPHIL # BLD AUTO: 0 TH/CMM (ref 0.1–0.4)
EOSINOPHIL NFR BLD AUTO: 0.3 % (ref 0–5)
ERYTHROCYTE [DISTWIDTH] IN BLOOD BY AUTOMATED COUNT: 12.7 % (ref 11.5–20)
GLOBULIN SER-MCNC: 3 GM/DL
GLUCOSE SERPL-MCNC: 284 MG/DL (ref 70–105)
HCT VFR BLD CALC: 35.3 % (ref 41–60)
HGB BLD-MCNC: 11.9 GM/DL (ref 12–16)
LYMPHOCYTE AB SER FC-ACNC: 1.2 TH/CMM (ref 1.5–3)
LYMPHOCYTES NFR BLD AUTO: 11.5 % (ref 20–50)
MCH RBC QN AUTO: 29.1 PG (ref 26–30)
MCHC RBC AUTO-ENTMCNC: 33.6 PG (ref 28–36)
MCV RBC AUTO: 86.7 FL (ref 80–99)
MONOCYTES # BLD AUTO: 1.2 TH/CMM (ref 0.3–1)
MONOCYTES NFR BLD AUTO: 11.3 % (ref 2–10)
NEUTROPHILS # BLD: 7.7 TH/CMM (ref 1.8–8)
NEUTROPHILS NFR BLD AUTO: 76 % (ref 40–80)
PLATELET # BLD: 329 TH/CMM (ref 150–400)
PMV BLD AUTO: 7.8 FL
POTASSIUM SERPL-SCNC: 3.6 MEQ/L (ref 3.5–5.1)
RBC # BLD AUTO: 4.07 MIL/CMM (ref 4.3–5.7)
SODIUM SERPL-SCNC: 136 MEQ/L (ref 136–145)
WBC # BLD AUTO: 10.2 TH/CMM (ref 4.8–10.8)

## 2018-02-13 RX ADMIN — HYDROCODONE BITATRATE AND ACETAMINOPHEN PRN TAB: 10; 325 TABLET ORAL at 04:23

## 2018-02-13 RX ADMIN — INSULIN ASPART SCH: 100 INJECTION, SOLUTION INTRAVENOUS; SUBCUTANEOUS at 12:43

## 2018-02-13 RX ADMIN — INSULIN ASPART SCH UNITS: 100 INJECTION, SOLUTION INTRAVENOUS; SUBCUTANEOUS at 17:45

## 2018-02-13 RX ADMIN — LACTULOSE SCH GM: 20 SOLUTION ORAL at 08:58

## 2018-02-13 RX ADMIN — ALBUTEROL SULFATE SCH: 2.5 SOLUTION RESPIRATORY (INHALATION) at 16:00

## 2018-02-13 RX ADMIN — MORPHINE SULFATE PRN MG: 2 INJECTION, SOLUTION INTRAMUSCULAR; INTRAVENOUS at 00:12

## 2018-02-13 RX ADMIN — LACTULOSE SCH: 20 SOLUTION ORAL at 18:07

## 2018-02-13 RX ADMIN — ASPIRIN 81 MG SCH MG: 81 TABLET ORAL at 08:58

## 2018-02-13 RX ADMIN — MORPHINE SULFATE PRN MG: 4 INJECTION, SOLUTION INTRAMUSCULAR; INTRAVENOUS at 15:21

## 2018-02-13 RX ADMIN — INSULIN DETEMIR SCH UNIT: 100 INJECTION, SOLUTION SUBCUTANEOUS at 20:33

## 2018-02-13 RX ADMIN — ALBUTEROL SULFATE SCH MG: 2.5 SOLUTION RESPIRATORY (INHALATION) at 07:16

## 2018-02-13 RX ADMIN — INSULIN ASPART SCH UNITS: 100 INJECTION, SOLUTION INTRAVENOUS; SUBCUTANEOUS at 12:43

## 2018-02-13 RX ADMIN — BUDESONIDE SCH MG: 0.5 SUSPENSION RESPIRATORY (INHALATION) at 20:22

## 2018-02-13 RX ADMIN — INSULIN ASPART SCH UNITS: 100 INJECTION, SOLUTION INTRAVENOUS; SUBCUTANEOUS at 00:12

## 2018-02-13 RX ADMIN — INSULIN DETEMIR SCH UNITS: 100 INJECTION, SOLUTION SUBCUTANEOUS at 09:12

## 2018-02-13 RX ADMIN — LACTULOSE SCH: 20 SOLUTION ORAL at 09:06

## 2018-02-13 RX ADMIN — ALBUTEROL SULFATE SCH: 2.5 SOLUTION RESPIRATORY (INHALATION) at 02:07

## 2018-02-13 RX ADMIN — PANTOPRAZOLE SODIUM SCH MG: 40 TABLET, DELAYED RELEASE ORAL at 08:58

## 2018-02-13 RX ADMIN — MORPHINE SULFATE PRN MG: 4 INJECTION, SOLUTION INTRAMUSCULAR; INTRAVENOUS at 21:57

## 2018-02-13 RX ADMIN — IPRATROPIUM BROMIDE SCH MG: 0.5 SOLUTION RESPIRATORY (INHALATION) at 07:16

## 2018-02-13 RX ADMIN — IPRATROPIUM BROMIDE SCH MG: 0.5 SOLUTION RESPIRATORY (INHALATION) at 12:04

## 2018-02-13 RX ADMIN — MORPHINE SULFATE PRN MG: 4 INJECTION, SOLUTION INTRAMUSCULAR; INTRAVENOUS at 02:34

## 2018-02-13 RX ADMIN — HYDROCODONE BITATRATE AND ACETAMINOPHEN PRN TAB: 10; 325 TABLET ORAL at 12:39

## 2018-02-13 RX ADMIN — MORPHINE SULFATE PRN MG: 2 INJECTION, SOLUTION INTRAMUSCULAR; INTRAVENOUS at 06:40

## 2018-02-13 RX ADMIN — LACTULOSE SCH: 20 SOLUTION ORAL at 21:00

## 2018-02-13 RX ADMIN — ALBUTEROL SULFATE SCH: 2.5 SOLUTION RESPIRATORY (INHALATION) at 23:46

## 2018-02-13 RX ADMIN — HYDROCODONE BITATRATE AND ACETAMINOPHEN PRN TAB: 10; 325 TABLET ORAL at 20:57

## 2018-02-13 RX ADMIN — ALBUTEROL SULFATE SCH MG: 2.5 SOLUTION RESPIRATORY (INHALATION) at 20:21

## 2018-02-13 RX ADMIN — MORPHINE SULFATE PRN MG: 4 INJECTION, SOLUTION INTRAMUSCULAR; INTRAVENOUS at 10:44

## 2018-02-13 RX ADMIN — INSULIN ASPART SCH UNITS: 100 INJECTION, SOLUTION INTRAVENOUS; SUBCUTANEOUS at 06:53

## 2018-02-13 RX ADMIN — IPRATROPIUM BROMIDE SCH: 0.5 SOLUTION RESPIRATORY (INHALATION) at 01:55

## 2018-02-13 RX ADMIN — BUDESONIDE SCH MG: 0.5 SUSPENSION RESPIRATORY (INHALATION) at 07:33

## 2018-02-13 RX ADMIN — ALBUTEROL SULFATE SCH MG: 2.5 SOLUTION RESPIRATORY (INHALATION) at 12:04

## 2018-02-13 NOTE — INTERNAL MEDICINE PROG NOTE
Internal Medicine Subjective





- Subjective


Service Date: 18 (s/p interochanteric fx left hip displace closed)


Patient is:: awake


Patient Complaints of:: congestion


Per staff patient has:: tolerating meds





Internal Medicine Objective





- Results


Result Diagrams: 


 18 05:30





 18 05:30


Recent Labs: 


 Laboratory Last Values











WBC  10.2 Th/cmm (4.8-10.8)   18  05:30    


 


RBC  4.07 Mil/cmm (4.30-5.70)  L  18  05:30    


 


Hgb  11.9 gm/dL (12-16)  L  18  05:30    


 


Hct  35.3 % (41.0-60)  L  18  05:30    


 


MCV  86.7 fl (80-99)   18  05:30    


 


MCH  29.1 pg (26.0-30.0)   18  05:30    


 


MCHC Differential  33.6 pg (28.0-36.0)   18  05:30    


 


RDW  12.7 % (11.5-20.0)   18  05:30    


 


Plt Count  329 Th/cmm (150-400)   18  05:30    


 


MPV  7.8 fl  18  05:30    


 


Neutrophils %  76.0 % (40.0-80.0)   18  05:30    


 


Lymphocytes %  11.5 % (20.0-50.0)  L  18  05:30    


 


Monocytes %  11.3 % (2.0-10.0)  H  18  05:30    


 


Eosinophils %  0.3 % (0.0-5.0)   18  05:30    


 


Basophils %  0.9 % (0.0-2.0)   18  05:30    


 


PT  9.8 SECONDS (9.5-11.5)   18  09:18    


 


INR  0.94  (0.5-1.4)   18  09:18    


 


PTT (Actin FS)  26.1 SECONDS (26.0-38.0)   18  09:18    


 


Specimen Source  Arterial   18  10:54    


 


Sample Site  Right Radial   18  10:54    


 


pH  7.41  (7.35-7.45)   18  10:54    


 


pCO2  43.0 mmHg (35.0-45.0)   18  10:54    


 


pO2  60.0 mmHg (80.0-100.0)  L  18  10:54    


 


HCO3  26.6 mEq/L (20.0-26.0)  H  18  10:54    


 


Base Excess  2.3 mEq/L (-3.0-3.0)   18  10:54    


 


O2 Saturation  91.0 % (92.0-100.0)  L  18  10:54    


 


Glen Test  Positive   18  10:54    


 


Vent Rate  NA   18  10:54    


 


Inspired O2  21   18  10:54    


 


Tidal Volume  NA   18  10:54    


 


PEEP  NA   18  10:54    


 


Pressure (ins/psv/peep)  NA   18  10:54    


 


Critical Value  LZHANG   18  10:54    


 


Sodium  136 mEq/L (136-145)   18  05:30    


 


Potassium  3.6 mEq/L (3.5-5.1)   18  05:30    


 


Chloride  101 mEq/L ()   18  05:30    


 


Carbon Dioxide  24.6 mEq/L (21.0-31.0)   18  05:30    


 


Anion Gap  14.0  (7.0-16.0)   18  05:30    


 


BUN  18 mg/dL (7-25)   18  05:30    


 


Creatinine  0.6 mg/dL (0.7-1.3)  L  18  05:30    


 


Est GFR ( Amer)  > 60.0 ml/min (>90)   18  05:30    


 


Est GFR (Non-Af Amer)  > 60.0 ml/min  18  05:30    


 


BUN/Creatinine Ratio  30.0   18  05:30    


 


Glucose  284 mg/dL ()  H  18  05:30    


 


POC Glucose  272 MG/DL (70 - 105)  H  18  06:40    


 


Hemoglobin A1c %  10.0 % (4.0-6.0)  H  18  20:45    


 


Calcium  8.7 mg/dL (8.6-10.3)   18  05:30    


 


Total Bilirubin  0.3 mg/dL (0.3-1.0)   18  05:30    


 


Direct Bilirubin  0.04 mg/dL (0.0-0.2)   18  06:45    


 


AST  29 U/L (13-39)   18  05:30    


 


ALT  33 U/L (7-52)   18  05:30    


 


Alkaline Phosphatase  51 U/L ()   18  05:30    


 


Ammonia  65 umol/L (16-53)  H  18  06:45    


 


Total Protein  6.7 gm/dL (6.0-8.3)   18  05:30    


 


Albumin  3.7 gm/dL (4.2-5.5)  L  18  05:30    


 


Globulin  3.0 gm/dL  18  05:30    


 


Albumin/Globulin Ratio  1.2  (1.0-1.8)   18  05:30    


 


Free T3  2.7 pg/mL (2.0-4.4)   18  06:45    


 


Urine Source  RANDOM   18  01:40    


 


Urine Color  ORANGE   18  01:40    


 


Urine Clarity  CLEAR  (CLEAR)   18  01:40    


 


Urine pH  6.0  (4.6 - 8.0)   18  01:40    


 


Ur Specific Gravity  >= 1.030  (1.005-1.030)   18  01:40    


 


Urine Protein  30 mg/dL (NEGATIVE)  H  18  01:40    


 


Urine Glucose (UA)  100 mg/dL (NEGATIVE)  H  18  01:40    


 


Urine Ketones  NEGATIVE mg/dL (NEGATIVE)   18  01:40    


 


Urine Blood  NEGATIVE  (NEGATIVE)   18  01:40    


 


Urine Nitrate  NEGATIVE  (NEGATIVE)   18  01:40    


 


Urine Bilirubin  NEGATIVE  (NEGATIVE)   18  01:40    


 


Urine Urobilinogen  0.2 E.U./dL (0.2 - 1.0)   18  01:40    


 


Ur Leukocyte Esterase  NEGATIVE  (NEGATIVE)   18  01:40    


 


Urine RBC  0-2 /hpf (0-5)  H  18  01:40    


 


Urine WBC  2-5 /hpf (0-5)   18  01:40    


 


Ur Epithelial Cells  MODERATE /lpf (FEW)   18  01:40    


 


Urine Bacteria  FEW /hpf (NONE SEEN)   18  01:40    


 


Hyaline Casts  2-5 /lpf (0-2)  H  18  01:40    


 


Urine Mucus  MODERATE /lpf (FEW)   18  01:40    


 


Blood Type  O POSITIVE   18  00:50    


 


Antibody Screen  NEGATIVE   18  00:50    


 


Crossmatch  See Detail   18  00:50    














- Physical Exam


Vitals and I&O: 


 Vital Signs











Temp  98.1 F   18 08:00


 


Pulse  98   18 09:00


 


Resp  18   18 08:00


 


BP  134/64   18 09:00


 


Pulse Ox  98   18 08:00








 Intake & Output











 18





 18:59 06:59 18:59


 


Intake Total   500


 


Output Total   1000


 


Balance   -500


 


Weight (lbs) 275 lb  275 lb


 


Intake:   


 


  Oral   500


 


Output:   


 


  Urine   1000


 


Other:   


 


  # Voids 2  


 


  # Bowel Movements 1  0











Active Medications: 


Current Medications





Acetaminophen (Tylenol)  650 mg PO Q4HR PRN


   PRN Reason: PAIN OR FEVER >100.4


   Stop: 18 20:10


   Last Admin: 18 23:47 Dose:  650 mg


Acetaminophen/Hydrocodone Bitart (Norco 10 Mg/325 Mg)  1 tab PO Q6H PRN


   PRN Reason: body pain


   Stop: 18 16:21


   Last Admin: 18 04:23 Dose:  1 tab


Acetaminophen/Hydrocodone Bitart (Norco 10 Mg/325 Mg)  1 tab PO Q4H PRN


   PRN Reason: PAIN  


   Stop: 18 20:10


   Last Admin: 02/10/18 04:47 Dose:  1 tab


Albuterol Sulfate (Albuterol 2.5mg/3ml Neb Ud)  2.5 mg HHN Q4HRT [facility]


   Stop: 18 02:59


   Last Admin: 18 07:16 Dose:  2.5 mg


Aspirin (Aspirin Chewable)  81 mg PO DAILY [facility]


   Stop: 18 08:59


   Last Admin: 18 08:58 Dose:  81 mg


Benzocaine/Menthol (Cepacol)  1 josiah MM Q4H PRN


   PRN Reason: Sore Throat


Bisacodyl (Dulcolax 10 Mg Supp)  10 mg RC DAILY PRN


   PRN Reason: Constipation


   Stop: 18 20:10


Budesonide (Pulmicort)  0.5 mg HHN BIDRT [facility]


   Stop: 18 06:59


   Last Admin: 18 07:33 Dose:  0.5 mg


Camphor/Menthol (Bengay Greaseless 10%-15%)  1 appl TP Q4H PRN


   PRN Reason: LEFT SHOULDER AND LOWER PAIN


Carisoprodol (Soma)  350 mg PO TID [facility]


   Stop: 18 20:59


   Last Admin: 18 08:57 Dose:  350 mg


Clopidogrel Bisulfate (Plavix)  75 mg PO DAILY [facility]


   Stop: 18 08:59


   Last Admin: 18 08:58 Dose:  75 mg


Docusate Sodium (Colace)  100 mg PO DAILY PRN


   PRN Reason: Constipation


   Stop: 18 08:59


Famotidine (Pepcid)  20 mg PO BID [facility]


   Stop: 18 08:59


   Last Admin: 18 08:58 Dose:  20 mg


Fentanyl Citrate (Sublimaze)  50 mcg IVP UD PRN


   PRN Reason: Pain (Moderate)


   Stop: 18 18:19


Furosemide (Lasix)  40 mg PO DAILY [facility]


   Stop: 18 08:59


   Last Admin: 18 08:57 Dose:  40 mg


Gabapentin (Neurontin)  600 mg PO BID [facility]


   Stop: 18 08:59


   Last Admin: 18 08:58 Dose:  600 mg


Gemfibrozil (Lopid)  600 mg PO BID [facility]


   Stop: 18 08:59


   Last Admin: 18 08:59 Dose:  600 mg


Glucagon (Glucagen)  1 mg IM PRN PRN


   PRN Reason: IF BS <70


Guaifenesin/Codeine Phosphate (Robitussin Ac)  10 ml PO Q6HR PRN


   PRN Reason: Allergy Symptoms


   Stop: 18 19:16


Hydralazine HCl (Apresoline)  50 mg PO TID [facility]


   Stop: 18 20:59


   Last Admin: 18 09:00 Dose:  50 mg


Azithromycin 500 mg/ Sodium (Chloride)  250 mls @ 250 mls/hr IV Q24H [facility]


   Stop: 18 17:59


   Last Admin: 18 08:47 Dose:  Not Given


Sodium Chloride (Nacl 0.45%)  1,000 mls @ 60 mls/hr IV .[id number] [facility]


   Stop: 18 00:29


   Last Admin: 18 00:55 Dose:  60 mls/hr


Insulin Aspart (Novolog Insulin Sliding Scale)  0 units SUBQ Q6HR [facility]


   PRN Reason: Protocol


   Stop: 18 05:59


   Last Admin: 18 06:53 Dose:  6 units


Insulin Detemir (Levemir Insulin)  15 units SUBQ QAM [facility]


   Stop: 18 08:59


   Last Admin: 18 09:12 Dose:  15 units


Insulin Detemir (Levemir Insulin)  30 units SUBQ HS [facility]


   Stop: 18 20:59


   Last Admin: 18 22:19 Dose:  30 units


Ipratropium Bromide (Atrovent Neb 0.5mg/2.5ml)  0.5 mg HHN Q6HRT [facility]


   Stop: 18 00:59


   Last Admin: 18 07:16 Dose:  0.5 mg


Lactulose (Cephulac)  20 gm PO TID [facility]


   Stop: 18 20:59


   Last Admin: 18 09:06 Dose:  Not Given


Lorazepam (Ativan)  1 mg IVP Q6HR PRN; Protocol


   PRN Reason: Agitation


   Last Admin: 18 03:19 Dose:  1 mg


Losartan Potassium (Cozaar)  100 mg PO DAILY [facility]


   Stop: 18 08:59


   Last Admin: 18 08:58 Dose:  100 mg


Magnesium Hydroxide (Milk Of Magnesia)  30 ml PO DAILY PRN


   PRN Reason: Constipation


   Stop: 18 20:10


Meperidine HCl (Demerol)  12.5 mg IVP UD PRN


   PRN Reason: POST-OP PAIN


   Stop: 18 18:19


   Last Admin: 18 18:57 Dose:  12.5 mg


Metformin HCl (Glucophage)  850 mg PO BID [facility]


   Stop: 18 08:59


   Last Admin: 18 09:01 Dose:  850 mg


Methocarbamol (Robaxin)  500 mg PO Q6H [facility]


   Stop: 18 20:14


   Last Admin: 18 09:12 Dose:  500 mg


Morphine Sulfate (Morphine)  2 mg IVP Q4HR PRN


   PRN Reason: Pain (Moderate)


   Stop: 04/10/18 20:48


   Last Admin: 18 06:40 Dose:  2 mg


Morphine Sulfate (Morphine)  4 mg IVP Q4H PRN


   PRN Reason: Pain (Severe)


   Stop: 04/10/18 20:49


   Last Admin: 18 10:44 Dose:  4 mg


Multivitamins/Vitamin C (Theragran)  1 tab PO DAILY [facility]


   Stop: 18 08:59


   Last Admin: 18 08:58 Dose:  1 tab


Ondansetron HCl (Zofran)  4 mg IV Q6H PRN


   PRN Reason: Nausea / Vomiting


   Stop: 04/10/18 20:50


Ondansetron HCl (Zofran)  4 mg IV UD PRN


   PRN Reason: Nausea / Vomiting


   Stop: 18 18:19


Pantoprazole Sodium (Protonix)  40 mg PO DAILY [facility]


   Stop: 18 08:59


   Last Admin: 18 08:58 Dose:  40 mg


Rivaroxaban (Xarelto)  10 mg PO DAILY [facility]


   Stop: 18 15:59


Sodium Phosphate (Fleet Enema)  135 ml RC DAILY PRN


   PRN Reason: IF MOM OR DULCOLAX INEFFECTIVE


   Stop: 18 20:10


Zolpidem Tartrate (Ambien)  10 mg PO 1800 PRN


   PRN Reason: Insomnia


   Last Admin: 18 23:12 Dose:  10 mg








General: weak


HEENT: NC/AT, PERRLA


Neck: Supple


Lungs: CTAB


Abdomen: soft, non-tender





- Procedures


Procedures: 


 Procedures











Procedure Code Date


 


EXCISION OF DUODENUM, ENDO, DIAGN 4OD71QT 17


 


EXCISION OF STOMACH, ENDO, DIAGN 0FV36UA 17


 


REPOSITION LEFT UPPER FEMUR WITH INT FIX, OPEN APPROACH 4UA407I 18


 


TREAT THIGH FRACTURE 23148 18














Internal Medicine Assmt/Plan





- Assessment


Assessment: 








Early pna


acute bronchitis


generalized weakness


congestion





HTN


 DM


 CAD


 Asthma/COPD


hx CVA/TIA w/ left side weakness





- Plan


Plan: 





ivabx


bronchodilators and supplemental oxygen


continue current plan of care 





Nutritional Asmnt/Malnutr-PDOC





- Dietary Evaluation


Malnutrition Findings (Please click <Entered> for more info): 








Nutritional Asmnt/Malnutrition                             Start:  18 18:

11


Text:                                                      Status: Complete    

  


Freq:                                                                          

  


 Document     18 18:11  LUCRETIA  (Rec: 18 18:18  LUCRETIA  LIDA-FNS1)


 Nutritional Asmnt/Malnutrition


     Patient General Information


      Nutritional Screening                      High Risk


      Diagnosis                                  early PNA, bronchitis


      Pertinent Medical Hx/Surgical Hx           HTN, DM, CAD, asthma/COPD, CVA


                                                 /TIA, PUD/GERD, appndectomy,


                                                 laparotomy, R tibial fracture


      Subjective Information                     Pt seen lying in bed at time


                                                 of visit, awake and alert. Pt


                                                 reported good appetite, the


                                                 food was small portion for him


                                                 .


      Current Diet Order/ Nutrition Support      low sodium


      Pertinent Medications                      lasix, novolog


      Pertinent Labs                              Cr 0.6, glucose 290, POC


                                                 276-314


                                                 2/ A1c 10.0


     Nutritional Hx/Data


      Height                                     5 ft 11 in


      Height (Calculated Centimeters)            180.3


      Current Weight (lbs)                       274 lb


      Weight (Calculated Kilograms)              124.3


      Weight (Calculated Grams)                  538525.3


      Ideal Body Weight                          166


      % Ideal Body Weight                        165


      Body Mass Index (BMI)                      38.2


      Weight Status                              Obese


     GI Symptoms


      GI Symptoms                                None


      Last BM                                    2


      Difficult in:                              None


      Usual diet at home                         pt stated he did not follow


                                                 any diet restrictions. He have


                                                 candy bars, chips everyday.


                                                 BS was stayed around 300.


      Skin Integrity/Comment:                    intact


     Estimated Nutritional Goals


      BEE in Kcals:                              Adj wt of IBW


      Calories/Kcals/Kg                          25-30


      Kcals Calculated                           9112-1176


      Protein:                                   Adj wt of IBW


      Protein g/k


      Protein Calculated                         88


      Fluid: ml                                  2200-2640ml (1ml/kcal)


     Nutritional Problem


      1. Problem


       Problem                                   altered nutrition related lab


                                                 values


       Etiology                                  hx of DM, excessive


                                                 carborhydrates intake


       Signs/Symptoms:                           glucose 290, -314


     Malnutrition Alert


      Protein-Calorie Malnutrition               N/A


      Is there a minimum of two criteria         No


       selected?                                 


       Query Text:Check all the applicable       


       criteria. A minimum of two criteria are   


       recommended for diagnosis of either       


       severe or non-severe malnutrition.        


     Intervention/Recommendation


      Comments                                   1. Recommend CCHO-60gm, double


                                                 protein and veggetables for


                                                 optimal glycemic control. RN


                                                 notified.


                                                 2. Diabetes education provide.


                                                 Pt showed interest.


                                                 3. Monitor PO intake, wt, labs


                                                 and skin integrity


                                                 4. F/U as moderate risk in 3-5


                                                 days, 2/10-


     Expected Outcomes/Goals


      Expected Outcomes/Goals                    1. PO intake to meet at least


                                                 75% of nutritional needs.


                                                 2. Wt stability, skin to


                                                 remain intact, labs to


                                                 approach WNL.

## 2018-02-13 NOTE — PROGRESS NOTES
DATE:  02/12/2018



PULMONARY PROGRESS NOTE



PROBLEM LIST:

1.  Acute exacerbation of chronic obstructive pulmonary disease, stabilized and

improved.

2.  Obstructive sleep apnea syndrome.

3.  History of cerebrovascular accident.

4.  Morbid obesity.

5.  A new issue with history of fall with fracture of left hip, which has been

fixed this evening by orthopedic surgeon.  The patient has relatively less pain.

 No specific new symptoms, etc.  Coughing is almost gone.



PHYSICAL EXAMINATION:

VITAL SIGNS:  Temperature is 98.7, blood pressure is ____, saturation is 96 on 2

liters per minute.

NECK:  Veins not visualized.

CHEST:  Shows diminished air entry with occasional rhonchi.

HEART:  Regular.

ABDOMEN:  Soft, nontender.



LABORATORY DATA:  White count is 11,000, hemoglobin 13, potassium is 3.5.



ASSESSMENT:  The patient is clinically stable respiratory wise.  Status post

left hip open reduction and internal fixation, underlying metabolic disease

including obstructive sleep apnea syndrome.



PLANS AND SUGGESTIONS:  We will continue current treatment.  Might require acute

rehabilitation or similar facility for post-hip surgery and go from there.





DD: 02/12/2018 22:10

DT: 02/13/2018 03:59

JOB# [id number]  [id number]

## 2018-02-13 NOTE — OPERATIVE REPORT
DATE OF SURGERY:  02/12/2018



PREOPERATIVE DIAGNOSIS:  Intertrochanteric fracture, left hip, displaced,

closed.



POSTOPERATIVE DIAGNOSIS:  Intertrochanteric fracture, left hip, displaced,

closed.



SURGEON:  Markos Samayoa MD.



ASSISTANT:  BRENDA Lama MD.



ANESTHESIOLOGIST:  Dr. Damon.



ANESTHESIA:  General anesthesia.



PROCEDURE:  Open reduction and internal fixation with [employer]hew Trigen

Intertan hip nail.



PROCEDURE IN DETAIL:  Following satisfactory anesthesia by Dr. Damon, the

patient was given intravenous antibiotic.  A Levin catheter was placed by the OR

nurse.  The patient was then placed on the fracture table using appropriately

positioned and padded so as to avoid pressure areas.  A gentle reduction was

carried out on the fractured left hip and the left leg put up in traction and

slight internal rotation, the hip was then adducted.  The C-arm was used to view

the fracture, which appeared anatomically reduced, the left hip and lower

extremity were sterilely prepped and draped.  The sterile barrier drape was used

over the hip.  Under C-arm guidance, the tip of the greater trochanter was

located and marked on the skin.  A small incision was made just above the

greater trochanter and the incision carried down through the fascia to the bone.

 A guidewire was then passed from the tip of the greater trochanter into the

proximal femur and checked with the C-arm.  The soft tissue protector was placed

and the reamer used to open up the proximal femur.  A long guidewire was passed

down the femur to just above the patella.  Measurements were taken and a 40 cm

long, 10 mm diameter nail selected.  Progressive reamers to 11.5 mm diameter

were used over the guidewire.  The nail was then assembled with the insertion

device and the nail inserted to the appropriate depth, which was checked and

viewed with the C-arm.  The lag screw drill sleeve was then inserted into the

drill guide and a small incision made.  The drill sleeve and trocar were

inserted down to bone and locked in place, a 3.2 mm guidewire was then passed up

into the center of the head as viewed in both the AP and lateral projections

with the C-arm.  Measurements were taken.  The 7 mm starter drill was used for

the compression screw and then the 7 mm compression screw drill was used to the

appropriate depth and removed.  The anti-rotation sleeve was then inserted and

the lag screw reamer used to the appropriate depth to prepare for the lag screw,

which was then placed up into the femur.  This was checked with the C-arm. 

Then, the 7 mm compression screw was placed nicely compressing the fracture. 

The insertion guide was removed.  The wounds were irrigated and closed with 2-0

Vicryl in the deeper tissues and staples on the skin, 20 mL of Exparel diluted

to 50 mL was then injected at the incisions and down to bone for postoperative

analgesia.  The wounds were washed with chlorhexidine and dried.  Betadine

ointment, Xeroform gauze and sterile dressings were applied.  Total estimated

blood loss was less than 200 mL.  There was no replacement.  There were no

drains or tubes.



IMMEDIATE POSTOPERATIVE CONDITION:  Good.





DD: 02/12/2018 23:16

DT: 02/13/2018 02:30

JOB# [id number]  [id number]

## 2018-02-13 NOTE — DIAGNOSTIC IMAGING REPORT
Left hip (intraoperative fluoroscopic images and services)



HISTORY: Fracture



Intraoperative fluoroscopic images and services were provided for

facilitation of surgical intervention.



1 minute 57 seconds fluoroscopy time was utilized.

## 2018-02-14 LAB
ANION GAP SERPL CALC-SCNC: 11.4 MMOL/L (ref 7–16)
BASOPHILS # BLD AUTO: 0.1 TH/CUMM (ref 0–0.2)
BASOPHILS NFR BLD AUTO: 0.9 % (ref 0–2)
BUN SERPL-MCNC: 14 MG/DL (ref 7–25)
CALCIUM SERPL-MCNC: 8.7 MG/DL (ref 8.6–10.3)
CHLORIDE SERPL-SCNC: 100 MEQ/L (ref 98–107)
CO2 SERPL-SCNC: 26.1 MEQ/L (ref 21–31)
CREAT SERPL-MCNC: 0.6 MG/DL (ref 0.7–1.3)
EOSINOPHIL # BLD AUTO: 0.2 TH/CMM (ref 0.1–0.4)
EOSINOPHIL NFR BLD AUTO: 2.7 % (ref 0–5)
ERYTHROCYTE [DISTWIDTH] IN BLOOD BY AUTOMATED COUNT: 13.1 % (ref 11.5–20)
GLUCOSE SERPL-MCNC: 158 MG/DL (ref 70–105)
HCT VFR BLD CALC: 33.3 % (ref 41–60)
HGB BLD-MCNC: 11.5 GM/DL (ref 12–16)
LYMPHOCYTE AB SER FC-ACNC: 1.7 TH/CMM (ref 1.5–3)
LYMPHOCYTES NFR BLD AUTO: 18.3 % (ref 20–50)
MCH RBC QN AUTO: 29.5 PG (ref 26–30)
MCHC RBC AUTO-ENTMCNC: 34.4 PG (ref 28–36)
MCV RBC AUTO: 85.9 FL (ref 80–99)
MONOCYTES # BLD AUTO: 1.2 TH/CMM (ref 0.3–1)
MONOCYTES NFR BLD AUTO: 13.2 % (ref 2–10)
NEUTROPHILS # BLD: 5.9 TH/CMM (ref 1.8–8)
NEUTROPHILS NFR BLD AUTO: 64.9 % (ref 40–80)
PLATELET # BLD: 373 TH/CMM (ref 150–400)
PMV BLD AUTO: 7.4 FL
POTASSIUM SERPL-SCNC: 3.5 MEQ/L (ref 3.5–5.1)
RBC # BLD AUTO: 3.88 MIL/CMM (ref 4.3–5.7)
SODIUM SERPL-SCNC: 134 MEQ/L (ref 136–145)
WBC # BLD AUTO: 9.1 TH/CMM (ref 4.8–10.8)

## 2018-02-14 RX ADMIN — LACTULOSE SCH GM: 20 SOLUTION ORAL at 20:15

## 2018-02-14 RX ADMIN — PANTOPRAZOLE SODIUM SCH MG: 40 TABLET, DELAYED RELEASE ORAL at 10:27

## 2018-02-14 RX ADMIN — ALBUTEROL SULFATE SCH MG: 2.5 SOLUTION RESPIRATORY (INHALATION) at 16:03

## 2018-02-14 RX ADMIN — IPRATROPIUM BROMIDE SCH MG: 0.5 SOLUTION RESPIRATORY (INHALATION) at 07:24

## 2018-02-14 RX ADMIN — HYDROCODONE BITATRATE AND ACETAMINOPHEN PRN TAB: 10; 325 TABLET ORAL at 10:28

## 2018-02-14 RX ADMIN — MORPHINE SULFATE PRN MG: 4 INJECTION, SOLUTION INTRAMUSCULAR; INTRAVENOUS at 02:46

## 2018-02-14 RX ADMIN — LACTULOSE SCH: 20 SOLUTION ORAL at 14:06

## 2018-02-14 RX ADMIN — INSULIN DETEMIR SCH UNIT: 100 INJECTION, SOLUTION SUBCUTANEOUS at 20:25

## 2018-02-14 RX ADMIN — MORPHINE SULFATE PRN MG: 4 INJECTION, SOLUTION INTRAMUSCULAR; INTRAVENOUS at 06:20

## 2018-02-14 RX ADMIN — ALBUTEROL SULFATE SCH MG: 2.5 SOLUTION RESPIRATORY (INHALATION) at 12:45

## 2018-02-14 RX ADMIN — MORPHINE SULFATE PRN MG: 4 INJECTION, SOLUTION INTRAMUSCULAR; INTRAVENOUS at 22:15

## 2018-02-14 RX ADMIN — ASPIRIN 81 MG SCH MG: 81 TABLET ORAL at 10:26

## 2018-02-14 RX ADMIN — LACTULOSE SCH: 20 SOLUTION ORAL at 10:28

## 2018-02-14 RX ADMIN — LACTULOSE SCH: 20 SOLUTION ORAL at 23:56

## 2018-02-14 RX ADMIN — IPRATROPIUM BROMIDE SCH MG: 0.5 SOLUTION RESPIRATORY (INHALATION) at 12:46

## 2018-02-14 RX ADMIN — INSULIN DETEMIR SCH: 100 INJECTION, SOLUTION SUBCUTANEOUS at 09:00

## 2018-02-14 RX ADMIN — HYDROCODONE BITATRATE AND ACETAMINOPHEN PRN TAB: 10; 325 TABLET ORAL at 14:27

## 2018-02-14 RX ADMIN — HYDROCODONE BITATRATE AND ACETAMINOPHEN PRN TAB: 10; 325 TABLET ORAL at 20:13

## 2018-02-14 RX ADMIN — IPRATROPIUM BROMIDE SCH MG: 0.5 SOLUTION RESPIRATORY (INHALATION) at 19:22

## 2018-02-14 RX ADMIN — INSULIN ASPART SCH UNITS: 100 INJECTION, SOLUTION INTRAVENOUS; SUBCUTANEOUS at 18:11

## 2018-02-14 RX ADMIN — INSULIN ASPART SCH: 100 INJECTION, SOLUTION INTRAVENOUS; SUBCUTANEOUS at 12:00

## 2018-02-14 RX ADMIN — MORPHINE SULFATE PRN MG: 4 INJECTION, SOLUTION INTRAMUSCULAR; INTRAVENOUS at 10:28

## 2018-02-14 RX ADMIN — ALBUTEROL SULFATE SCH MG: 2.5 SOLUTION RESPIRATORY (INHALATION) at 07:25

## 2018-02-14 RX ADMIN — Medication SCH EACH: at 10:25

## 2018-02-14 RX ADMIN — INSULIN ASPART SCH UNITS: 100 INJECTION, SOLUTION INTRAVENOUS; SUBCUTANEOUS at 06:25

## 2018-02-14 RX ADMIN — ALBUTEROL SULFATE SCH MG: 2.5 SOLUTION RESPIRATORY (INHALATION) at 19:22

## 2018-02-14 RX ADMIN — BUDESONIDE SCH MG: 0.5 SUSPENSION RESPIRATORY (INHALATION) at 19:39

## 2018-02-14 RX ADMIN — BUDESONIDE SCH MG: 0.5 SUSPENSION RESPIRATORY (INHALATION) at 07:25

## 2018-02-14 RX ADMIN — MORPHINE SULFATE PRN MG: 4 INJECTION, SOLUTION INTRAMUSCULAR; INTRAVENOUS at 18:11

## 2018-02-14 RX ADMIN — INSULIN ASPART SCH: 100 INJECTION, SOLUTION INTRAVENOUS; SUBCUTANEOUS at 02:39

## 2018-02-15 RX ADMIN — ALBUTEROL SULFATE SCH MG: 2.5 SOLUTION RESPIRATORY (INHALATION) at 14:51

## 2018-02-15 RX ADMIN — ALBUTEROL SULFATE SCH: 2.5 SOLUTION RESPIRATORY (INHALATION) at 23:00

## 2018-02-15 RX ADMIN — BUDESONIDE SCH: 0.5 SUSPENSION RESPIRATORY (INHALATION) at 19:23

## 2018-02-15 RX ADMIN — ALBUTEROL SULFATE SCH MG: 2.5 SOLUTION RESPIRATORY (INHALATION) at 07:24

## 2018-02-15 RX ADMIN — MORPHINE SULFATE PRN MG: 4 INJECTION, SOLUTION INTRAMUSCULAR; INTRAVENOUS at 21:56

## 2018-02-15 RX ADMIN — ALBUTEROL SULFATE SCH: 2.5 SOLUTION RESPIRATORY (INHALATION) at 04:02

## 2018-02-15 RX ADMIN — ALBUTEROL SULFATE SCH MG: 2.5 SOLUTION RESPIRATORY (INHALATION) at 00:17

## 2018-02-15 RX ADMIN — INSULIN ASPART SCH UNITS: 100 INJECTION, SOLUTION INTRAVENOUS; SUBCUTANEOUS at 23:37

## 2018-02-15 RX ADMIN — HYDROCODONE BITATRATE AND ACETAMINOPHEN PRN TAB: 10; 325 TABLET ORAL at 05:14

## 2018-02-15 RX ADMIN — IPRATROPIUM BROMIDE SCH MG: 0.5 SOLUTION RESPIRATORY (INHALATION) at 07:24

## 2018-02-15 RX ADMIN — MORPHINE SULFATE PRN MG: 4 INJECTION, SOLUTION INTRAMUSCULAR; INTRAVENOUS at 12:00

## 2018-02-15 RX ADMIN — INSULIN ASPART SCH UNITS: 100 INJECTION, SOLUTION INTRAVENOUS; SUBCUTANEOUS at 00:05

## 2018-02-15 RX ADMIN — INSULIN ASPART SCH UNITS: 100 INJECTION, SOLUTION INTRAVENOUS; SUBCUTANEOUS at 11:33

## 2018-02-15 RX ADMIN — INSULIN ASPART SCH UNITS: 100 INJECTION, SOLUTION INTRAVENOUS; SUBCUTANEOUS at 17:49

## 2018-02-15 RX ADMIN — LACTULOSE SCH: 20 SOLUTION ORAL at 16:29

## 2018-02-15 RX ADMIN — LACTULOSE SCH: 20 SOLUTION ORAL at 10:06

## 2018-02-15 RX ADMIN — MORPHINE SULFATE PRN MG: 2 INJECTION, SOLUTION INTRAMUSCULAR; INTRAVENOUS at 12:10

## 2018-02-15 RX ADMIN — LACTULOSE SCH: 20 SOLUTION ORAL at 21:57

## 2018-02-15 RX ADMIN — IPRATROPIUM BROMIDE SCH MG: 0.5 SOLUTION RESPIRATORY (INHALATION) at 12:19

## 2018-02-15 RX ADMIN — BUDESONIDE SCH MG: 0.5 SUSPENSION RESPIRATORY (INHALATION) at 07:25

## 2018-02-15 RX ADMIN — IPRATROPIUM BROMIDE SCH: 0.5 SOLUTION RESPIRATORY (INHALATION) at 19:24

## 2018-02-15 RX ADMIN — INSULIN ASPART SCH UNITS: 100 INJECTION, SOLUTION INTRAVENOUS; SUBCUTANEOUS at 06:52

## 2018-02-15 RX ADMIN — IPRATROPIUM BROMIDE SCH MG: 0.5 SOLUTION RESPIRATORY (INHALATION) at 00:16

## 2018-02-15 RX ADMIN — ASPIRIN 81 MG SCH MG: 81 TABLET ORAL at 09:54

## 2018-02-15 RX ADMIN — INSULIN DETEMIR SCH UNITS: 100 INJECTION, SOLUTION SUBCUTANEOUS at 11:32

## 2018-02-15 RX ADMIN — ALBUTEROL SULFATE SCH MG: 2.5 SOLUTION RESPIRATORY (INHALATION) at 12:19

## 2018-02-15 RX ADMIN — INSULIN DETEMIR SCH: 100 INJECTION, SOLUTION SUBCUTANEOUS at 21:58

## 2018-02-15 RX ADMIN — MORPHINE SULFATE PRN MG: 4 INJECTION, SOLUTION INTRAMUSCULAR; INTRAVENOUS at 03:22

## 2018-02-15 RX ADMIN — PANTOPRAZOLE SODIUM SCH MG: 40 TABLET, DELAYED RELEASE ORAL at 09:54

## 2018-02-15 RX ADMIN — ALBUTEROL SULFATE SCH: 2.5 SOLUTION RESPIRATORY (INHALATION) at 19:23

## 2018-02-15 RX ADMIN — NIFEDIPINE SCH MG: 30 TABLET, FILM COATED, EXTENDED RELEASE ORAL at 09:53

## 2018-02-15 RX ADMIN — Medication SCH EACH: at 09:54

## 2018-02-15 NOTE — GENERAL PROGRESS NOTE
Subjective





- Review of Systems


Events since last encounter: 





awake in no distress 





Objective





- Results


Result Diagrams: 


 18 05:25





 18 05:25


Recent Labs: 


 Laboratory Last Values











WBC  9.1 Th/cmm (4.8-10.8)   18  05:25    


 


RBC  3.88 Mil/cmm (4.30-5.70)  L  18  05:25    


 


Hgb  11.5 gm/dL (12-16)  L  18  05:25    


 


Hct  33.3 % (41.0-60)  L  18  05:25    


 


MCV  85.9 fl (80-99)   18  05:25    


 


MCH  29.5 pg (26.0-30.0)   18  05:25    


 


MCHC Differential  34.4 pg (28.0-36.0)   18  05:25    


 


RDW  13.1 % (11.5-20.0)   18  05:25    


 


Plt Count  373 Th/cmm (150-400)   18  05:25    


 


MPV  7.4 fl  18  05:25    


 


Neutrophils %  64.9 % (40.0-80.0)   18  05:25    


 


Lymphocytes %  18.3 % (20.0-50.0)  L  18  05:25    


 


Monocytes %  13.2 % (2.0-10.0)  H  18  05:25    


 


Eosinophils %  2.7 % (0.0-5.0)   18  05:25    


 


Basophils %  0.9 % (0.0-2.0)   18  05:25    


 


PT  9.8 SECONDS (9.5-11.5)   18  09:18    


 


INR  0.94  (0.5-1.4)   18  09:18    


 


PTT (Actin FS)  26.1 SECONDS (26.0-38.0)   18  09:18    


 


Specimen Source  Arterial   18  10:54    


 


Sample Site  Right Radial   18  10:54    


 


pH  7.41  (7.35-7.45)   18  10:54    


 


pCO2  43.0 mmHg (35.0-45.0)   18  10:54    


 


pO2  60.0 mmHg (80.0-100.0)  L  18  10:54    


 


HCO3  26.6 mEq/L (20.0-26.0)  H  18  10:54    


 


Base Excess  2.3 mEq/L (-3.0-3.0)   18  10:54    


 


O2 Saturation  91.0 % (92.0-100.0)  L  18  10:54    


 


Glen Test  Positive   18  10:54    


 


Vent Rate  NA   18  10:54    


 


Inspired O2  21   18  10:54    


 


Tidal Volume  NA   18  10:54    


 


PEEP  NA   18  10:54    


 


Pressure (ins/psv/peep)  NA   18  10:54    


 


Critical Value  LZHANG   18  10:54    


 


Sodium  134 mEq/L (136-145)  L  18  05:25    


 


Potassium  3.5 mEq/L (3.5-5.1)   18  05:25    


 


Chloride  100 mEq/L ()   18  05:25    


 


Carbon Dioxide  26.1 mEq/L (21.0-31.0)   18  05:25    


 


Anion Gap  11.4  (7.0-16.0)   18  05:25    


 


BUN  14 mg/dL (7-25)   18  05:25    


 


Creatinine  0.6 mg/dL (0.7-1.3)  L  18  05:25    


 


Est GFR ( Amer)  > 60.0 ml/min (>90)   18  05:25    


 


Est GFR (Non-Af Amer)  > 60.0 ml/min  18  05:25    


 


BUN/Creatinine Ratio  23.3   18  05:25    


 


Glucose  158 mg/dL ()  H D 18  05:25    


 


POC Glucose  258 MG/DL (70 - 105)  H  02/15/18  06:40    


 


Hemoglobin A1c %  10.0 % (4.0-6.0)  H  18  20:45    


 


Calcium  8.7 mg/dL (8.6-10.3)   18  05:25    


 


Total Bilirubin  0.3 mg/dL (0.3-1.0)   18  05:30    


 


Direct Bilirubin  0.04 mg/dL (0.0-0.2)   18  06:45    


 


AST  29 U/L (13-39)   18  05:30    


 


ALT  33 U/L (7-52)   18  05:30    


 


Alkaline Phosphatase  51 U/L ()   18  05:30    


 


Ammonia  65 umol/L (16-53)  H  18  06:45    


 


Total Protein  6.7 gm/dL (6.0-8.3)   18  05:30    


 


Albumin  3.7 gm/dL (4.2-5.5)  L  18  05:30    


 


Globulin  3.0 gm/dL  18  05:30    


 


Albumin/Globulin Ratio  1.2  (1.0-1.8)   18  05:30    


 


Free T3  2.7 pg/mL (2.0-4.4)   18  06:45    


 


Urine Source  RANDOM   18  01:40    


 


Urine Color  ORANGE   18  01:40    


 


Urine Clarity  CLEAR  (CLEAR)   18  01:40    


 


Urine pH  6.0  (4.6 - 8.0)   18  01:40    


 


Ur Specific Gravity  >= 1.030  (1.005-1.030)   18  01:40    


 


Urine Protein  30 mg/dL (NEGATIVE)  H  18  01:40    


 


Urine Glucose (UA)  100 mg/dL (NEGATIVE)  H  18  01:40    


 


Urine Ketones  NEGATIVE mg/dL (NEGATIVE)   18  01:40    


 


Urine Blood  NEGATIVE  (NEGATIVE)   18  01:40    


 


Urine Nitrate  NEGATIVE  (NEGATIVE)   18  01:40    


 


Urine Bilirubin  NEGATIVE  (NEGATIVE)   18  01:40    


 


Urine Urobilinogen  0.2 E.U./dL (0.2 - 1.0)   18  01:40    


 


Ur Leukocyte Esterase  NEGATIVE  (NEGATIVE)   18  01:40    


 


Urine RBC  0-2 /hpf (0-5)  H  18  01:40    


 


Urine WBC  2-5 /hpf (0-5)   18  01:40    


 


Ur Epithelial Cells  MODERATE /lpf (FEW)   18  01:40    


 


Urine Bacteria  FEW /hpf (NONE SEEN)   18  01:40    


 


Hyaline Casts  2-5 /lpf (0-2)  H  18  01:40    


 


Urine Mucus  MODERATE /lpf (FEW)   18  01:40    


 


Blood Type  O POSITIVE   18  00:50    


 


Antibody Screen  NEGATIVE   18  00:50    


 


Crossmatch  See Detail   18  00:50    














- Physical Exam


Vitals and I&O: 


 Vital Signs











Temp  97 F   02/15/18 04:00


 


Pulse  97   02/15/18 14:53


 


Resp  18   02/15/18 14:53


 


BP  169/97   02/15/18 09:55


 


Pulse Ox  96   02/15/18 14:53








 Intake & Output











 02/14/18 02/15/18 02/15/18





 18:59 06:59 18:59


 


Intake Total 1960 450 


 


Balance 1960 450 


 


Weight (lbs) 124.738 kg 124.284 kg 


 


Intake:   


 


  Intake, IV Amount 1000  


 


    Sodium Chloride 0.45% 1, 1000  





    000 ml @ 60 mls/hr IV .   





    [id number] [facility] Rx#:[id number]   


 


  Oral 960 450 


 


Other:   


 


  # Voids 4 3 


 


  # Bowel Movements 2 2 


 


  Stool Characteristics Soft  Soft











Active Medications: 


Current Medications





Acetaminophen (Tylenol)  650 mg PO Q4HR PRN


   PRN Reason: PAIN OR FEVER >100.4


   Stop: 18 20:10


   Last Admin: 18 23:59 Dose:  650 mg


Albuterol Sulfate (Albuterol 2.5mg/3ml Neb Ud)  2.5 mg HHN Q4HRT [facility]


   Stop: 18 02:59


   Last Admin: 02/15/18 14:51 Dose:  2.5 mg


Aspirin (Aspirin Chewable)  81 mg PO DAILY [facility]


   Stop: 18 08:59


   Last Admin: 02/15/18 09:54 Dose:  81 mg


Benzocaine/Menthol (Cepacol)  1 josiah MM Q4H PRN


   PRN Reason: Sore Throat


Bisacodyl (Dulcolax 10 Mg Supp)  10 mg RC DAILY PRN


   PRN Reason: Constipation


   Stop: 18 20:10


Budesonide (Pulmicort)  0.5 mg HHN BIDRT [facility]


   Stop: 18 06:59


   Last Admin: 02/15/18 07:25 Dose:  0.5 mg


Camphor/Menthol (Bengay Greaseless 10%-15%)  1 appl TP Q4H PRN


   PRN Reason: LEFT SHOULDER AND LOWER PAIN


Clopidogrel Bisulfate (Plavix)  75 mg PO DAILY [facility]


   Stop: 18 08:59


   Last Admin: 02/15/18 09:55 Dose:  75 mg


Docusate Sodium (Colace)  100 mg PO DAILY PRN


   PRN Reason: Constipation


   Stop: 18 08:59


Famotidine (Pepcid)  20 mg PO BID [facility]


   Stop: 18 08:59


   Last Admin: 02/15/18 09:55 Dose:  20 mg


Furosemide (Lasix)  40 mg PO DAILY [facility]


   Stop: 18 08:59


   Last Admin: 02/15/18 09:55 Dose:  40 mg


Gabapentin (Neurontin)  600 mg PO BID [facility]


   Stop: 18 08:59


   Last Admin: 02/15/18 09:53 Dose:  600 mg


Gemfibrozil (Lopid)  600 mg PO BID [facility]


   Stop: 18 08:59


   Last Admin: 02/15/18 09:54 Dose:  600 mg


Glucagon (Glucagen)  1 mg IM PRN PRN


   PRN Reason: IF BS <70


Guaifenesin/Codeine Phosphate (Robitussin Ac)  10 ml PO Q6HR PRN


   PRN Reason: Allergy Symptoms


   Stop: 18 19:16


Hydralazine HCl (Apresoline)  50 mg PO TID [facility]


   Stop: 18 20:59


   Last Admin: 02/15/18 09:52 Dose:  50 mg


Azithromycin 500 mg/ Sodium (Chloride)  250 mls @ 250 mls/hr IV Q24H [facility]


   Stop: 18 17:59


   Last Admin: 18 18:57 Dose:  250 mls/hr


Sodium Chloride (Nacl 0.45%)  1,000 mls @ 60 mls/hr IV .[id number] [facility]


   Stop: 18 00:29


   Last Admin: 02/15/18 06:37 Dose:  60 mls/hr


Insulin Aspart (Novolog Insulin Sliding Scale)  0 units SUBQ Q6HR [facility]


   PRN Reason: Protocol


   Stop: 18 05:59


   Last Admin: 02/15/18 11:33 Dose:  2 units


Insulin Detemir (Levemir Insulin)  15 units SUBQ QAM [facility]


   Stop: 18 08:59


   Last Admin: 02/15/18 11:32 Dose:  15 units


Insulin Detemir (Levemir Insulin)  30 units SUBQ HS [facility]


   Stop: 18 20:59


   Last Admin: 18 20:25 Dose:  30 unit


Ipratropium Bromide (Atrovent Neb 0.5mg/2.5ml)  0.5 mg HHN Q6HRT [facility]


   Stop: 18 00:59


   Last Admin: 02/15/18 12:19 Dose:  0.5 mg


Lactobacillus Rhamnosus (Culturelle 15b)  1 each PO DAILY [facility]


   Stop: 04/15/18 08:59


   Last Admin: 02/15/18 09:54 Dose:  1 each


Lactulose (Cephulac)  20 gm PO TID [facility]


   Stop: 18 20:59


   Last Admin: 02/15/18 10:06 Dose:  Not Given


Lorazepam (Ativan)  1 mg IVP Q6HR PRN; Protocol


   PRN Reason: Agitation


   Last Admin: 02/15/18 01:15 Dose:  1 mg


Losartan Potassium (Cozaar)  100 mg PO DAILY [facility]


   Stop: 18 08:59


   Last Admin: 02/15/18 09:53 Dose:  100 mg


Magnesium Hydroxide (Milk Of Magnesia)  30 ml PO DAILY PRN


   PRN Reason: Constipation


   Stop: 18 20:10


Metformin HCl (Glucophage)  850 mg PO BID [facility]


   Stop: 18 08:59


   Last Admin: 02/15/18 10:07 Dose:  850 mg


Methocarbamol (Robaxin)  500 mg PO Q6H [facility]


   Stop: 18 20:14


   Last Admin: 02/15/18 08:15 Dose:  Not Given


Miscellaneous (Probiotic Screen)  1 ea MC PRN PRN


   PRN Reason: PROTOCOL


   Stop: 18 16:29


Morphine Sulfate (Morphine)  2 mg IVP Q4HR PRN


   PRN Reason: Pain (Moderate)


   Stop: 04/10/18 20:48


   Last Admin: 02/15/18 12:10 Dose:  2 mg


Morphine Sulfate (Morphine)  4 mg IVP Q4H PRN


   PRN Reason: Pain (Severe)


   Stop: 04/10/18 20:49


   Last Admin: 02/15/18 12:00 Dose:  2 mg


Multivitamins/Vitamin C (Theragran)  1 tab PO DAILY [facility]


   Stop: 18 08:59


   Last Admin: 02/15/18 09:55 Dose:  1 tab


Nifedipine (Procardia Xl)  60 mg PO DAILY [facility]


   Stop: 18 08:59


   Last Admin: 02/15/18 09:53 Dose:  60 mg


Ondansetron HCl (Zofran)  4 mg IV Q6H PRN


   PRN Reason: Nausea / Vomiting


   Stop: 04/10/18 20:50


Pantoprazole Sodium (Protonix)  40 mg PO DAILY [facility]


   Stop: 18 08:59


   Last Admin: 02/15/18 09:54 Dose:  40 mg


Rivaroxaban (Xarelto)  10 mg PO DAILY [facility]


   Stop: 18 15:59


   Last Admin: 02/15/18 09:52 Dose:  10 mg


Sodium Phosphate (Fleet Enema)  135 ml RC DAILY PRN


   PRN Reason: IF MOM OR DULCOLAX INEFFECTIVE


   Stop: 18 20:10











- Procedures


Procedures: 


 Procedures











Procedure Code Date


 


EXCISION OF DUODENUM, ENDO, DIAGN [id number] 17


 


EXCISION OF STOMACH, ENDO, DIAGN [id number] 17


 


REPOSITION LEFT UPPER FEMUR WITH INT FIX, OPEN APPROACH [id number] 18


 


TREAT THIGH FRACTURE 16177 18














Assessment/Plan





- Assessment


Assessment: 





Early pna


acute bronchitis


generalized weakness


congestion





HTN


 DM


 CAD


 Asthma/COPD


hx CVA/TIA w/ left side weakness





- Plan


Plan: 





cardiology consultation


ivabx


accucheck with sliding scale 


contnue current orders 





Nutritional Asmnt/Malnutr-PDOC





- Dietary Evaluation


Malnutrition Findings (Please click <Entered> for more info): 








Nutritional Asmnt/Malnutrition                             Start:  18 18:

11


Text:                                                      Status: Complete    

  


Freq:                                                                          

  


 Document     18 18:11  LUCRETIA  (Rec: 18 18:18  LUCRETIA  LIDA-FNS1)


 Nutritional Asmnt/Malnutrition


     Patient General Information


      Nutritional Screening                      High Risk


      Diagnosis                                  early PNA, bronchitis


      Pertinent Medical Hx/Surgical Hx           HTN, DM, CAD, asthma/COPD, CVA


                                                 /TIA, PUD/GERD, appndectomy,


                                                 laparotomy, R tibial fracture


      Subjective Information                     Pt seen lying in bed at time


                                                 of visit, awake and alert. Pt


                                                 reported good appetite, the


                                                 food was small portion for him


                                                 .


      Current Diet Order/ Nutrition Support      low sodium


      Pertinent Medications                      lasix, novolog


      Pertinent Labs                              Cr 0.6, glucose 290, POC


                                                 276-314


                                                 2/ A1c 10.0


     Nutritional Hx/Data


      Height                                     1.8 m


      Height (Calculated Centimeters)            180.3


      Current Weight (lbs)                       124.284 kg


      Weight (Calculated Kilograms)              124.3


      Weight (Calculated Grams)                  340046.3


      Ideal Body Weight                          166


      % Ideal Body Weight                        165


      Body Mass Index (BMI)                      38.2


      Weight Status                              Obese


     GI Symptoms


      GI Symptoms                                None


      Last BM                                    2


      Difficult in:                              None


      Usual diet at home                         pt stated he did not follow


                                                 any diet restrictions. He have


                                                 candy bars, chips everyday.


                                                 BS was stayed around 300.


      Skin Integrity/Comment:                    intact


     Estimated Nutritional Goals


      BEE in Kcals:                              Adj wt of IBW


      Calories/Kcals/Kg                          25-30


      Kcals Calculated                           9506-6387


      Protein:                                   Adj wt of IBW


      Protein g/k


      Protein Calculated                         88


      Fluid: ml                                  2200-2640ml (1ml/kcal)


     Nutritional Problem


      1. Problem


       Problem                                   altered nutrition related lab


                                                 values


       Etiology                                  hx of DM, excessive


                                                 carborhydrates intake


       Signs/Symptoms:                           glucose 290, -314


     Malnutrition Alert


      Protein-Calorie Malnutrition               N/A


      Is there a minimum of two criteria         No


       selected?                                 


       Query Text:Check all the applicable       


       criteria. A minimum of two criteria are   


       recommended for diagnosis of either       


       severe or non-severe malnutrition.        


     Intervention/Recommendation


      Comments                                   1. Recommend CCHO-60gm, double


                                                 protein and veggetables for


                                                 optimal glycemic control. RN


                                                 notified.


                                                 2. Diabetes education provide.


                                                 Pt showed interest.


                                                 3. Monitor PO intake, wt, labs


                                                 and skin integrity


                                                 4. F/U as moderate risk in 3-5


                                                 days, 2/10-


     Expected Outcomes/Goals


      Expected Outcomes/Goals                    1. PO intake to meet at least


                                                 75% of nutritional needs.


                                                 2. Wt stability, skin to


                                                 remain intact, labs to


                                                 approach WNL.

## 2018-02-15 NOTE — PROGRESS NOTES
DATE:  02/14/2018



PULMONARY PROGRESS NOTE



PROBLEM LIST:

1.  Acute exacerbation of chronic obstructive pulmonary disease.

2.  Fractured left hip, status post repair, second day.

3.  Suspect obstructive sleep apnea syndrome.

4.  History of severe morbid obesity with metabolic disorder.



SYMPTOMS:  Nil except for pain.  No specific new other symptoms, etc.



PHYSICAL EXAMINATION:

VITAL SIGNS:  T-max 98.4, blood pressure 157/85, saturation 98.

NECK:  Veins not visualized.

CHEST:  Shows occasional wheezing with diminished air entry.

HEART:  Regular.

ABDOMEN:  Soft, nontender.

EXTREMITIES:  Shows no localized tenderness.



LABORATORY DATA:  The patient's white count is 9000, hemoglobin 11.5. 

Electrolytes:  Potassium is 3.5.



ASSESSMENT:  The patient is clinically stable respiratory wise.



PLANS AND SUGGESTIONS:  We will go ahead and continue current respiratory care,

inhalation treatment, might require acute rehabilitation and go from there.





DD: 02/14/2018 19:06

DT: 02/15/2018 02:39

JOB# [id number]  [id number]

## 2018-02-15 NOTE — GENERAL PROGRESS NOTE
Subjective





- Review of Systems


Service Date: 18


Subjective: 





Modest pain left hip.  Surgery yesterday.





Markos Samayoa M.D.





Objective





- Results


Result Diagrams: 


 18 05:25





 18 05:25


Recent Labs: 


 Laboratory Last Values











WBC  9.1 Th/cmm (4.8-10.8)   18  05:25    


 


RBC  3.88 Mil/cmm (4.30-5.70)  L  18  05:25    


 


Hgb  11.5 gm/dL (12-16)  L  18  05:25    


 


Hct  33.3 % (41.0-60)  L  18  05:25    


 


MCV  85.9 fl (80-99)   18  05:25    


 


MCH  29.5 pg (26.0-30.0)   18  05:25    


 


MCHC Differential  34.4 pg (28.0-36.0)   18  05:25    


 


RDW  13.1 % (11.5-20.0)   18  05:25    


 


Plt Count  373 Th/cmm (150-400)   18  05:25    


 


MPV  7.4 fl  18  05:25    


 


Neutrophils %  64.9 % (40.0-80.0)   18  05:25    


 


Lymphocytes %  18.3 % (20.0-50.0)  L  18  05:25    


 


Monocytes %  13.2 % (2.0-10.0)  H  18  05:25    


 


Eosinophils %  2.7 % (0.0-5.0)   18  05:25    


 


Basophils %  0.9 % (0.0-2.0)   18  05:25    


 


PT  9.8 SECONDS (9.5-11.5)   18  09:18    


 


INR  0.94  (0.5-1.4)   18  09:18    


 


PTT (Actin FS)  26.1 SECONDS (26.0-38.0)   18  09:18    


 


Specimen Source  Arterial   18  10:54    


 


Sample Site  Right Radial   18  10:54    


 


pH  7.41  (7.35-7.45)   18  10:54    


 


pCO2  43.0 mmHg (35.0-45.0)   18  10:54    


 


pO2  60.0 mmHg (80.0-100.0)  L  18  10:54    


 


HCO3  26.6 mEq/L (20.0-26.0)  H  18  10:54    


 


Base Excess  2.3 mEq/L (-3.0-3.0)   18  10:54    


 


O2 Saturation  91.0 % (92.0-100.0)  L  18  10:54    


 


Glen Test  Positive   18  10:54    


 


Vent Rate  NA   18  10:54    


 


Inspired O2  21   18  10:54    


 


Tidal Volume  NA   18  10:54    


 


PEEP  NA   18  10:54    


 


Pressure (ins/psv/peep)  NA   18  10:54    


 


Critical Value  LZHANG   18  10:54    


 


Sodium  134 mEq/L (136-145)  L  18  05:25    


 


Potassium  3.5 mEq/L (3.5-5.1)   18  05:25    


 


Chloride  100 mEq/L ()   18  05:25    


 


Carbon Dioxide  26.1 mEq/L (21.0-31.0)   18  05:25    


 


Anion Gap  11.4  (7.0-16.0)   18  05:25    


 


BUN  14 mg/dL (7-25)   18  05:25    


 


Creatinine  0.6 mg/dL (0.7-1.3)  L  18  05:25    


 


Est GFR ( Amer)  > 60.0 ml/min (>90)   18  05:25    


 


Est GFR (Non-Af Amer)  > 60.0 ml/min  18  05:25    


 


BUN/Creatinine Ratio  23.3   18  05:25    


 


Glucose  158 mg/dL ()  H D 18  05:25    


 


POC Glucose  213 MG/DL (70 - 105)  H  02/15/18  16:23    


 


Hemoglobin A1c %  10.0 % (4.0-6.0)  H  18  20:45    


 


Calcium  8.7 mg/dL (8.6-10.3)   18  05:25    


 


Total Bilirubin  0.3 mg/dL (0.3-1.0)   18  05:30    


 


Direct Bilirubin  0.04 mg/dL (0.0-0.2)   18  06:45    


 


AST  29 U/L (13-39)   18  05:30    


 


ALT  33 U/L (7-52)   18  05:30    


 


Alkaline Phosphatase  51 U/L ()   18  05:30    


 


Ammonia  65 umol/L (16-53)  H  18  06:45    


 


Total Protein  6.7 gm/dL (6.0-8.3)   18  05:30    


 


Albumin  3.7 gm/dL (4.2-5.5)  L  18  05:30    


 


Globulin  3.0 gm/dL  18  05:30    


 


Albumin/Globulin Ratio  1.2  (1.0-1.8)   18  05:30    


 


Free T3  2.7 pg/mL (2.0-4.4)   18  06:45    


 


Urine Source  RANDOM   18  01:40    


 


Urine Color  ORANGE   18  01:40    


 


Urine Clarity  CLEAR  (CLEAR)   18  01:40    


 


Urine pH  6.0  (4.6 - 8.0)   18  01:40    


 


Ur Specific Gravity  >= 1.030  (1.005-1.030)   18  01:40    


 


Urine Protein  30 mg/dL (NEGATIVE)  H  18  01:40    


 


Urine Glucose (UA)  100 mg/dL (NEGATIVE)  H  18  01:40    


 


Urine Ketones  NEGATIVE mg/dL (NEGATIVE)   18  01:40    


 


Urine Blood  NEGATIVE  (NEGATIVE)   18  01:40    


 


Urine Nitrate  NEGATIVE  (NEGATIVE)   18  01:40    


 


Urine Bilirubin  NEGATIVE  (NEGATIVE)   18  01:40    


 


Urine Urobilinogen  0.2 E.U./dL (0.2 - 1.0)   18  01:40    


 


Ur Leukocyte Esterase  NEGATIVE  (NEGATIVE)   18  01:40    


 


Urine RBC  0-2 /hpf (0-5)  H  18  01:40    


 


Urine WBC  2-5 /hpf (0-5)   18  01:40    


 


Ur Epithelial Cells  MODERATE /lpf (FEW)   18  01:40    


 


Urine Bacteria  FEW /hpf (NONE SEEN)   18  01:40    


 


Hyaline Casts  2-5 /lpf (0-2)  H  18  01:40    


 


Urine Mucus  MODERATE /lpf (FEW)   18  01:40    


 


Blood Type  O POSITIVE   18  00:50    


 


Antibody Screen  NEGATIVE   18  00:50    


 


Crossmatch  See Detail   18  00:50    














- Physical Exam


Vitals and I&O: 


 Vital Signs











Temp  98.7 F   02/15/18 20:08


 


Pulse  111   02/15/18 20:08


 


Resp  18   02/15/18 20:08


 


BP  147/87   02/15/18 20:08


 


Pulse Ox  94   02/15/18 20:08








 Intake & Output











 02/15/18 02/15/18 02/16/18





 06:59 18:59 06:59


 


Intake Total 700 800 


 


Output Total  1500 


 


Balance 700 -700 


 


Weight (lbs) 124.284 kg 124.284 kg 


 


Intake:   


 


  Intake, IV Amount 250  


 


    Azithromycin 500 mg In 250  





    Sodium Chloride 0.9% 250   





    ml @ 250 mls/hr IV Q24H   





    [facility] Rx#:[id number]   


 


  Oral 450 800 


 


Output:   


 


  Urine  1500 


 


Other:   


 


  # Voids 3  


 


  # Bowel Movements 2  


 


  Stool Characteristics  Soft 











Active Medications: 


Current Medications





Acetaminophen (Tylenol)  650 mg PO Q4HR PRN


   PRN Reason: PAIN OR FEVER >100.4


   Stop: 18 20:10


   Last Admin: 02/15/18 17:54 Dose:  650 mg


Albuterol Sulfate (Albuterol 2.5mg/3ml Neb Ud)  2.5 mg HHN Q4HRT [facility]


   Stop: 18 02:59


   Last Admin: 02/15/18 19:23 Dose:  Not Given


Aspirin (Aspirin Chewable)  81 mg PO DAILY [facility]


   Stop: 18 08:59


   Last Admin: 02/15/18 09:54 Dose:  81 mg


Benzocaine/Menthol (Cepacol)  1 josiah MM Q4H PRN


   PRN Reason: Sore Throat


Bisacodyl (Dulcolax 10 Mg Supp)  10 mg RC DAILY PRN


   PRN Reason: Constipation


   Stop: 18 20:10


Budesonide (Pulmicort)  0.5 mg HHN BIDRT [facility]


   Stop: 18 06:59


   Last Admin: 02/15/18 19:23 Dose:  Not Given


Camphor/Menthol (Bengay Greaseless 10%-15%)  1 appl TP Q4H PRN


   PRN Reason: LEFT SHOULDER AND LOWER PAIN


Clopidogrel Bisulfate (Plavix)  75 mg PO DAILY [facility]


   Stop: 18 08:59


   Last Admin: 02/15/18 09:55 Dose:  75 mg


Docusate Sodium (Colace)  100 mg PO DAILY PRN


   PRN Reason: Constipation


   Stop: 18 08:59


Famotidine (Pepcid)  20 mg PO BID [facility]


   Stop: 18 08:59


   Last Admin: 02/15/18 16:28 Dose:  20 mg


Furosemide (Lasix)  40 mg PO DAILY [facility]


   Stop: 18 08:59


   Last Admin: 02/15/18 09:55 Dose:  40 mg


Gabapentin (Neurontin)  600 mg PO BID [facility]


   Stop: 18 08:59


   Last Admin: 02/15/18 16:25 Dose:  600 mg


Gemfibrozil (Lopid)  600 mg PO BID [facility]


   Stop: 18 08:59


   Last Admin: 02/15/18 16:26 Dose:  600 mg


Glucagon (Glucagen)  1 mg IM PRN PRN


   PRN Reason: IF BS <70


Guaifenesin/Codeine Phosphate (Robitussin Ac)  10 ml PO Q6HR PRN


   PRN Reason: Allergy Symptoms


   Stop: 18 19:16


Hydralazine HCl (Apresoline)  50 mg PO TID [facility]


   Stop: 18 20:59


   Last Admin: 02/15/18 16:26 Dose:  50 mg


Azithromycin 500 mg/ Sodium (Chloride)  250 mls @ 250 mls/hr IV Q24H [facility]


   Stop: 18 17:59


   Last Admin: 02/15/18 17:55 Dose:  250 mls/hr


Insulin Aspart (Novolog Insulin Sliding Scale)  0 units SUBQ Q6HR [facility]


   PRN Reason: Protocol


   Stop: 18 05:59


   Last Admin: 02/15/18 17:49 Dose:  4 units


Insulin Detemir (Levemir Insulin)  15 units SUBQ QAM [facility]


   Stop: 18 08:59


   Last Admin: 02/15/18 11:32 Dose:  15 units


Insulin Detemir (Levemir Insulin)  30 units SUBQ HS [facility]


   Stop: 18 20:59


   Last Admin: 18 20:25 Dose:  30 unit


Ipratropium Bromide (Atrovent Neb 0.5mg/2.5ml)  0.5 mg HHN Q6HRT [facility]


   Stop: 18 00:59


   Last Admin: 02/15/18 19:24 Dose:  Not Given


Lactobacillus Rhamnosus (Culturelle 15b)  1 each PO DAILY [facility]


   Stop: 04/15/18 08:59


   Last Admin: 02/15/18 09:54 Dose:  1 each


Lactulose (Cephulac)  20 gm PO TID [facility]


   Stop: 18 20:59


   Last Admin: 02/15/18 16:29 Dose:  Not Given


Lorazepam (Ativan)  1 mg IVP Q6HR PRN; Protocol


   PRN Reason: Agitation


   Last Admin: 02/15/18 01:15 Dose:  1 mg


Losartan Potassium (Cozaar)  100 mg PO DAILY [facility]


   Stop: 18 08:59


   Last Admin: 02/15/18 09:53 Dose:  100 mg


Magnesium Hydroxide (Milk Of Magnesia)  30 ml PO DAILY PRN


   PRN Reason: Constipation


   Stop: 18 20:10


Metformin HCl (Glucophage)  850 mg PO BID [facility]


   Stop: 18 08:59


   Last Admin: 02/15/18 16:27 Dose:  850 mg


Methocarbamol (Robaxin)  500 mg PO Q6H [facility]


   Stop: 18 20:14


   Last Admin: 02/15/18 16:25 Dose:  500 mg


Miscellaneous (Probiotic Screen)  1 ea MC PRN PRN


   PRN Reason: PROTOCOL


   Stop: 18 16:29


Morphine Sulfate (Morphine)  2 mg IVP Q4HR PRN


   PRN Reason: Pain (Moderate)


   Stop: 04/10/18 20:48


   Last Admin: 02/15/18 12:10 Dose:  2 mg


Morphine Sulfate (Morphine)  4 mg IVP Q4H PRN


   PRN Reason: Pain (Severe)


   Stop: 04/10/18 20:49


   Last Admin: 02/15/18 12:00 Dose:  2 mg


Multivitamins/Vitamin C (Theragran)  1 tab PO DAILY [facility]


   Stop: 18 08:59


   Last Admin: 02/15/18 09:55 Dose:  1 tab


Nifedipine (Procardia Xl)  60 mg PO DAILY [facility]


   Stop: 18 08:59


   Last Admin: 02/15/18 09:53 Dose:  60 mg


Ondansetron HCl (Zofran)  4 mg IV Q6H PRN


   PRN Reason: Nausea / Vomiting


   Stop: 04/10/18 20:50


Pantoprazole Sodium (Protonix)  40 mg PO DAILY [facility]


   Stop: 18 08:59


   Last Admin: 02/15/18 09:54 Dose:  40 mg


Rivaroxaban (Xarelto)  10 mg PO DAILY [facility]


   Stop: 18 15:59


   Last Admin: 02/15/18 09:52 Dose:  10 mg


Sodium Phosphate (Fleet Enema)  135 ml RC DAILY PRN


   PRN Reason: IF MOM OR DULCOLAX INEFFECTIVE


   Stop: 18 20:10











- Procedures


Procedures: 


 Procedures











Procedure Code Date


 


EXCISION OF DUODENUM, ENDO, DIAGN [id number] 17


 


EXCISION OF STOMACH, ENDO, DIAGN [id number] 17


 


REPOSITION LEFT UPPER FEMUR WITH INT FIX, OPEN APPROACH 7BN456M 18


 


TREAT THIGH FRACTURE 20788 18














Nutritional Asmnt/Malnutr-PDOC





- Dietary Evaluation


Malnutrition Findings (Please click <Entered> for more info): 








Nutritional Asmnt/Malnutrition                             Start:  18 18:

11


Text:                                                      Status: Complete    

  


Freq:                                                                          

  


 Document     18 18:11  [facility]  (Rec: 18 18:18  HENG  [id number])


 Nutritional Asmnt/Malnutrition


     Patient General Information


      Nutritional Screening                      High Risk


      Diagnosis                                  early PNA, bronchitis


      Pertinent Medical Hx/Surgical Hx           HTN, DM, CAD, asthma/COPD, CVA


                                                 /TIA, PUD/GERD, appndectomy,


                                                 laparotomy, R tibial fracture


      Subjective Information                     Pt seen lying in bed at time


                                                 of visit, awake and alert. Pt


                                                 reported good appetite, the


                                                 food was small portion for him


                                                 .


      Current Diet Order/ Nutrition Support      low sodium


      Pertinent Medications                      lasix, novolog


      Pertinent Labs                             2/7 Cr 0.6, glucose 290, POC


                                                 276-314


                                                 2/6 A1c 10.0


     Nutritional Hx/Data


      Height                                     1.8 m


      Height (Calculated Centimeters)            180.3


      Current Weight (lbs)                       124.284 kg


      Weight (Calculated Kilograms)              124.3


      Weight (Calculated Grams)                  469442.3


      Ideal Body Weight                          166


      % Ideal Body Weight                        165


      Body Mass Index (BMI)                      38.2


      Weight Status                              Obese


     GI Symptoms


      GI Symptoms                                None


      Last BM                                    2


      Difficult in:                              None


      Usual diet at home                         pt stated he did not follow


                                                 any diet restrictions. He have


                                                 candy bars, chips everyday.


                                                 BS was stayed around 300.


      Skin Integrity/Comment:                    intact


     Estimated Nutritional Goals


      BEE in Kcals:                              Adj wt of IBW


      Calories/Kcals/Kg                          25-30


      Kcals Calculated                           5078-2724


      Protein:                                   Adj wt of IBW


      Protein g/k


      Protein Calculated                         88


      Fluid: ml                                  2200-2640ml (1ml/kcal)


     Nutritional Problem


      1. Problem


       Problem                                   altered nutrition related lab


                                                 values


       Etiology                                  hx of DM, excessive


                                                 carborhydrates intake


       Signs/Symptoms:                           glucose 290, -314


     Malnutrition Alert


      Protein-Calorie Malnutrition               N/A


      Is there a minimum of two criteria         No


       selected?                                 


       Query Text:Check all the applicable       


       criteria. A minimum of two criteria are   


       recommended for diagnosis of either       


       severe or non-severe malnutrition.        


     Intervention/Recommendation


      Comments                                   1. Recommend CCHO-60gm, double


                                                 protein and veggetables for


                                                 optimal glycemic control. RN


                                                 notified.


                                                 2. Diabetes education provide.


                                                 Pt showed interest.


                                                 3. Monitor PO intake, wt, labs


                                                 and skin integrity


                                                 4. F/U as moderate risk in 3-5


                                                 days, 2/10-


     Expected Outcomes/Goals


      Expected Outcomes/Goals                    1. PO intake to meet at least


                                                 75% of nutritional needs.


                                                 2. Wt stability, skin to


                                                 remain intact, labs to


                                                 approach WNL.

## 2018-02-16 LAB
ALBUMIN SERPL-MCNC: 3.3 GM/DL (ref 4.2–5.5)
ALBUMIN/GLOB SERPL: 0.9 {RATIO} (ref 1–1.8)
ALP SERPL-CCNC: 69 U/L (ref 34–104)
ALT SERPL-CCNC: 38 U/L (ref 7–52)
ANION GAP SERPL CALC-SCNC: 13.8 MMOL/L (ref 7–16)
AST SERPL-CCNC: 26 U/L (ref 13–39)
BASOPHILS # BLD AUTO: 0 TH/CUMM (ref 0–0.2)
BASOPHILS NFR BLD AUTO: 0.5 % (ref 0–2)
BILIRUB SERPL-MCNC: 0.4 MG/DL (ref 0.3–1)
BUN SERPL-MCNC: 12 MG/DL (ref 7–25)
CALCIUM SERPL-MCNC: 9 MG/DL (ref 8.6–10.3)
CHLORIDE SERPL-SCNC: 101 MEQ/L (ref 98–107)
CO2 SERPL-SCNC: 24.2 MEQ/L (ref 21–31)
CREAT SERPL-MCNC: 0.6 MG/DL (ref 0.7–1.3)
EOSINOPHIL # BLD AUTO: 0.3 TH/CMM (ref 0.1–0.4)
EOSINOPHIL NFR BLD AUTO: 3.5 % (ref 0–5)
ERYTHROCYTE [DISTWIDTH] IN BLOOD BY AUTOMATED COUNT: 13 % (ref 11.5–20)
GLOBULIN SER-MCNC: 3.5 GM/DL
GLUCOSE SERPL-MCNC: 205 MG/DL (ref 70–105)
HCT VFR BLD CALC: 34.2 % (ref 41–60)
HGB BLD-MCNC: 11.6 GM/DL (ref 12–16)
LYMPHOCYTE AB SER FC-ACNC: 1.9 TH/CMM (ref 1.5–3)
LYMPHOCYTES NFR BLD AUTO: 22.9 % (ref 20–50)
MCH RBC QN AUTO: 29.2 PG (ref 26–30)
MCHC RBC AUTO-ENTMCNC: 33.8 PG (ref 28–36)
MCV RBC AUTO: 86.2 FL (ref 80–99)
MONOCYTES # BLD AUTO: 1 TH/CMM (ref 0.3–1)
MONOCYTES NFR BLD AUTO: 12.7 % (ref 2–10)
NEUTROPHILS # BLD: 4.9 TH/CMM (ref 1.8–8)
NEUTROPHILS NFR BLD AUTO: 60.4 % (ref 40–80)
PLATELET # BLD: 469 TH/CMM (ref 150–400)
PMV BLD AUTO: 7.3 FL
POTASSIUM SERPL-SCNC: 3 MEQ/L (ref 3.5–5.1)
RBC # BLD AUTO: 3.97 MIL/CMM (ref 4.3–5.7)
SODIUM SERPL-SCNC: 136 MEQ/L (ref 136–145)
WBC # BLD AUTO: 8.1 TH/CMM (ref 4.8–10.8)

## 2018-02-16 RX ADMIN — MORPHINE SULFATE PRN MG: 2 INJECTION, SOLUTION INTRAMUSCULAR; INTRAVENOUS at 16:52

## 2018-02-16 RX ADMIN — IPRATROPIUM BROMIDE SCH: 0.5 SOLUTION RESPIRATORY (INHALATION) at 01:00

## 2018-02-16 RX ADMIN — LACTULOSE SCH: 20 SOLUTION ORAL at 08:30

## 2018-02-16 RX ADMIN — NIFEDIPINE SCH MG: 30 TABLET, FILM COATED, EXTENDED RELEASE ORAL at 08:29

## 2018-02-16 RX ADMIN — ALBUTEROL SULFATE SCH: 2.5 SOLUTION RESPIRATORY (INHALATION) at 12:32

## 2018-02-16 RX ADMIN — INSULIN DETEMIR SCH UNITS: 100 INJECTION, SOLUTION SUBCUTANEOUS at 08:41

## 2018-02-16 RX ADMIN — ALBUTEROL SULFATE SCH MG: 2.5 SOLUTION RESPIRATORY (INHALATION) at 07:22

## 2018-02-16 RX ADMIN — ASPIRIN 81 MG SCH MG: 81 TABLET ORAL at 08:30

## 2018-02-16 RX ADMIN — MORPHINE SULFATE PRN MG: 2 INJECTION, SOLUTION INTRAMUSCULAR; INTRAVENOUS at 14:18

## 2018-02-16 RX ADMIN — Medication SCH EACH: at 08:29

## 2018-02-16 RX ADMIN — LACTULOSE SCH: 20 SOLUTION ORAL at 14:20

## 2018-02-16 RX ADMIN — INSULIN ASPART SCH: 100 INJECTION, SOLUTION INTRAVENOUS; SUBCUTANEOUS at 14:20

## 2018-02-16 RX ADMIN — IPRATROPIUM BROMIDE SCH MG: 0.5 SOLUTION RESPIRATORY (INHALATION) at 07:22

## 2018-02-16 RX ADMIN — PANTOPRAZOLE SODIUM SCH MG: 40 TABLET, DELAYED RELEASE ORAL at 08:29

## 2018-02-16 RX ADMIN — MORPHINE SULFATE PRN MG: 2 INJECTION, SOLUTION INTRAMUSCULAR; INTRAVENOUS at 08:27

## 2018-02-16 RX ADMIN — IPRATROPIUM BROMIDE SCH: 0.5 SOLUTION RESPIRATORY (INHALATION) at 12:32

## 2018-02-16 RX ADMIN — MORPHINE SULFATE PRN MG: 4 INJECTION, SOLUTION INTRAMUSCULAR; INTRAVENOUS at 12:03

## 2018-02-16 RX ADMIN — BUDESONIDE SCH MG: 0.5 SUSPENSION RESPIRATORY (INHALATION) at 07:22

## 2018-02-16 RX ADMIN — INSULIN ASPART SCH UNITS: 100 INJECTION, SOLUTION INTRAVENOUS; SUBCUTANEOUS at 08:39

## 2018-02-16 RX ADMIN — ALBUTEROL SULFATE SCH: 2.5 SOLUTION RESPIRATORY (INHALATION) at 03:00

## 2018-02-16 NOTE — INTERNAL MEDICINE PROG NOTE
Internal Medicine Subjective





- Subjective


Service Date: 18


Patient seen and examined:: with staff


Patient is:: awake


Patient Complaints of:: congestion


Per staff patient has:: tolerating meds





Internal Medicine Objective





- Results


Result Diagrams: 


 18 04:30





 18 04:30


Recent Labs: 


 Laboratory Last Values











WBC  8.1 Th/cmm (4.8-10.8)   18  04:30    


 


RBC  3.97 Mil/cmm (4.30-5.70)  L  18  04:30    


 


Hgb  11.6 gm/dL (12-16)  L  18  04:30    


 


Hct  34.2 % (41.0-60)  L  18  04:30    


 


MCV  86.2 fl (80-99)   18  04:30    


 


MCH  29.2 pg (26.0-30.0)   18  04:30    


 


MCHC Differential  33.8 pg (28.0-36.0)   18  04:30    


 


RDW  13.0 % (11.5-20.0)   18  04:30    


 


Plt Count  469 Th/cmm (150-400)  H D 18  04:30    


 


MPV  7.3 fl  18  04:30    


 


Neutrophils %  60.4 % (40.0-80.0)   18  04:30    


 


Lymphocytes %  22.9 % (20.0-50.0)   18  04:30    


 


Monocytes %  12.7 % (2.0-10.0)  H  18  04:30    


 


Eosinophils %  3.5 % (0.0-5.0)   18  04:30    


 


Basophils %  0.5 % (0.0-2.0)   18  04:30    


 


PT  9.8 SECONDS (9.5-11.5)   18  09:18    


 


INR  0.94  (0.5-1.4)   18  09:18    


 


PTT (Actin FS)  26.1 SECONDS (26.0-38.0)   18  09:18    


 


Specimen Source  Arterial   18  10:54    


 


Sample Site  Right Radial   18  10:54    


 


pH  7.41  (7.35-7.45)   18  10:54    


 


pCO2  43.0 mmHg (35.0-45.0)   18  10:54    


 


pO2  60.0 mmHg (80.0-100.0)  L  18  10:54    


 


HCO3  26.6 mEq/L (20.0-26.0)  H  18  10:54    


 


Base Excess  2.3 mEq/L (-3.0-3.0)   18  10:54    


 


O2 Saturation  91.0 % (92.0-100.0)  L  18  10:54    


 


Glen Test  Positive   18  10:54    


 


Vent Rate  NA   18  10:54    


 


Inspired O2  21   18  10:54    


 


Tidal Volume  NA   18  10:54    


 


PEEP  NA   18  10:54    


 


Pressure (ins/psv/peep)  NA   18  10:54    


 


Critical Value  LZHANG   18  10:54    


 


Sodium  136 mEq/L (136-145)   18  04:30    


 


Potassium  3.0 mEq/L (3.5-5.1)  L  18  04:30    


 


Chloride  101 mEq/L ()   18  04:30    


 


Carbon Dioxide  24.2 mEq/L (21.0-31.0)   18  04:30    


 


Anion Gap  13.8  (7.0-16.0)   18  04:30    


 


BUN  12 mg/dL (7-25)   18  04:30    


 


Creatinine  0.6 mg/dL (0.7-1.3)  L  18  04:30    


 


Est GFR ( Amer)  > 60.0 ml/min (>90)   18  04:30    


 


Est GFR (Non-Af Amer)  > 60.0 ml/min  18  04:30    


 


BUN/Creatinine Ratio  20.0   18  04:30    


 


Glucose  205 mg/dL ()  H  18  04:30    


 


POC Glucose  229 MG/DL (70 - 105)  H  18  11:47    


 


Hemoglobin A1c %  10.0 % (4.0-6.0)  H  18  20:45    


 


Calcium  9.0 mg/dL (8.6-10.3)   18  04:30    


 


Total Bilirubin  0.4 mg/dL (0.3-1.0)   18  04:30    


 


Direct Bilirubin  0.04 mg/dL (0.0-0.2)   18  06:45    


 


AST  26 U/L (13-39)   18  04:30    


 


ALT  38 U/L (7-52)   18  04:30    


 


Alkaline Phosphatase  69 U/L ()   18  04:30    


 


Ammonia  65 umol/L (16-53)  H  18  06:45    


 


Total Protein  6.8 gm/dL (6.0-8.3)   18  04:30    


 


Albumin  3.3 gm/dL (4.2-5.5)  L  18  04:30    


 


Globulin  3.5 gm/dL  18  04:30    


 


Albumin/Globulin Ratio  0.9  (1.0-1.8)  L  18  04:30    


 


Free T3  2.7 pg/mL (2.0-4.4)   18  06:45    


 


Urine Source  RANDOM   18  01:40    


 


Urine Color  ORANGE   18  01:40    


 


Urine Clarity  CLEAR  (CLEAR)   18  01:40    


 


Urine pH  6.0  (4.6 - 8.0)   18  01:40    


 


Ur Specific Gravity  >= 1.030  (1.005-1.030)   18  01:40    


 


Urine Protein  30 mg/dL (NEGATIVE)  H  18  01:40    


 


Urine Glucose (UA)  100 mg/dL (NEGATIVE)  H  18  01:40    


 


Urine Ketones  NEGATIVE mg/dL (NEGATIVE)   18  01:40    


 


Urine Blood  NEGATIVE  (NEGATIVE)   18  01:40    


 


Urine Nitrate  NEGATIVE  (NEGATIVE)   18  01:40    


 


Urine Bilirubin  NEGATIVE  (NEGATIVE)   18  01:40    


 


Urine Urobilinogen  0.2 E.U./dL (0.2 - 1.0)   18  01:40    


 


Ur Leukocyte Esterase  NEGATIVE  (NEGATIVE)   18  01:40    


 


Urine RBC  0-2 /hpf (0-5)  H  18  01:40    


 


Urine WBC  2-5 /hpf (0-5)   18  01:40    


 


Ur Epithelial Cells  MODERATE /lpf (FEW)   18  01:40    


 


Urine Bacteria  FEW /hpf (NONE SEEN)   18  01:40    


 


Hyaline Casts  2-5 /lpf (0-2)  H  18  01:40    


 


Urine Mucus  MODERATE /lpf (FEW)   18  01:40    


 


Blood Type  O POSITIVE   18  00:50    


 


Antibody Screen  NEGATIVE   18  00:50    


 


Crossmatch  See Detail   18  00:50    














- Physical Exam


Vitals and I&O: 


 Vital Signs











Temp  98.2 F   18 12:01


 


Pulse  90   18 12:01


 


Resp  17   18 12:01


 


BP  158/87   18 12:01


 


Pulse Ox  95   18 12:01








 Intake & Output











 02/15/18 02/16/18 02/16/18





 18:59 06:59 18:59


 


Intake Total 800  


 


Output Total 1500  


 


Balance -700  


 


Weight (lbs) 274 lb 299 lb 8 oz 


 


Intake:   


 


  Oral 800  


 


Output:   


 


  Urine 1500  


 


Other:   


 


  Stool Characteristics Soft Soft Liquid











Active Medications: 


Current Medications





Acetaminophen (Tylenol)  650 mg PO Q4HR PRN


   PRN Reason: PAIN OR FEVER >100.4


   Stop: 18 20:10


   Last Admin: 18 04:55 Dose:  650 mg


Albuterol Sulfate (Albuterol 2.5mg/3ml Neb Ud)  2.5 mg HHN Q4HRT formerly [facility]


   Stop: 18 02:59


   Last Admin: 18 07:22 Dose:  2.5 mg


Aspirin (Aspirin Chewable)  81 mg PO DAILY formerly [facility]


   Stop: 18 08:59


   Last Admin: 18 08:30 Dose:  81 mg


Benzocaine/Menthol (Cepacol)  1 josiah MM Q4H PRN


   PRN Reason: Sore Throat


Bisacodyl (Dulcolax 10 Mg Supp)  10 mg RC DAILY PRN


   PRN Reason: Constipation


   Stop: 18 20:10


Budesonide (Pulmicort)  0.5 mg HHN BIDRT formerly [facility]


   Stop: 18 06:59


   Last Admin: 18 07:22 Dose:  0.5 mg


Camphor/Menthol (Bengay Greaseless 10%-15%)  1 appl TP Q4H PRN


   PRN Reason: LEFT SHOULDER AND LOWER PAIN


   Last Admin: 18 08:49 Dose:  1 appl


Clopidogrel Bisulfate (Plavix)  75 mg PO DAILY formerly [facility]


   Stop: 18 08:59


   Last Admin: 18 08:30 Dose:  75 mg


Docusate Sodium (Colace)  100 mg PO DAILY PRN


   PRN Reason: Constipation


   Stop: 18 08:59


Famotidine (Pepcid)  20 mg PO BID formerly [facility]


   Stop: 18 08:59


   Last Admin: 18 08:30 Dose:  20 mg


Furosemide (Lasix)  40 mg PO DAILY formerly [facility]


   Stop: 18 08:59


   Last Admin: 18 08:30 Dose:  40 mg


Gabapentin (Neurontin)  600 mg PO BID formerly [facility]


   Stop: 18 08:59


   Last Admin: 18 08:28 Dose:  600 mg


Gemfibrozil (Lopid)  600 mg PO BID formerly [facility]


   Stop: 18 08:59


   Last Admin: 18 08:30 Dose:  600 mg


Glucagon (Glucagen)  1 mg IM PRN PRN


   PRN Reason: IF BS <70


Hydralazine HCl (Apresoline)  50 mg PO TID formerly [facility]


   Stop: 18 20:59


   Last Admin: 18 08:28 Dose:  50 mg


Insulin Aspart (Novolog Insulin Sliding Scale)  0 units SUBQ Q6HR formerly [facility]


   PRN Reason: Protocol


   Stop: 18 05:59


   Last Admin: 18 08:39 Dose:  2 units


Insulin Detemir (Levemir Insulin)  15 units SUBQ QAM formerly [facility]


   Stop: 18 08:59


   Last Admin: 18 08:41 Dose:  15 units


Insulin Detemir (Levemir Insulin)  30 units SUBQ HS formerly [facility]


   Stop: 18 20:59


   Last Admin: 02/15/18 21:58 Dose:  Not Given


Ipratropium Bromide (Atrovent Neb 0.5mg/2.5ml)  0.5 mg HHN Q6HRT formerly [facility]


   Stop: 18 00:59


   Last Admin: 18 07:22 Dose:  0.5 mg


Lactobacillus Rhamnosus (Culturelle 15b)  1 each PO DAILY formerly [facility]


   Stop: 04/15/18 08:59


   Last Admin: 18 08:29 Dose:  1 each


Lactulose (Cephulac)  20 gm PO TID formerly [facility]


   Stop: 18 20:59


   Last Admin: 18 08:30 Dose:  Not Given


Lorazepam (Ativan)  1 mg IVP Q6HR PRN; Protocol


   PRN Reason: Agitation


   Last Admin: 18 08:49 Dose:  1 mg


Losartan Potassium (Cozaar)  100 mg PO DAILY formerly [facility]


   Stop: 18 08:59


   Last Admin: 18 08:29 Dose:  100 mg


Magnesium Hydroxide (Milk Of Magnesia)  30 ml PO DAILY PRN


   PRN Reason: Constipation


   Stop: 18 20:10


Metformin HCl (Glucophage)  850 mg PO BID formerly [facility]


   Stop: 18 08:59


   Last Admin: 18 08:30 Dose:  850 mg


Methocarbamol (Robaxin)  500 mg PO Q6H [facility]


   Stop: 18 20:14


   Last Admin: 18 08:49 Dose:  500 mg


Miscellaneous (Probiotic Screen)  1 ea MC PRN PRN


   PRN Reason: PROTOCOL


   Stop: 18 16:29


Morphine Sulfate (Morphine)  2 mg IVP Q4HR PRN


   PRN Reason: Pain (Moderate)


   Stop: 04/10/18 20:48


   Last Admin: 18 08:27 Dose:  2 mg


Morphine Sulfate (Morphine)  4 mg IVP Q4H PRN


   PRN Reason: Pain (Severe)


   Stop: 04/10/18 20:49


   Last Admin: 18 12:03 Dose:  4 mg


Multivitamins/Vitamin C (Theragran)  1 tab PO DAILY formerly [facility]


   Stop: 18 08:59


   Last Admin: 18 08:30 Dose:  1 tab


Nifedipine (Procardia Xl)  60 mg PO DAILY formerly [facility]


   Stop: 18 08:59


   Last Admin: 18 08:29 Dose:  60 mg


Ondansetron HCl (Zofran)  4 mg IV Q6H PRN


   PRN Reason: Nausea / Vomiting


   Stop: 04/10/18 20:50


Pantoprazole Sodium (Protonix)  40 mg PO DAILY formerly [facility]


   Stop: 18 08:59


   Last Admin: 18 08:29 Dose:  40 mg


Potassium Chloride (Klor-Con)  20 meq PO DAILY formerly [facility]


   Stop: 18 08:59


Rivaroxaban (Xarelto)  10 mg PO DAILY formerly [facility]


   Stop: 18 15:59


   Last Admin: 18 08:30 Dose:  10 mg


Sodium Phosphate (Fleet Enema)  135 ml RC DAILY PRN


   PRN Reason: IF MOM OR DULCOLAX INEFFECTIVE


   Stop: 18 20:10








General: weak


HEENT: NC/AT, PERRLA


Neck: Supple


Lungs: CTAB


Abdomen: soft, non-tender





- Procedures


Procedures: 


 Procedures











Procedure Code Date


 


EXCISION OF DUODENUM, ENDO, DIAGN 4AH37BL 17


 


EXCISION OF STOMACH, ENDO, DIAGN 6DV04SE 17


 


REPOSITION LEFT UPPER FEMUR WITH INT FIX, OPEN APPROACH [id number] 18


 


TREAT THIGH FRACTURE [id number] 18














Internal Medicine Assmt/Plan





- Assessment


Assessment: 








Early pna


acute bronchitis


generalized weakness


congestion





HTN


 DM


 CAD


 Asthma/COPD


hx CVA/TIA w/ left side weakness





- Plan


Plan: 





ivabx


bronchodilators and supplemental oxygen


continue current plan of care 





Nutritional Asmnt/Malnutr-PDOC





- Dietary Evaluation


Malnutrition Findings (Please click <Entered> for more info): 








Nutritional Asmnt/Malnutrition                             Start:  18 18:

11


Text:                                                      Status: Complete    

  


Freq:                                                                          

  


 Document     18 18:11  LCHENG  (Rec: 18 18:18  LCHENG  [id number])


 Nutritional Asmnt/Malnutrition


     Patient General Information


      Nutritional Screening                      High Risk


      Diagnosis                                  early PNA, bronchitis


      Pertinent Medical Hx/Surgical Hx           HTN, DM, CAD, asthma/COPD, CVA


                                                 /TIA, PUD/GERD, appndectomy,


                                                 laparotomy, R tibial fracture


      Subjective Information                     Pt seen lying in bed at time


                                                 of visit, awake and alert. Pt


                                                 reported good appetite, the


                                                 food was small portion for him


                                                 .


      Current Diet Order/ Nutrition Support      low sodium


      Pertinent Medications                      lasix, novolog


      Pertinent Labs                              Cr 0.6, glucose 290, POC


                                                 276-314


                                                 2/6 A1c 10.0


     Nutritional Hx/Data


      Height                                     5 ft 11 in


      Height (Calculated Centimeters)            180.3


      Current Weight (lbs)                       274 lb


      Weight (Calculated Kilograms)              124.3


      Weight (Calculated Grams)                  174158.3


      Ideal Body Weight                          166


      % Ideal Body Weight                        165


      Body Mass Index (BMI)                      38.2


      Weight Status                              Obese


     GI Symptoms


      GI Symptoms                                None


      Last BM                                    2/


      Difficult in:                              None


      Usual diet at home                         pt stated he did not follow


                                                 any diet restrictions. He have


                                                 candy bars, chips everyday.


                                                 BS was stayed around 300.


      Skin Integrity/Comment:                    intact


     Estimated Nutritional Goals


      BEE in Kcals:                              Adj wt of IBW


      Calories/Kcals/Kg                          25-30


      Kcals Calculated                           0356-3506


      Protein:                                   Adj wt of IBW


      Protein g/k


      Protein Calculated                         88


      Fluid: ml                                  2200-2640ml (1ml/kcal)


     Nutritional Problem


      1. Problem


       Problem                                   altered nutrition related lab


                                                 values


       Etiology                                  hx of DM, excessive


                                                 carborhydrates intake


       Signs/Symptoms:                           glucose 290, -314


     Malnutrition Alert


      Protein-Calorie Malnutrition               N/A


      Is there a minimum of two criteria         No


       selected?                                 


       Query Text:Check all the applicable       


       criteria. A minimum of two criteria are   


       recommended for diagnosis of either       


       severe or non-severe malnutrition.        


     Intervention/Recommendation


      Comments                                   1. Recommend CCHO-60gm, double


                                                 protein and veggetables for


                                                 optimal glycemic control. RN


                                                 notified.


                                                 2. Diabetes education provide.


                                                 Pt showed interest.


                                                 3. Monitor PO intake, wt, labs


                                                 and skin integrity


                                                 4. F/U as moderate risk in 3-5


                                                 days, 2/10-


     Expected Outcomes/Goals


      Expected Outcomes/Goals                    1. PO intake to meet at least


                                                 75% of nutritional needs.


                                                 2. Wt stability, skin to


                                                 remain intact, labs to


                                                 approach WNL.

## 2018-02-25 NOTE — DISCHARGE SUMMARY
DATE OF DISCHARGE:  02/16/2018



The patient is a 52-year-old male patient.  The patient is well known to me from

[facility] and the patient has increasing cough, increasing shortness

of breath.  The patient is known to have history of diabetes, history of prior

stroke, history of hemiparesis, history of severe polyneuropathy and an x-ray

showed early pneumonia, for which he was admitted.  The patient was treated for

that with IV antibiotics and bronchodilator therapy and also was given physical

therapy.  Unfortunately, the patient had an incident of fall and patient broke

his hip and the patient requiring surgery with orthopedic doctor and the

patient's hip intertrochanteric fracture was repaired and the patient was given

some physical therapy, improved.



FINAL DIAGNOSES:  Status post fall, status post hip fracture, status post repair

of the hip fracture; history of hypertension; diabetes; coronary artery disease,

history of chronic obstructive pulmonary disease, history of cerebrovascular

accident, history of hemiparesis with left-sided weakness _____.



The patient was in stable condition on 02/16 and the patient is going to be

discharged to [facility] where I will follow the patient.



MEDICATIONS:  See the reconciliation sheet.



ACTIVITY:  He is going to get physical therapy.



DIET:  The patient on 2 g sodium, 2000 ADA diet.



I will closely follow the patient.





DD: 02/25/2018 02:55

DT: 02/25/2018 06:40

JOB# [id number]  [id number]

## 2018-08-20 ENCOUNTER — HOSPITAL ENCOUNTER (INPATIENT)
Dept: HOSPITAL 36 - ER | Age: 53
LOS: 4 days | Discharge: NURSING, REHAB OR LONG-TERM CARE | DRG: 698 | End: 2018-08-24
Attending: FAMILY MEDICINE | Admitting: FAMILY MEDICINE
Payer: MEDICARE

## 2018-08-20 DIAGNOSIS — B35.3: ICD-10-CM

## 2018-08-20 DIAGNOSIS — N31.9: Primary | ICD-10-CM

## 2018-08-20 DIAGNOSIS — Z79.1: ICD-10-CM

## 2018-08-20 DIAGNOSIS — D72.829: ICD-10-CM

## 2018-08-20 DIAGNOSIS — E11.00: ICD-10-CM

## 2018-08-20 DIAGNOSIS — I11.0: ICD-10-CM

## 2018-08-20 DIAGNOSIS — G47.33: ICD-10-CM

## 2018-08-20 DIAGNOSIS — Z66: ICD-10-CM

## 2018-08-20 DIAGNOSIS — E66.9: ICD-10-CM

## 2018-08-20 DIAGNOSIS — E11.40: ICD-10-CM

## 2018-08-20 DIAGNOSIS — K27.9: ICD-10-CM

## 2018-08-20 DIAGNOSIS — K29.70: ICD-10-CM

## 2018-08-20 DIAGNOSIS — I25.10: ICD-10-CM

## 2018-08-20 DIAGNOSIS — R07.89: ICD-10-CM

## 2018-08-20 DIAGNOSIS — J44.9: ICD-10-CM

## 2018-08-20 DIAGNOSIS — F41.9: ICD-10-CM

## 2018-08-20 DIAGNOSIS — N40.0: ICD-10-CM

## 2018-08-20 DIAGNOSIS — E11.51: ICD-10-CM

## 2018-08-20 DIAGNOSIS — K57.90: ICD-10-CM

## 2018-08-20 DIAGNOSIS — Z79.899: ICD-10-CM

## 2018-08-20 DIAGNOSIS — I69.954: ICD-10-CM

## 2018-08-20 LAB
BASOPHILS # BLD AUTO: 0.1 TH/CUMM (ref 0–0.2)
BASOPHILS NFR BLD AUTO: 0.9 % (ref 0–2)
EOSINOPHIL # BLD AUTO: 0.2 TH/CMM (ref 0.1–0.4)
EOSINOPHIL NFR BLD AUTO: 2.2 % (ref 0–5)
ERYTHROCYTE [DISTWIDTH] IN BLOOD BY AUTOMATED COUNT: 13 % (ref 11.5–20)
HCT VFR BLD CALC: 35.7 % (ref 41–60)
HGB BLD-MCNC: 12.3 GM/DL (ref 12–16)
LYMPHOCYTE AB SER FC-ACNC: 2.5 TH/CMM (ref 1.5–3)
LYMPHOCYTES NFR BLD AUTO: 34.8 % (ref 20–50)
MCH RBC QN AUTO: 27.9 PG (ref 26–30)
MCHC RBC AUTO-ENTMCNC: 34.6 PG (ref 28–36)
MCV RBC AUTO: 80.6 FL (ref 80–99)
MONOCYTES # BLD AUTO: 0.8 TH/CMM (ref 0.3–1)
MONOCYTES NFR BLD AUTO: 11.3 % (ref 2–10)
NEUTROPHILS # BLD: 3.6 TH/CMM (ref 1.8–8)
NEUTROPHILS NFR BLD AUTO: 50.8 % (ref 40–80)
PLATELET # BLD: 373 TH/CMM (ref 150–400)
PMV BLD AUTO: 7.4 FL
RBC # BLD AUTO: 4.43 MIL/CMM (ref 4.3–5.7)
WBC # BLD AUTO: 7.2 TH/CMM (ref 4.8–10.8)

## 2018-08-20 PROCEDURE — X3904: HCPCS

## 2018-08-20 PROCEDURE — Z7610: HCPCS

## 2018-08-20 NOTE — ED PHYSICIAN CHART
ED Chief Complaint/HPI





- Patient Information


Date Seen:: 08/20/18


Time Seen:: 23:05


Chief Complaint:: Right great toe pain


History of Present Illness:: 





Right great toe pain


Allergies:: 


 Allergies











Allergy/AdvReac Type Severity Reaction Status Date / Time


 


No Known Allergies Allergy   Verified 08/20/18 23:10











Vitals:: 


 Vital Signs - 8 hr











  08/20/18





  23:05


 


Temp 98.1 F


 


HR 88


 


RR 18


 


/80


 


O2 Sat % 96











Historian:: Patient, Medical Records


Review:: Nurse's Note Reviewed, Transfer documents Reviewed





ED Review of Systems





- Review of Systems


Skin: No skin lesions, No rash, No bruising


Head: No headache, No light-headedness


Eyes: No loss of vision, No pain, No diplopia


ENT: No earache, No nasal drainage, No sore throat, No tinnitus


Neck: No neck pain, No swelling, No thyromegaly, No stiffness, No mass noted


Cardio Vascular: No chest pain, No palpitations, No PND, No orthopnea, No edema


Pulmonary: No SOB, No cough, No sputum, No wheezing


GI: No nausea, No vomiting, No diarrhea, No pain, No melena, No hematochezia, 

No constipation, No hematemesis


G/U: No dysuria, No frequency, No hematuria


Musculoskeletal: Bone or joint pain


Endocrine: No polyuria, No polydipsia


Psychiatric: Prior psych history


Hematopoietic: No bruising, No lymphadenopathy


Allergic/Immuno: No urticaria, No angioedema


Neurological: No syncope, No focal symptoms, No weakness, No paresthesia, No 

headache, No seizure, No dizziness, No confusion, No vertigo





ED Past Medical History





- Past Medical History


Obtainable: Yes


Past Medical History: DM, CAD, Arthritis





Family Medical History





- Family Member


  ** Mother


History Unknown: Yes


Hx Family Coronary Artery Disease: Yes


Hx Family Diabetes: Yes





ED Physical Exam





- Physical Examination


General/Constitutional: Awake, Well-developed, well-nourished, Alert, No 

distress, GCS 15, Non-toxic appearing


Head: Atraumatic


Eyes: Lids, conjuctiva normal, PERRL, EOMI


Skin: Nl inspection, No rash, No skin lesions, No ecchymosis, Well hydrated, No 

lymphadenopathy


Other Skin comments:: 


no open lesions.  





dirt on left small toe and left lateral foot that wipes off with a glove.





R great toe with onchomycosis.  No warmth, lymphangitis or pain to palpation.





BLE without any lymphangitis, cellulitis or wounds.


ENMT: External ears, nose nl, Nasal exam nl, Lips, teeth, gums nl


Neck: Nontender, Full ROM w/o pain, No JVD, No nuchal rigidity, No bruit, No 

mass, No stridor


Respiratory: Nl effort/Exclusion, Clear to Auscultation, No Wheeze/Rhonchi/Rales


Cardio Vascular: RRR, No murmur, gallop, rubs, NL S1 S2


GI: No tenderness/rebounding/guarding, No organomegaly, No hernia, Normal BS's, 

Nondistended, No mass/bruits, No McBurney tenderness


: No CVA tenderness


Other Extremities comments:: 


3 to 4+ pitting edema of BLE.  Venous stasis changes.





Right great toe nail with onychomycosis





Left lateral foot and left small toe and 4th toe with dirt that is nearly black 

that is easily removable with glove.





RLE strength 5/5





LLE strength 3/5 (prior CVA)


Neuro/Psych: Alert/oriented, Normal sensory exam, Normal motor strength, 

Judgement/insight normal, Mood normal, No focal deficits


Other Neuro/Psych comments:: 





RLE strength 5/5





LLE strength 3/5 (prior CVA)


Misc: Normal back, No paraspinal tenderness





ED Assessment





- Assessment


General Assessment: 


EKG from 11:48:50 p.m.  normal sinus rhythm, Q waves in III and AVF.  Poor r 

wave progression in V1 to V3.  





R great toe with periosteal elevation on the tibial side of the distal phalanx 

of the right great toe.  Question of osteomyelitis.





CXR:  cardiomegaly wtih RLL atelectasis versus pneumonia.





spoke with Dr. Varela about this patient.  He would like to admit him to 

telemetry.











ED Septic Shock





- .


Is Septic Shock (SBP<90, OR Lactate>4 mmol\L) present?: No





- <6hrs of presentation:


Vital Signs: 


 Vital Signs - 8 hr











  08/20/18





  23:05


 


Temp 98.1 F


 


HR 88


 


RR 18


 


/80


 


O2 Sat % 96














ED Reassessment (Disposition)





- Reassessment


Reassessment Condition:: Unchanged





- Diagnosis


Diagnosis:: 





R great toe pain with onychomycosis and periosteal elevation, r/o osteomyelitis





Diabetes mellitus





Coronary artery disease





Hypertension





Hypertensive heart disease





residual weakness LLE from prior CVA





Peipheral vascular disease





Obstructive sleep apnea





COPD





unspecified asthma





Anxiety disorder





Hyperlipidemia





Peptic ulcer disease





Gastritis





Diverticulosis without perforation





Benign prostatic hyperplasia





Unspecified abdominal pain





Hepatomegaly











- Patient Disposition


Discharge/Transfer:: Acute Care w/in this hosp

## 2018-08-21 LAB
ALBUMIN SERPL-MCNC: 4 GM/DL (ref 4.2–5.5)
ALBUMIN/GLOB SERPL: 1.5 {RATIO} (ref 1–1.8)
ALP SERPL-CCNC: 77 U/L (ref 34–104)
ALT SERPL-CCNC: 29 U/L (ref 7–52)
ANION GAP SERPL CALC-SCNC: 14.2 MMOL/L (ref 7–16)
ANION GAP SERPL CALC-SCNC: 16.9 MMOL/L (ref 7–16)
AST SERPL-CCNC: 23 U/L (ref 13–39)
BASOPHILS NFR BLD: 0 % (ref 0–3)
BILIRUB SERPL-MCNC: 0.2 MG/DL (ref 0.3–1)
BUN SERPL-MCNC: 13 MG/DL (ref 7–25)
BUN SERPL-MCNC: 14 MG/DL (ref 7–25)
CALCIUM SERPL-MCNC: 8.9 MG/DL (ref 8.6–10.3)
CALCIUM SERPL-MCNC: 9 MG/DL (ref 8.6–10.3)
CHLORIDE SERPL-SCNC: 99 MEQ/L (ref 98–107)
CHLORIDE SERPL-SCNC: 99 MEQ/L (ref 98–107)
CHOLEST SERPL-MCNC: 193 MG/DL (ref ?–200)
CO2 SERPL-SCNC: 23.1 MEQ/L (ref 21–31)
CO2 SERPL-SCNC: 24.6 MEQ/L (ref 21–31)
CREAT SERPL-MCNC: 0.7 MG/DL (ref 0.7–1.3)
CREAT SERPL-MCNC: 0.9 MG/DL (ref 0.7–1.3)
EOSINOPHIL NFR BLD: 1 % (ref 0–5)
ERYTHROCYTE [DISTWIDTH] IN BLOOD BY AUTOMATED COUNT: 13.4 % (ref 11.5–20)
GLOBULIN SER-MCNC: 2.7 GM/DL
GLUCOSE SERPL-MCNC: 308 MG/DL (ref 70–105)
GLUCOSE SERPL-MCNC: 320 MG/DL (ref 70–105)
HCT VFR BLD CALC: 40.4 % (ref 41–60)
HDLC SERPL-MCNC: 20 MG/DL (ref 23–92)
HGB BLD-MCNC: 13.4 GM/DL (ref 12–16)
LYMPHOCYTES # BLD MANUAL: 25 % (ref 20–50)
MAGNESIUM SERPL-MCNC: 1.7 MG/DL (ref 1.9–2.7)
MCH RBC QN AUTO: 26.6 PG (ref 26–30)
MCHC RBC AUTO-ENTMCNC: 33.1 PG (ref 28–36)
MCV RBC AUTO: 80.3 FL (ref 80–99)
MONOCYTES # BLD MANUAL: 5 % (ref 2–10)
NEUTROPHILS NFR BLD AUTO: 68 % (ref 40–80)
NEUTS BAND NFR BLD: 1 % (ref 0–10)
PHOSPHATE SERPL-MCNC: 3.3 MG/DL (ref 2.5–5)
PLATELET # BLD: 314 TH/CMM (ref 150–400)
PMV BLD AUTO: 7.7 FL
POTASSIUM SERPL-SCNC: 3.8 MEQ/L (ref 3.5–5.1)
POTASSIUM SERPL-SCNC: 4 MEQ/L (ref 3.5–5.1)
RBC # BLD AUTO: 5.03 MIL/CMM (ref 4.3–5.7)
SODIUM SERPL-SCNC: 134 MEQ/L (ref 136–145)
SODIUM SERPL-SCNC: 135 MEQ/L (ref 136–145)
TRIGL SERPL-MCNC: 1016 MG/DL (ref ?–150)
URATE SERPL-MCNC: 4.2 MG/DL (ref 4.4–7.6)
WBC # BLD AUTO: 11.9 TH/CMM (ref 4.8–10.8)

## 2018-08-21 RX ADMIN — INSULIN ASPART SCH UNITS: 100 INJECTION, SOLUTION INTRAVENOUS; SUBCUTANEOUS at 12:31

## 2018-08-21 RX ADMIN — MORPHINE SULFATE PRN MG: 2 INJECTION, SOLUTION INTRAMUSCULAR; INTRAVENOUS at 21:27

## 2018-08-21 RX ADMIN — INSULIN DETEMIR SCH UNITS: 100 INJECTION, SOLUTION SUBCUTANEOUS at 20:21

## 2018-08-21 RX ADMIN — MORPHINE SULFATE PRN MG: 2 INJECTION, SOLUTION INTRAMUSCULAR; INTRAVENOUS at 21:22

## 2018-08-21 RX ADMIN — MORPHINE SULFATE PRN MG: 2 INJECTION, SOLUTION INTRAMUSCULAR; INTRAVENOUS at 15:00

## 2018-08-21 RX ADMIN — INSULIN ASPART SCH UNITS: 100 INJECTION, SOLUTION INTRAVENOUS; SUBCUTANEOUS at 20:15

## 2018-08-21 RX ADMIN — MORPHINE SULFATE PRN MG: 2 INJECTION, SOLUTION INTRAMUSCULAR; INTRAVENOUS at 08:02

## 2018-08-21 RX ADMIN — INSULIN ASPART SCH UNITS: 100 INJECTION, SOLUTION INTRAVENOUS; SUBCUTANEOUS at 06:32

## 2018-08-21 RX ADMIN — INSULIN ASPART SCH UNITS: 100 INJECTION, SOLUTION INTRAVENOUS; SUBCUTANEOUS at 18:09

## 2018-08-21 NOTE — GENERAL PROGRESS NOTE
Subjective





- Review of Systems


Service Date: 08/21/18


Events since last encounter: 





chart reviewed


claims pain right great toe for 2 weeks


US venous and arterial  done, no result yet


xray of foot - old fracture





Objective





- Results


Result Diagrams: 


 08/21/18 08:20





 08/21/18 08:20


Recent Labs: 


 Laboratory Last Values











WBC  11.9 Th/cmm (4.8-10.8)  H  08/21/18  08:20    


 


RBC  5.03 Mil/cmm (4.30-5.70)   08/21/18  08:20    


 


Hgb  13.4 gm/dL (12-16)   08/21/18  08:20    


 


Hct  40.4 % (41.0-60)  L  08/21/18  08:20    


 


MCV  80.3 fl (80-99)   08/21/18  08:20    


 


MCH  26.6 pg (26.0-30.0)   08/21/18  08:20    


 


MCHC Differential  33.1 pg (28.0-36.0)   08/21/18  08:20    


 


RDW  13.4 % (11.5-20.0)   08/21/18  08:20    


 


Plt Count  314 Th/cmm (150-400)   08/21/18  08:20    


 


MPV  7.7 fl  08/21/18  08:20    


 


Add Manual Diff  YES   08/21/18  08:20    


 


Neutrophils %  50.8 % (40.0-80.0)   08/20/18  23:40    


 


Band Neutrophils %  1 % (0-10)   08/21/18  08:20    


 


Lymphocytes %  34.8 % (20.0-50.0)   08/20/18  23:40    


 


Monocytes %  11.3 % (2.0-10.0)  H  08/20/18  23:40    


 


Eosinophils %  2.2 % (0.0-5.0)   08/20/18  23:40    


 


Basophils %  0.9 % (0.0-2.0)   08/20/18  23:40    


 


Neutrophils (Manual)  68 % (40-80)   08/21/18  08:20    


 


Lymphocytes  25 % (20-50)   08/21/18  08:20    


 


Monocytes  5 % (2-10)   08/21/18  08:20    


 


Eosinophils  1 % (0-5)   08/21/18  08:20    


 


Basophils  0 % (0-3)   08/21/18  08:20    


 


D-Dimer  < 100 ng/mL (100-400)  L  08/20/18  23:40    


 


Sodium  135 mEq/L (136-145)  L  08/21/18  08:20    


 


Potassium  4.0 mEq/L (3.5-5.1)   08/21/18  08:20    


 


Chloride  99 mEq/L ()   08/21/18  08:20    


 


Carbon Dioxide  23.1 mEq/L (21.0-31.0)   08/21/18  08:20    


 


Anion Gap  16.9  (7.0-16.0)  H  08/21/18  08:20    


 


BUN  13 mg/dL (7-25)   08/21/18  08:20    


 


Creatinine  0.7 mg/dL (0.7-1.3)   08/21/18  08:20    


 


Est GFR ( Amer)  > 60.0 ml/min (>90)   08/21/18  08:20    


 


Est GFR (Non-Af Amer)  > 60.0 ml/min  08/21/18  08:20    


 


BUN/Creatinine Ratio  18.6   08/21/18  08:20    


 


Glucose  308 mg/dL ()  H  08/21/18  08:20    


 


POC Glucose  282 MG/DL (70 - 105)  H  08/21/18  05:48    


 


Uric Acid  4.2 mg/dL (4.4-7.6)  L  08/20/18  23:40    


 


Calcium  8.9 mg/dL (8.6-10.3)   08/21/18  08:20    


 


Phosphorus  3.3 mg/dL (2.5-5.0)   08/20/18  23:40    


 


Magnesium  1.7 mg/dL (1.9-2.7)  L  08/20/18  23:40    


 


Total Bilirubin  0.2 mg/dL (0.3-1.0)  L  08/20/18  23:40    


 


AST  23 U/L (13-39)   08/20/18  23:40    


 


ALT  29 U/L (7-52)   08/20/18  23:40    


 


Alkaline Phosphatase  77 U/L ()   08/20/18  23:40    


 


Troponin I  0.01 ng/mL (0.01-0.05)   08/21/18  08:20    


 


B-Natriuretic Peptide  < 5.0 pg/mL (5.0-100.0)  L  08/21/18  08:20    


 


Total Protein  6.7 gm/dL (6.0-8.3)   08/20/18  23:40    


 


Albumin  4.0 gm/dL (4.2-5.5)  L  08/20/18  23:40    


 


Globulin  2.7 gm/dL  08/20/18  23:40    


 


Albumin/Globulin Ratio  1.5  (1.0-1.8)   08/20/18  23:40    


 


Triglycerides  1016 mg/dL (<150)  H  08/21/18  08:20    


 


Cholesterol  193 mg/dL (<200)   08/21/18  08:20    


 


LDL Cholesterol Direct  69 mg/dL ()  L  08/21/18  08:20    


 


HDL Cholesterol  20 mg/dL (23-92)  L  08/21/18  08:20    


 


TSH  1.91 uIU/ml (0.34-5.60)   08/21/18  08:20    














- Physical Exam


Vitals and I&O: 


 Vital Signs











Temp  97.4 F   08/21/18 11:52


 


Pulse  78   08/21/18 11:52


 


Resp  18   08/21/18 11:52


 


BP  107/68   08/21/18 11:52


 


Pulse Ox  94   08/21/18 11:52








 Intake & Output











 08/20/18 08/21/18 08/21/18





 18:59 06:59 18:59


 


Intake Total  50 


 


Output Total  600 


 


Balance  -550 


 


Weight (lbs)  127.913 kg 


 


Intake:   


 


  Oral  50 


 


Output:   


 


  Urine  600 


 


Other:   


 


  Weight Source  Estimated 











Active Medications: 


Current Medications





Insulin Aspart (Novolog Insulin Sliding Scale)  0 units SUBQ Q6HR [facility]; Protocol


   Stop: 10/20/18 05:59


   Last Admin: 08/21/18 06:32 Dose:  6 units


Morphine Sulfate (Morphine)  1 mg IV Q4HR PRN


   PRN Reason: MODERATE PAIN (level 4-6)


   Stop: 10/20/18 01:19


   Last Admin: 08/21/18 08:02 Dose:  1 mg


Morphine Sulfate (Morphine)  2 mg IV Q4HR PRN


   PRN Reason: FOR SEVERE PAIN (LEVEL 7-10)


   Stop: 10/20/18 01:23


Ondansetron HCl (Zofran)  4 mg IV Q6HR PRN


   PRN Reason: FOR NAUSEA AND VOMITING


   Stop: 10/20/18 01:24











- Procedures


Procedures: 


 Procedures











Procedure Code Date


 


EXCISION OF DUODENUM, ENDO, DIAGN [id number] 02/17/17


 


EXCISION OF STOMACH, ENDO, DIAGN [id number] 02/17/17


 


REPOSITION LEFT UPPER FEMUR WITH INT FIX, OPEN APPROACH [id number] 02/07/18


 


TREAT THIGH FRACTURE [id number] 02/07/18

## 2018-08-21 NOTE — DIAGNOSTIC IMAGING REPORT
CHEST X-RAY: AP view



INDICATION: pain



COMPARISON: 2/11/2018



FINDINGS: There is mild elevation of right hemidiaphragm and increased

right basal lung markings.  No focal consolidation or effusions. 

Cardiomegaly is noted.  The osseous structures are intact.



IMPRESSION:



Mild elevation of the right hemidiaphragm with increased right basal

lung markings favoring atelectatic changes.  No focal consolidation

identified.



Cardiomegaly.

## 2018-08-21 NOTE — DIAGNOSTIC IMAGING REPORT
Bilateral lower extremity arterial Doppler study



HISTORY: Peripheral vascular disease



COMPARISON: None



Technique: Longitudinal and transverse sonographic images of the

bilateral lower extremity arteries were obtained with doppler analysis.



FINDINGS: 



Exam of the right side demonstrates mild generalized atherosclerotic

vascular disease.  Primarily triphasic waveforms are noted.  



Right DEANNA is 1.1



Exam of the left side demonstrates moderate atherosclerotic vascular

disease.  Biphasic waveforms are seen within the left tibialis anterior

and left dorsalis pedis arteries.  



Left DEANNA is 1.0



IMPRESSION:



Mild to moderate atherosclerotic vascular disease, left greater than

right.  No evidence of occlusion.  If indicated CT angiography of the

lower extremities may also be obtained for further assessment.

## 2018-08-21 NOTE — HISTORY & PHYSICAL
ADMIT DATE:  08/21/2018



CHIEF COMPLAINT:  Right foot pain.



HISTORY OF PRESENT ILLNESS:  This is a 52-year-old male who is a resident of

[facility], admitted here to the telemetry unit due to a 1-week history

of right toe pain.  The patient states that he was unable to walk on his right

foot due to the severity of the pain.  The patient did not have any fevers at

the nursing home.  For further management, the patient is now admitted here to

the telemetry unit.



PAST MEDICAL HISTORY:  Diabetes, CAD and arthritis.



PAST SURGICAL HISTORY:  Unknown.



ALLERGIES:  No known allergies.



FAMILY HISTORY:  Noncontributory.



SOCIAL HISTORY:  The patient is a nursing home resident, requiring 24-hour

nursing care.



REVIEW OF SYSTEMS:

GENERAL:  Denies any fevers or chills.

CARDIOVASCULAR:  Denies chest pain.

RESPIRATORY:  Denies shortness of breath.

GASTROINTESTINAL:  Denies nausea, vomiting or abdominal pain.

GENITOURINARY:  Denies increased frequency or dysuria.

NEUROLOGIC:  No headaches, seizures or syncope.

MUSCULOSKELETAL:  The patient's right great toe noted with pain.

All other systems are reviewed and are negative.



PHYSICAL EXAMINATION:

GENERAL:  Obese male.  Awake, alert, in no apparent distress.

VITAL SIGNS:  Temperature 97.4, heart rate 78, blood pressure 107/68,

respirations 18 and O2 94%.

HEENT:  Head; normocephalic, atraumatic.

NECK:  Supple.  No mass.

LUNGS:  Clear bilaterally.

HEART:  Regular rate and rhythm.

SKIN:  The patient's right toenail noted to be black.



LABORATORY DATA:  WBC 11.9, H and H 13.4 and 40.4 and platelet of 314.  Sodium

135, potassium 4.0, chloride 99, BUN 13, creatinine 0.7, triglycerides 1016, LDL

69 and HDL 20.



DIAGNOSTIC DATA:  The patient had a lower arterial study done, impression is

mild-to-moderate atherosclerotic vascular changes, left greater than right.  No

evidence of occlusion.  The patient also had a lower extremity ultrasound done,

impression is no evidence of thrombus within the bilateral lower extremity

veins.  The patient also had an x-ray of the foot done and impression is mild

soft tissue swelling first distal phalanx.  No gross erosions identified. 

Suggesting previous likely old healed fracture to the proximal shaft of the

fifth metatarsal.  There appears to be an old distal fibular fracture,

degenerative changes.



ASSESSMENT:  Right foot pain, possible osteomyelitis, peripheral vascular

disease, hypertension, diabetes and obesity.



PLAN:  We will get Infectious Disease on the case as well surgical consultation

and Cardiology consultation as well.  We will keep the patient on IV antibiotics

of vancomycin.  Accu-Cheks with high dose sliding scale.  We will continue to

follow this patient.





DD: 08/21/2018 15:21

DT: 08/21/2018 16:53

JOB# [id number]  [id number]

## 2018-08-21 NOTE — DIAGNOSTIC IMAGING REPORT
Bilateral lower extremity DVT study



HISTORY: Pain



COMPARISON: None



Technique: Longitudinal and transverse sonographic images of the

bilateral lower extremity veins were obtained with doppler analysis.



FINDINGS: 



There is normal compressibility, augmentation and phasicity of the

bilateral common femoral, superficial femoral, popliteal, and posterior

tibial veins. No thrombus is visualized.



IMPRESSION: 



No evidence of thrombus within the bilateral lower extremity veins.

## 2018-08-21 NOTE — DIAGNOSTIC IMAGING REPORT
Right foot 3 views



Indication: pain first great toe pain



Comparison: none



Findings:  Mild to moderate degenerative changes are noted.  There is

probable likely healed old fracture of the fifth metatarsal of the

proximal shaft.  There also appears to be an old distal fibular

fracture.  Otherwise no acute fractures identified.  There is mild soft

tissue swelling of the first distal phalanx.  No gross erosions

identified.  Small distal Achilles spurs noted.



Impression:



Mild soft tissue swelling the first distal phalanx, no gross erosions

identified.  If there is continued concern for osteomyelitis, MRI may be

obtained for further assessment.



Suggestion of previous, likely old healed fracture to the proximal shaft

of the fifth metatarsal.    There also appears to be an old distal

fibular fracture.  Please correlate clinically.



Degenerative changes



In the setting of trauma, if clinical symptoms persist and there is

continued concern for an occult fracture, follow up exams in 5-7 days is

suggested.

## 2018-08-21 NOTE — CONSULTATION
DATE OF CONSULTATION:  08/21/2018



PATIENT OF:  Dr. Varela.



HISTORY AND PHYSICAL:  This is 52-year-old male patient who is obese,

complaining of swelling of both lower extremities, more on the left than the

right.  No complaint of chest pain, no palpitation.



PAST MEDICAL HISTORY:  Diabetes mellitus type 2, insulin-dependent, diabetic

peripheral neuropathy, diabetic peripheral vascular disease, hyperlipidemia,

hypo____, coronary artery disease, arthritis, obstructive sleep apnea, old left

femur fracture, anxiety, COPD.



FAMILY HISTORY:  Unremarkable.



SOCIAL HISTORY:  No history of smoking, alcohol abuse.



ALLERGIES:  No known allergies.



PHYSICAL EXAMINATION:

VITAL SIGNS:  Blood pressure 130/82, pulse 70, respirations 20.

HEAD:  Normocephalic.  No lumps or bumps.

EYES:  Pupils equally reactive to light.  Fundi show AV nicking, sclerae white,

conjunctivae pink.

NECK:  Carotid 2+.  Normal upstroke.  JVD flat.  Thyroid not palpable.  Lymph

nodes not palpable.

CHEST:  Shows increased AP diameter.  No kyphosis, scoliosis.

LUNGS:  Bilateral bronchovesicular breath sounds.

HEART:  PMI in fifth intercostal space with lateral to midclavicular line.  S1,

S2, S3, S4, soft systolic murmur.

ABDOMEN:  Soft.  Liver, spleen not palpable.  No organomegaly.  Bowel sounds

active.

NEUROLOGIC:  Peripheral neuropathy.

EXTREMITIES:  Peripheral pulses 1+.  No pedal edema.



CLINICAL IMPRESSION:

1.  Atypical chest pain.

2.  Diabetes mellitus type 2, insulin-dependent, diabetic peripheral vascular

disease, diabetic peripheral neuropathy, hyperlipidemia, hypertension, hypo____,

stable coronary artery disease, arthritis, obesity, old left femur fracture,

diabetes, peripheral neuropathy, chronic obstructive pulmonary disease.



PLAN:  The patient to continue present care.  Monitor the patient, followup

visit after discharge.  The patient to have surgical evaluation with Dr. Robles.





DD: 08/21/2018 12:22

DT: 08/21/2018 18:48

JOB# [id number]  [id number]

## 2018-08-21 NOTE — CONSULTATION
Consult Note





- Consult Note


Service Date: 08/21/18


Referring Physician: Janis Varela


Consult Note: 


PHYSICIAN Consultation Note:





Date of Admission: 08/21/18





Purpose of Consultation: Right big toenail changes





Chief Complaint: Patient NORBERT PEREZ was admitted to [facility] Telemetry 

with RIGHT FOOT PAIN POSSIBLE OSTEOMYELITIS,PERIPHERAL.





History of Present Illness:





51-year-old male with a past medical history of hypertension, diabetes mellitus 

type 2, CVA 1-1/2 years ago with associated left-sided weakness presented to 

the ER with the right leg and foot pain with darkening of the right big 

toenail.  On initial evaluation, his temperature was 98.1F and WBC count was 

7200.  Arterial study showed mild-to-moderate atherosclerotic vascular disease 

left greater than right.  Hand numbness ultrasound of lower extremity showed no 

evidence of thrombosis.  ID consult was called for further evaluation and 

management.





Past Medical History: Hypertension, diabetes mellitus type 2, CVA one half 

years ago.





Allergies











Allergy/AdvReac Type Severity Reaction Status Date / Time


 


No Known Allergies Allergy   Verified 08/20/18 23:10








 Vital Signs











Temp  97.4 F   08/21/18 11:52


 


Pulse  78   08/21/18 11:52


 


Resp  18   08/21/18 11:52


 


BP  107/68   08/21/18 11:52


 


Pulse Ox  94   08/21/18 11:52








 Intake & Output











 08/20/18 08/21/18 08/21/18





 18:59 06:59 18:59


 


Intake Total  50 


 


Output Total  600 


 


Balance  -550 


 


Weight (lbs)  127.913 kg 


 


Intake:   


 


  Oral  50 


 


Output:   


 


  Urine  600 


 


Other:   


 


  Stool Characteristics   Formed


 


  Weight Source  Estimated 








 Laboratory Results - last 24 hr











  08/20/18 08/20/18 08/20/18





  23:40 23:40 23:40


 


WBC  7.2  


 


RBC  4.43  


 


Hgb  12.3  


 


Hct  35.7 L  


 


MCV  80.6  


 


MCH  27.9  


 


MCHC Differential  34.6  


 


RDW  13.0  


 


Plt Count  373  


 


MPV  7.4  


 


Add Manual Diff   


 


Neutrophils %  50.8  


 


Band Neutrophils %   


 


Lymphocytes %  34.8  


 


Monocytes %  11.3 H  


 


Eosinophils %  2.2  


 


Basophils %  0.9  


 


Neutrophils (Manual)   


 


Lymphocytes   


 


Monocytes   


 


Eosinophils   


 


Basophils   


 


D-Dimer   


 


Sodium   134 L 


 


Potassium   3.8 


 


Chloride   99 


 


Carbon Dioxide   24.6 


 


Anion Gap   14.2 


 


BUN   14 


 


Creatinine   0.9 


 


Est GFR ( Amer)   > 60.0 


 


Est GFR (Non-Af Amer)   > 60.0 


 


BUN/Creatinine Ratio   15.6 


 


Glucose   320 H 


 


POC Glucose   


 


Uric Acid   4.2 L 


 


Calcium   9.0 


 


Phosphorus   3.3 


 


Magnesium   1.7 L 


 


Total Bilirubin   0.2 L 


 


AST   23 


 


ALT   29 


 


Alkaline Phosphatase   77 


 


Troponin I    0.01


 


B-Natriuretic Peptide   


 


Total Protein   6.7 


 


Albumin   4.0 L 


 


Globulin   2.7 


 


Albumin/Globulin Ratio   1.5 


 


Triglycerides   


 


Cholesterol   


 


LDL Cholesterol Direct   


 


HDL Cholesterol   


 


TSH   














  08/20/18 08/20/18 08/21/18





  23:40 23:40 05:48


 


WBC   


 


RBC   


 


Hgb   


 


Hct   


 


MCV   


 


MCH   


 


MCHC Differential   


 


RDW   


 


Plt Count   


 


MPV   


 


Add Manual Diff   


 


Neutrophils %   


 


Band Neutrophils %   


 


Lymphocytes %   


 


Monocytes %   


 


Eosinophils %   


 


Basophils %   


 


Neutrophils (Manual)   


 


Lymphocytes   


 


Monocytes   


 


Eosinophils   


 


Basophils   


 


D-Dimer   < 100 L 


 


Sodium   


 


Potassium   


 


Chloride   


 


Carbon Dioxide   


 


Anion Gap   


 


BUN   


 


Creatinine   


 


Est GFR ( Amer)   


 


Est GFR (Non-Af Amer)   


 


BUN/Creatinine Ratio   


 


Glucose   


 


POC Glucose    282 H


 


Uric Acid   


 


Calcium   


 


Phosphorus   


 


Magnesium   


 


Total Bilirubin   


 


AST   


 


ALT   


 


Alkaline Phosphatase   


 


Troponin I   


 


B-Natriuretic Peptide  < 5.0 L  


 


Total Protein   


 


Albumin   


 


Globulin   


 


Albumin/Globulin Ratio   


 


Triglycerides   


 


Cholesterol   


 


LDL Cholesterol Direct   


 


HDL Cholesterol   


 


TSH   














  08/21/18 08/21/18 08/21/18





  08:20 08:20 08:20


 


WBC  11.9 H  


 


RBC  5.03  


 


Hgb  13.4  


 


Hct  40.4 L  


 


MCV  80.3  


 


MCH  26.6  


 


MCHC Differential  33.1  


 


RDW  13.4  


 


Plt Count  314  


 


MPV  7.7  


 


Add Manual Diff  YES  


 


Neutrophils %   


 


Band Neutrophils %  1  


 


Lymphocytes %   


 


Monocytes %   


 


Eosinophils %   


 


Basophils %   


 


Neutrophils (Manual)  68  


 


Lymphocytes  25  


 


Monocytes  5  


 


Eosinophils  1  


 


Basophils  0  


 


D-Dimer   


 


Sodium   135 L 


 


Potassium   4.0 


 


Chloride   99 


 


Carbon Dioxide   23.1 


 


Anion Gap   16.9 H 


 


BUN   13 


 


Creatinine   0.7 


 


Est GFR ( Amer)   > 60.0 


 


Est GFR (Non-Af Amer)   > 60.0 


 


BUN/Creatinine Ratio   18.6 


 


Glucose   308 H 


 


POC Glucose   


 


Uric Acid   


 


Calcium   8.9 


 


Phosphorus   


 


Magnesium   


 


Total Bilirubin   


 


AST   


 


ALT   


 


Alkaline Phosphatase   


 


Troponin I   


 


B-Natriuretic Peptide    < 5.0 L


 


Total Protein   


 


Albumin   


 


Globulin   


 


Albumin/Globulin Ratio   


 


Triglycerides   1016 H 


 


Cholesterol   193 


 


LDL Cholesterol Direct   69 L 


 


HDL Cholesterol   20 L 


 


TSH   














  08/21/18 08/21/18 08/21/18





  08:20 08:20 12:13


 


WBC   


 


RBC   


 


Hgb   


 


Hct   


 


MCV   


 


MCH   


 


MCHC Differential   


 


RDW   


 


Plt Count   


 


MPV   


 


Add Manual Diff   


 


Neutrophils %   


 


Band Neutrophils %   


 


Lymphocytes %   


 


Monocytes %   


 


Eosinophils %   


 


Basophils %   


 


Neutrophils (Manual)   


 


Lymphocytes   


 


Monocytes   


 


Eosinophils   


 


Basophils   


 


D-Dimer   


 


Sodium   


 


Potassium   


 


Chloride   


 


Carbon Dioxide   


 


Anion Gap   


 


BUN   


 


Creatinine   


 


Est GFR ( Amer)   


 


Est GFR (Non-Af Amer)   


 


BUN/Creatinine Ratio   


 


Glucose   


 


POC Glucose    332 H


 


Uric Acid   


 


Calcium   


 


Phosphorus   


 


Magnesium   


 


Total Bilirubin   


 


AST   


 


ALT   


 


Alkaline Phosphatase   


 


Troponin I   0.01 


 


B-Natriuretic Peptide   


 


Total Protein   


 


Albumin   


 


Globulin   


 


Albumin/Globulin Ratio   


 


Triglycerides   


 


Cholesterol   


 


LDL Cholesterol Direct   


 


HDL Cholesterol   


 


TSH  1.91  








Home Medication











 Medication  Instructions  Recorded  Type


 


Insulin Human Regular [NovoLIN R*] See Protocol SUBQ ACHS 01/23/17 History


 


Lactulose 30 ml PO TID PRN 01/23/17 History


 


Zolpidem Tartrate [Ambien] 10 mg PO HS PRN 02/06/18 History


 


Acetaminophen [Tylenol] 650 mg PO Q4HR PRN  tab 02/16/18 Rx


 


Aspirin [Aspirin Chewable] 81 mg PO DAILY  ctb 02/16/18 Rx


 


Bisacodyl [Dulcolax 10 Mg Supp] 10 mg RC DAILY PRN  sup 02/16/18 Rx


 


Clopidogrel [Plavix] 75 mg PO DAILY  tab 02/16/18 Rx


 


Docusate Sodium [Colace] 100 mg PO DAILY PRN  cap 02/16/18 Rx


 


Famotidine [Pepcid] 20 mg PO BID  tab 02/16/18 Rx


 


Fleet Enema 135 ml RC DAILY PRN  btl 02/16/18 Rx


 


GLUCAGON HCl [Glucagen] 1 mg IM PRN PRN  kit 02/16/18 Rx


 


Gabapentin [Neurontin*] 600 mg PO BID  cap 02/16/18 Rx


 


Gemfibrozil [Lopid*] 600 mg PO BID  tab 02/16/18 Rx


 


Hydralazine [Apresoline*] 50 mg PO TID  tab 02/16/18 Rx


 


Lactobacillus Rhamnosus GG 15B 1 each PO DAILY  cap.sprink 02/16/18 Rx





[Culturelle 15B]   


 


Losartan Potassium [Cozaar] 100 mg PO DAILY  tab 02/16/18 Rx


 


Magnesium Hydroxide [Milk of 30 ml PO DAILY PRN  udc 02/16/18 Rx





Magnesia]   


 


Methocarbamol [Robaxin] 500 mg PO Q6H  tab 02/16/18 Rx


 


Multivitamin [Theragran] 1 tab PO DAILY  tab 02/16/18 Rx


 


NIFEdipine [Procardia Xl] 60 mg PO DAILY  ter 02/16/18 Rx


 


Pantoprazole [Protonix] 40 mg PO DAILY  ect 02/16/18 Rx


 


Potassium Chloride ER [Klor-Con] 20 meq PO DAILY  ter 02/16/18 Rx


 


Rivaroxaban [Xarelto] 10 mg PO DAILY  tab 02/16/18 Rx


 


metFORMIN [Glucophage] 850 mg PO BID  tab 02/16/18 Rx


 


Albuterol Nebulizer 2.5mg/3mL 2.5 mg HHN Q4HRT PRN 08/20/18 History





[Albuterol Neb UD*]   


 


Benzocaine/Menthol [Cepacol Sore 1 each PO Q4HR PRN 08/20/18 History





Throat Lozenge]   


 


Furosemide [Lasix] 20 mg PO DAILY 08/20/18 History


 


Insulin Detemir [Levemir Insulin] 20 units SUBQ QAM 08/20/18 History


 


Insulin Detemir [Levemir Insulin] 35 units SUBQ HS 08/20/18 History


 


Menthol/Methyl Salicylate Cre 1 appl TP Q4HR PRN 08/20/18 History





[Bengay Greaseless 10%-15%]   








Current Medications











Generic Name Dose Route Start Last Admin





  Trade Name Freq  PRN Reason Stop Dose Admin


 


Acetaminophen  650 mg  08/21/18 14:37  





  Tylenol  PO  10/20/18 14:36  





  Q4HR PRN   





  PAIN OR FEVER >100.4   





     





     





     


 


Acetaminophen/Hydrocodone Bitart  1 tab  08/21/18 14:42  





  Norco 5mg/325mg  PO  10/20/18 14:41  





  Q6H PRN   





  mild pain   





     





     





     


 


Albuterol Sulfate  2.5 mg  08/21/18 14:37  





  Albuterol 2.5mg/3ml Neb Ud  HHN  10/20/18 14:36  





  Q4HRT PRN   





  Shortness of Breath   





     





     





     


 


Aspirin  81 mg  08/22/18 09:00  





  Aspirin Chewable  PO  10/21/18 08:59  





  DAILY [facility]   





     





     





     





     


 


Benzocaine/Menthol   josiah  08/21/18 14:37  





  Cepacol  MM  10/20/18 14:36  





  Q4HR PRN   





  Sore Throat   





     





     





     


 


Bisacodyl  10 mg  08/21/18 14:37  





  Dulcolax 10 Mg Supp  RC  10/20/18 14:36  





  DAILY PRN   





  Constipation   





     





     





     


 


Camphor/Menthol  1 appl  08/21/18 14:37  





  Bengay Greaseless 10%-15%  TP  10/20/18 14:36  





  Q4HR PRN   





  Pain (Mild)   





     





     





     


 


Clopidogrel Bisulfate  75 mg  08/22/18 09:00  





  Plavix  PO  10/21/18 08:59  





  DAILY [facility]   





     





     





     





     


 


Docusate Sodium  100 mg  08/21/18 14:37  





  Colace  PO  10/20/18 14:36  





  DAILY PRN   





  Constipation   





     





     





     


 


Famotidine  20 mg  08/21/18 17:00  





  Pepcid  PO  10/20/18 16:59  





  BID [facility]   





     





     





     





     


 


Furosemide  20 mg  08/22/18 09:00  





  Lasix  PO  10/21/18 08:59  





  DAILY [facility]   





     





     





     





     


 


Gabapentin  600 mg  08/21/18 17:00  





  Neurontin  PO  10/20/18 16:59  





  BID [facility]   





     





     





     





     


 


Gemfibrozil  600 mg  08/21/18 16:30  





  Lopid  PO  10/20/18 16:29  





  BIDAC [facility]   





     





     





     





     


 


Gemfibrozil  600 mg  08/21/18 17:00  





  Lopid  PO  10/20/18 16:59  





  BID [facility]   





     





     





     





     


 


Glucagon  1 mg  08/21/18 14:37  





  Glucagen  IM  10/20/18 14:36  





  PRN PRN   





  IF BS <70   





     





     





     


 


Hydralazine HCl  50 mg  08/21/18 21:00  





  Apresoline  PO  10/20/18 20:59  





  TID [facility]   





     





     





     





     


 


Magnesium Sulfate  2 gm in 50 mls @ 25 mls/hr  08/21/18 13:00  





  Magnesium Sulfate Premix  IV  08/21/18 14:59  





  X1 ONE   





     





     





     





     


 


Insulin Aspart  0 units  08/21/18 06:00  08/21/18 12:31





  Novolog Insulin Sliding Scale  SUBQ  10/20/18 05:59  8 units





  Q6HR [facility]   Administration





     





     





  Protocol   





     


 


Insulin Detemir  20 units  08/22/18 09:00  





  Levemir Insulin  SUBQ  10/21/18 08:59  





  QAM [facility]   





     





     





  Protocol   





     


 


Insulin Detemir  35 units  08/21/18 21:00  





  Levemir Insulin  SUBQ  10/20/18 20:59  





  HS [facility]   





     





     





  Protocol   





     


 


Insulin Human Regular  0 units  08/21/18 16:30  





  Novolin R  SUBQ  10/20/18 16:29  





  ACHS [facility]   





     





     





  Protocol   





     


 


Lactobacillus Rhamnosus  1 each  08/22/18 09:00  





  Culturelle 15b  PO  10/21/18 08:59  





  DAILY [facility]   





     





     





     





     


 


Losartan Potassium  100 mg  08/22/18 09:00  





  Cozaar  PO  10/21/18 08:59  





  DAILY [facility]   





     





     





     





     


 


Magnesium Hydroxide  30 ml  08/21/18 14:37  





  Milk Of Magnesia  PO  10/20/18 14:36  





  DAILY PRN   





  Constipation   





     





     





     


 


Metformin HCl  850 mg  08/21/18 17:00  





  Glucophage  PO  10/20/18 16:59  





  BID [facility]   





     





     





     





     


 


Methocarbamol  500 mg  08/21/18 14:45  





  Robaxin  PO  10/20/18 14:44  





  Q6H [facility]   





     





     





     





     


 


Miscellaneous  30 ml  08/21/18 14:37  





  Lactulose [Lactulose]  PO   





  TID PRN   





  Constipation   





     





     





     


 


Miscellaneous  10 mg  08/21/18 14:37  





  Zolpidem Tartrate [Ambien]  PO   





  HS PRN   





  Insomnia   





     





     





     


 


Miscellaneous  1 ea  08/21/18 14:45  





  Vancomycin Iv Per Pharmacy    10/20/18 14:44  





  PRN [facility]   





     





     





     





     


 


Morphine Sulfate  1 mg  08/21/18 01:20  08/21/18 08:02





  Morphine  IV  10/20/18 01:19  1 mg





  Q4HR PRN   Administration





  MODERATE PAIN (level 4-6)   





     





     





     


 


Morphine Sulfate  2 mg  08/21/18 01:24  





  Morphine  IV  10/20/18 01:23  





  Q4HR PRN   





  FOR SEVERE PAIN (LEVEL 7-10)   





     





     





     


 


Multivitamins/Vitamin C  1 tab  08/22/18 09:00  





  Theragran  PO  10/21/18 08:59  





  DAILY [facility]   





     





     





     





     


 


Nifedipine  60 mg  08/22/18 09:00  





  Procardia Xl  PO  10/21/18 08:59  





  DAILY [facility]   





     





     





     





     


 


Ondansetron HCl  4 mg  08/21/18 01:25  





  Zofran  IV  10/20/18 01:24  





  Q6HR PRN   





  FOR NAUSEA AND VOMITING   





     





     





     


 


Pantoprazole Sodium  40 mg  08/22/18 09:00  





  Protonix  PO  10/21/18 08:59  





  DAILY [facility]   





     





     





     





     


 


Potassium Chloride  20 meq  08/22/18 09:00  





  Klor-Con  PO  10/21/18 08:59  





  DAILY [facility]   





     





     





     





     


 


Rivaroxaban  10 mg  08/22/18 09:00  





  Xarelto  PO  10/21/18 08:59  





  DAILY [facility]   





     





     





     





     


 


Sodium Phosphate  135 ml  08/21/18 14:37  





  Fleet Enema  RC  10/20/18 14:36  





  DAILY PRN   





  IF MOM OR DULCOLAX INEFFECTIVE   





     





     





     








Review of Systems:


A 12 point ROS was reviewed with the pertinent positive and negatives noted in 

the HPI.





Social History





Smoking Status                   Smoker, status unknown


Drug Use                         No


Alcohol Use                      No





Family Medical History





Family Medical History                                     Start:  08/21/18 02:

31


Freq:   ONCE                                               Status: Active      

  


Protocol:                                                                      

  


 Document     08/21/18 02:31  ALEXANDRA  (Rec: 08/21/18 03:21  BHAVNA HILTON-

MS6)


 Family Medical History


     Mother


      History Unknown                            Yes


      Ethnicity                                  Non-





Physical Exam:





General: Comfortable, not in acute distress obese.





HEENT: Head: Normocephalic, atraumatic.  Oral cavity: Moist, pink tongue.  Eyes

: No pallor no icterus.  Pupils PERRLA EOMI.





Neck: Supple, no JVD.  No carotid bruit.  No use of accessory muscles.  Trachea 

in midline.





Cardio: S1 and S2 within normal limits regular rhythm no murmur or gallop





Respiratory: CTAP





Abdominal: Soft, nontender nondistended pulses present.





Genital/Urinary: Deferred





Extremities: No cyanosis, no clubbing, no edema.  Right great toenail is black 

and thickened.





Neurological: Alert and awake oriented 3 no focal visit.





Assessment: 


1.  Tinea pedis.


2.  Diabetes mellitus type 2.


3.  Peripheral artery disease.  No occlusion.  


4.  Diabetes mellitus type 2.


5.  Hypertension.


6.  Obesity.


7.  Leukocytosis.


8.  Right foot and leg pain.  No evidence of for occlusion, not diabetes of 

DVT.  No evidence of cellulitis clinically.





Plan: 


Will give Lamisil.  Check CBC.





Thank you, Dr. Varela for involving me in taking care of this patient.





Signed,





Adeel Munguia M.D.


08/21/[id number]

## 2018-08-22 LAB
ANION GAP SERPL CALC-SCNC: 14.5 MMOL/L (ref 7–16)
BASOPHILS NFR BLD: 0 % (ref 0–3)
BUN SERPL-MCNC: 13 MG/DL (ref 7–25)
CALCIUM SERPL-MCNC: 9.1 MG/DL (ref 8.6–10.3)
CHLORIDE SERPL-SCNC: 101 MEQ/L (ref 98–107)
CO2 SERPL-SCNC: 23.5 MEQ/L (ref 21–31)
CREAT SERPL-MCNC: 0.7 MG/DL (ref 0.7–1.3)
EOSINOPHIL NFR BLD: 1 % (ref 0–5)
ERYTHROCYTE [DISTWIDTH] IN BLOOD BY AUTOMATED COUNT: 13.2 % (ref 11.5–20)
GLUCOSE SERPL-MCNC: 352 MG/DL (ref 70–105)
HBA1C MFR BLD: 10.1 % (ref 4–6)
HCT VFR BLD CALC: 37.7 % (ref 41–60)
HGB BLD-MCNC: 12.5 GM/DL (ref 12–16)
HGB BLD-MCNC: 13 G/DL (ref 4–35)
LYMPHOCYTES # BLD MANUAL: 27 % (ref 20–50)
MCH RBC QN AUTO: 26.7 PG (ref 26–30)
MCHC RBC AUTO-ENTMCNC: 33.1 PG (ref 28–36)
MCV RBC AUTO: 80.7 FL (ref 80–99)
MONOCYTES # BLD MANUAL: 6 % (ref 2–10)
NEUTROPHILS NFR BLD AUTO: 65 % (ref 40–80)
NEUTS BAND NFR BLD: 1 % (ref 0–10)
PLATELET # BLD: 334 TH/CMM (ref 150–400)
PMV BLD AUTO: 7.9 FL
POTASSIUM SERPL-SCNC: 4 MEQ/L (ref 3.5–5.1)
RBC # BLD AUTO: 4.67 MIL/CMM (ref 4.3–5.7)
SODIUM SERPL-SCNC: 135 MEQ/L (ref 136–145)
WBC # BLD AUTO: 8.2 TH/CMM (ref 4.8–10.8)

## 2018-08-22 RX ADMIN — TERBINAFINE HYDROCHLORIDE SCH APPL: 1 CREAM TOPICAL at 09:06

## 2018-08-22 RX ADMIN — Medication SCH EACH: at 09:10

## 2018-08-22 RX ADMIN — INSULIN ASPART SCH UNITS: 100 INJECTION, SOLUTION INTRAVENOUS; SUBCUTANEOUS at 17:45

## 2018-08-22 RX ADMIN — INSULIN ASPART SCH UNITS: 100 INJECTION, SOLUTION INTRAVENOUS; SUBCUTANEOUS at 07:33

## 2018-08-22 RX ADMIN — PANTOPRAZOLE SODIUM SCH MG: 40 TABLET, DELAYED RELEASE ORAL at 09:10

## 2018-08-22 RX ADMIN — POTASSIUM CHLORIDE SCH MEQ: 20 TABLET, EXTENDED RELEASE ORAL at 09:10

## 2018-08-22 RX ADMIN — ASPIRIN 81 MG SCH MG: 81 TABLET ORAL at 09:10

## 2018-08-22 RX ADMIN — INSULIN DETEMIR SCH UNITS: 100 INJECTION, SOLUTION SUBCUTANEOUS at 20:44

## 2018-08-22 RX ADMIN — TERBINAFINE HYDROCHLORIDE SCH APPL: 1 CREAM TOPICAL at 16:31

## 2018-08-22 RX ADMIN — MORPHINE SULFATE PRN MG: 2 INJECTION, SOLUTION INTRAMUSCULAR; INTRAVENOUS at 21:41

## 2018-08-22 RX ADMIN — NIFEDIPINE SCH MG: 30 TABLET, FILM COATED, EXTENDED RELEASE ORAL at 09:08

## 2018-08-22 RX ADMIN — MORPHINE SULFATE PRN MG: 2 INJECTION, SOLUTION INTRAMUSCULAR; INTRAVENOUS at 07:34

## 2018-08-22 RX ADMIN — INSULIN ASPART SCH UNITS: 100 INJECTION, SOLUTION INTRAVENOUS; SUBCUTANEOUS at 11:47

## 2018-08-22 RX ADMIN — INSULIN ASPART SCH UNITS: 100 INJECTION, SOLUTION INTRAVENOUS; SUBCUTANEOUS at 20:42

## 2018-08-22 RX ADMIN — MORPHINE SULFATE PRN MG: 2 INJECTION, SOLUTION INTRAMUSCULAR; INTRAVENOUS at 16:31

## 2018-08-22 RX ADMIN — INSULIN DETEMIR SCH UNITS: 100 INJECTION, SOLUTION SUBCUTANEOUS at 09:18

## 2018-08-22 NOTE — CARDIOLOGY
08/21/2018



The patient of Dr. Varela.



M-MODE ECHOCARDIOGRAM:  Mitral valve, anterior leaflet of mitral valve shows

normal excursion, EF velocity.  Posterior leaflet of the mitral valve shows

normal excursion.  Left ventricular posterior wall shows increased thickness,

normal excursion.  Interventricular septum shows increased thickness, normal

excursion, hypertrophy of the left ventricle, ejection fraction 70%.  Left

atrium enlarged 4.4 cm.  Aortic root shows normal dimension, normal excursion of

aortic leaflets.



CONCLUSION:  Hypertrophy of the left ventricle, left atrial enlargement,

ejection fraction 70%.



2D ECHO:  Long axis view showed normal sized left ventricle with hypertrophy of

the left ventricle.  Left atrium enlarged.  Aortic root shows normal dimension,

normal excursion of aortic leaflets.  Short axis view of mitral valve normal. 

Short axis view of aortic valve normal.  Apical four chamber view showed normal

sized left ventricle with hypertrophy of the left ventricle, left atrium and

right ventricle enlarged.  Right atrium normal.



CONCLUSION:  Right ventricular enlargement, left atrial enlargement, hypertrophy

of the left ventricle, ejection fraction 70%.





DD: 08/22/2018 12:38

DT: 08/22/2018 14:58

[facility]# [id number]  [id number]

## 2018-08-22 NOTE — GENERAL PROGRESS NOTE
Subjective





- Review of Systems


Events since last encounter: 





rt big toe pain 


in no distress 





Objective





- Results


Result Diagrams: 


 18 08:04





 18 08:04


Recent Labs: 


 Laboratory Last Values











WBC  8.2 Th/cmm (4.8-10.8)   18  08:04    


 


RBC  4.67 Mil/cmm (4.30-5.70)   18  08:04    


 


Hgb  12.5 gm/dL (12-16)   18  08:04    


 


Hct  37.7 % (41.0-60)  L  18  08:04    


 


MCV  80.7 fl (80-99)   18  08:04    


 


MCH  26.7 pg (26.0-30.0)   18  08:04    


 


MCHC Differential  33.1 pg (28.0-36.0)   18  08:04    


 


RDW  13.2 % (11.5-20.0)   18  08:04    


 


Plt Count  334 Th/cmm (150-400)   18  08:04    


 


MPV  7.9 fl  18  08:04    


 


Add Manual Diff  YES   18  08:04    


 


Neutrophils %  50.8 % (40.0-80.0)   18  23:40    


 


Band Neutrophils %  1 % (0-10)   18  08:04    


 


Lymphocytes %  34.8 % (20.0-50.0)   18  23:40    


 


Monocytes %  11.3 % (2.0-10.0)  H  18  23:40    


 


Eosinophils %  2.2 % (0.0-5.0)   18  23:40    


 


Basophils %  0.9 % (0.0-2.0)   18  23:40    


 


Neutrophils (Manual)  65 % (40-80)   18  08:04    


 


Lymphocytes  27 % (20-50)   18  08:04    


 


Monocytes  6 % (2-10)   18  08:04    


 


Eosinophils  1 % (0-5)   18  08:04    


 


Basophils  0 % (0-3)   18  08:04    


 


D-Dimer  < 100 ng/mL (100-400)  L  18  23:40    


 


Sodium  135 mEq/L (136-145)  L  18  08:04    


 


Potassium  4.0 mEq/L (3.5-5.1)   18  08:04    


 


Chloride  101 mEq/L ()   18  08:04    


 


Carbon Dioxide  23.5 mEq/L (21.0-31.0)   18  08:04    


 


Anion Gap  14.5  (7.0-16.0)   18  08:04    


 


BUN  13 mg/dL (7-25)   18  08:04    


 


Creatinine  0.7 mg/dL (0.7-1.3)   18  08:04    


 


Est GFR ( Amer)  > 60.0 ml/min (>90)   18  08:04    


 


Est GFR (Non-Af Amer)  > 60.0 ml/min  18  08:04    


 


BUN/Creatinine Ratio  18.6   18  08:04    


 


Glucose  352 mg/dL ()  H  18  08:04    


 


POC Glucose  330 MG/DL (70 - 105)  H  18  11:40    


 


Uric Acid  4.2 mg/dL (4.4-7.6)  L  18  23:40    


 


Calcium  9.1 mg/dL (8.6-10.3)   18  08:04    


 


Phosphorus  3.3 mg/dL (2.5-5.0)   18  23:40    


 


Magnesium  1.7 mg/dL (1.9-2.7)  L  18  23:40    


 


Total Bilirubin  0.2 mg/dL (0.3-1.0)  L  18  23:40    


 


AST  23 U/L (13-39)   18  23:40    


 


ALT  29 U/L (7-52)   18  23:40    


 


Alkaline Phosphatase  77 U/L ()   18  23:40    


 


Troponin I  0.01 ng/mL (0.01-0.05)   18  01:10    


 


B-Natriuretic Peptide  < 5.0 pg/mL (5.0-100.0)  L  18  08:20    


 


Total Protein  6.7 gm/dL (6.0-8.3)   18  23:40    


 


Albumin  4.0 gm/dL (4.2-5.5)  L  18  23:40    


 


Globulin  2.7 gm/dL  18  23:40    


 


Albumin/Globulin Ratio  1.5  (1.0-1.8)   18  23:40    


 


Triglycerides  1016 mg/dL (<150)  H  18  08:20    


 


Cholesterol  193 mg/dL (<200)   18  08:20    


 


LDL Cholesterol Direct  69 mg/dL ()  L  18  08:20    


 


HDL Cholesterol  20 mg/dL (23-92)  L  18  08:20    


 


TSH  1.91 uIU/ml (0.34-5.60)   18  08:20    


 


Vancomycin Trough  7.7 ug/mL (5-10)   18  08:04    














- Physical Exam


Vitals and I&O: 


 Vital Signs











Temp  97.4 F   18 12:00


 


Pulse  87   18 14:16


 


Resp  18   18 12:00


 


BP  157/78   18 14:16


 


Pulse Ox  93   18 12:00








 Intake & Output











 18





 18:59 06:59 18:59


 


Intake Total  1150 


 


Balance  1150 


 


Weight (lbs)  124.738 kg 


 


Intake:   


 


  Intake, IV Amount  500 


 


    Vancomycin HCl 1.25 gm In  500 





    Sodium Chloride 0.9% 250   





    ml @ 165 mls/hr IV Q8H   





    [facility] Rx#:[id number]   


 


  Oral  650 


 


Other:   


 


  Stool Characteristics Formed Formed Soft


 


  Weight Source  Bedscale 











Active Medications: 


Current Medications





Acetaminophen (Tylenol)  650 mg PO Q4H PRN


   PRN Reason: PAIN OR FEVER >100.4


   Stop: 10/20/18 14:36


Acetaminophen/Hydrocodone Bitart (Norco 5mg/325mg)  1 tab PO Q6H PRN


   PRN Reason: mild pain


   Stop: 10/20/18 14:41


Albuterol Sulfate (Albuterol 2.5mg/3ml Neb Ud)  2.5 mg HHN Q4HRT PRN


   PRN Reason: Shortness of Breath


   Stop: 10/20/18 14:36


Aspirin (Aspirin Chewable)  81 mg PO DAILY LUZ


   Stop: 10/21/18 08:59


   Last Admin: 18 09:10 Dose:  81 mg


Benzocaine/Menthol (Cepacol)  1 josiah MM Q4HR PRN


   PRN Reason: Sore Throat


   Stop: 10/20/18 14:36


Bisacodyl (Dulcolax 10 Mg Supp)  10 mg RC DAILY PRN


   PRN Reason: Constipation


   Stop: 10/20/18 14:36


Camphor/Menthol (Bengay Greaseless 10%-15%)  1 appl TP Q4H PRN


   PRN Reason: Pain (Mild)


   Stop: 10/20/18 14:36


Clopidogrel Bisulfate (Plavix)  75 mg PO DAILY [facility]


   Stop: 10/21/18 08:59


   Last Admin: 18 09:10 Dose:  75 mg


Docusate Sodium (Colace)  100 mg PO DAILY PRN


   PRN Reason: Constipation


   Stop: 10/20/18 14:36


Famotidine (Pepcid)  20 mg PO BID LUZ


   Stop: 10/20/18 16:59


   Last Admin: 18 09:09 Dose:  20 mg


Furosemide (Lasix)  20 mg PO DAILY LUZ


   Stop: 10/21/18 08:59


   Last Admin: 18 09:09 Dose:  20 mg


Gabapentin (Neurontin)  600 mg PO BID LUZ


   Stop: 10/20/18 16:59


   Last Admin: 18 09:08 Dose:  600 mg


Gemfibrozil (Lopid)  600 mg PO BIDAC LUZ


   Stop: 10/20/18 16:29


   Last Admin: 18 08:02 Dose:  600 mg


Glucagon (Glucagen)  1 mg IM PRN PRN


   PRN Reason: IF BS <70


   Stop: 10/20/18 14:36


Hydralazine HCl (Apresoline)  50 mg PO TID LUZ


   Stop: 10/20/18 20:59


   Last Admin: 18 14:16 Dose:  50 mg


Vancomycin HCl 1.5 gm/ Sodium (Chloride)  500 mls @ 250 mls/hr IV Q8H LUZ


   Stop: 10/21/18 16:59


Insulin Aspart (Novolog Insulin Sliding Scale)  0 units SUBQ ACHS LUZ; Protocol


   Stop: 10/20/18 16:29


   Last Admin: 18 11:47 Dose:  6 units


Insulin Detemir (Levemir Insulin)  20 units SUBQ QAM [facility]; Protocol


   Stop: 10/21/18 08:59


   Last Admin: 18 09:18 Dose:  20 units


Insulin Detemir (Levemir Insulin)  35 units SUBQ HS [facility]; Protocol


   Stop: 10/20/18 20:59


   Last Admin: 18 20:21 Dose:  35 units


Lactobacillus Rhamnosus (Culturelle 15b)  1 each PO DAILY [facility]


   Stop: 10/21/18 08:59


   Last Admin: 18 09:10 Dose:  1 each


Lactulose (Cephulac)  20 gm PO TID PRN


   PRN Reason: Constipation


   Stop: 10/20/18 15:17


Losartan Potassium (Cozaar)  100 mg PO DAILY [facility]


   Stop: 10/21/18 08:59


   Last Admin: 18 09:09 Dose:  100 mg


Magnesium Hydroxide (Milk Of Magnesia)  30 ml PO DAILY PRN


   PRN Reason: Constipation


   Stop: 10/20/18 14:36


Metformin HCl (Glucophage)  850 mg PO BID [facility]


   Stop: 10/20/18 16:59


   Last Admin: 18 09:08 Dose:  850 mg


Methocarbamol (Robaxin)  500 mg PO Q6HR [facility]


   Stop: 10/20/18 17:59


   Last Admin: 18 14:16 Dose:  500 mg


Miscellaneous (Vancomycin Iv Per Pharmacy)  1 ea MC PRN [facility]


   Stop: 10/20/18 14:44


Miscellaneous (Probiotic Screen)  1 ea  PRN PRN


   PRN Reason: PROTOCOL


   Stop: 10/21/18 11:44


Morphine Sulfate (Morphine)  1 mg IV Q4HR PRN


   PRN Reason: MODERATE PAIN (level 4-6)


   Stop: 10/20/18 01:19


   Last Admin: 18 08:02 Dose:  1 mg


Morphine Sulfate (Morphine)  2 mg IV Q4HR PRN


   PRN Reason: FOR SEVERE PAIN (LEVEL 7-10)


   Stop: 10/20/18 01:23


   Last Admin: 18 07:34 Dose:  2 mg


Multivitamins/Vitamin C (Theragran)  1 tab PO DAILY [facility]


   Stop: 10/21/18 08:59


   Last Admin: 18 09:08 Dose:  1 tab


Nifedipine (Procardia Xl)  60 mg PO DAILY [facility]


   Stop: 10/21/18 08:59


   Last Admin: 18 09:08 Dose:  60 mg


Ondansetron HCl (Zofran)  4 mg IV Q6HR PRN


   PRN Reason: FOR NAUSEA AND VOMITING


   Stop: 10/20/18 01:24


Pantoprazole Sodium (Protonix)  40 mg PO DAILY LUZ


   Stop: 10/21/18 08:59


   Last Admin: 18 09:10 Dose:  40 mg


Potassium Chloride (Klor-Con)  20 meq PO DAILY LUZ


   Stop: 10/21/18 08:59


   Last Admin: 18 09:10 Dose:  20 meq


Rivaroxaban (Xarelto)  10 mg PO DAILY LUZ


   Stop: 10/21/18 08:59


   Last Admin: 18 09:07 Dose:  10 mg


Sodium Phosphate (Fleet Enema)  135 ml RC DAILY PRN


   PRN Reason: IF MOM OR DULCOLAX INEFFECTIVE


   Stop: 10/20/18 14:36


Terbinafine HCl (Lamisil 1% Cream)  1 appl TP BID [facility]


   Stop: 10/20/18 16:59


   Last Admin: 18 09:06 Dose:  1 appl


Zolpidem Tartrate (Ambien)  10 mg PO HS PRN


   PRN Reason: Insomnia


   Last Admin: 18 21:28 Dose:  10 mg











- Procedures


Procedures: 


 Procedures











Procedure Code Date


 


EXCISION OF DUODENUM, ENDO, DIAGN 0IM30ZT 17


 


EXCISION OF STOMACH, ENDO, DIAGN 6ZU92EW 17


 


REPOSITION LEFT UPPER FEMUR WITH INT FIX, OPEN APPROACH 7QX193R 18


 


TREAT THIGH FRACTURE 58012 18














Nutritional Asmnt/Malnutr-PDOC





- Dietary Evaluation


Malnutrition Findings (Please click <Entered> for more info): 








Nutritional Asmnt/Malnutrition                             Start:  18 15:

59


Text:                                                      Status: Complete    

  


Freq:                                                                          

  


Protocol:                                                                      

  


 Document     18 15:59  LCHENG  (Rec: 18 16:16  [id number]  [id number])


 Nutritional Asmnt/Malnutrition


     Patient General Information


      Nutritional Screening                      High Risk


                                                 Consult


      Diagnosis                                  right foot pain possible


                                                 osteomyelitis, peripheral


      Pertinent Medical Hx/Surgical Hx           DM, CAD, arthirits


      Subjective Information                     Consult received for .


                                                 Pt seen lying in bed at time


                                                 of visit, awake and alert. Pt


                                                 reported good appetite, like


                                                 the food. Pt prefer double


                                                 portion of meat. Pt appeared


                                                 not interested in further


                                                 conversation. Will attempt to


                                                 provide nutrition education


                                                 next time.


      Current Diet Order/ Nutrition Support      [facility] 75


      Pertinent Medications                      colace, pepcid, lasix,


                                                 glucagen, novolog, levemir,


                                                 culturelle, cephulac,


                                                 glucophage, theragran, zofran,


                                                 protonix, kcl, vancomycin


      Pertinent Labs                              Na 135, glucose 308, POC


                                                 282-332


                                                  Na 134, glucose 320, Mg 1


                                                 .7, Alb 4.0


     Nutritional Hx/Data


      Height                                     1.8 m


      Height (Calculated Centimeters)            180.3


      Current Weight (lbs)                       127.913 kg


      Weight (Calculated Kilograms)              127.9


      Weight (Calculated Grams)                  123015.0


      Ideal Body Weight                          172


      Body Mass Index (BMI)                      39.3


      Weight Status                              Obese


     GI Symptoms


      GI Symptoms                                None


      Last BM                                    not indicated


      Difficult in:                              None


      Skin Integrity/Comment:                    right toe nail discoloration


     Estimated Nutritional Goals


      BEE in Kcals:                              Adj wt of IBW


      Calories/Kcals/Kg                          23-27


      Kcals Calculated                           5108-1850


      Protein:                                   Adj wt of IBW


      Protein g/k.8-1


      Protein Calculated                         72-90


      Fluid: ml                                  2070-2430ml (1m/kcal)


     Nutritional Problem


      1. Problem


       Problem                                   altered nutrition related labs


       Etiology                                  hx of DM


       Signs/Symptoms:                           glucose 308-320, -332


     Malnutrition Alert


      Is there a minimum of two criteria         No


       selected?                                 


       Query Text:Check all the applicable       


       criteria. A minimum of two criteria are   


       recommended for diagnosis of either       


       severe or non-severe malnutrition.        


     Malnutrition Related to Morbid Obesity


      Malnutrition related to morbid obesity     No


     Intervention/Recommendation


      Comments                                   1. Continue with [facility] 75


                                                 diet as ordered. Diet


                                                 preference updated. Will


                                                 provide nutrition education


                                                 next time. MD to ajust insulin


                                                 for optimal glycemic control.


                                                 2. Monitor PO intake, wt, labs


                                                 and skin integrity


                                                 3. F/U as high risk in 2-3


                                                 days, -


     Expected Outcomes/Goals


      Expected Outcomes/Goals                    1. PO intake to meet at least


                                                 75% of nutritional needs.


                                                 2. Wt stability, skin to


                                                 remain intact, labs to


                                                 approach WNL.

## 2018-08-22 NOTE — CONSULTATION
DATE OF CONSULTATION:  08/21/2018



SURGICAL CONSULT



REFERRING PHYSICIAN:  Dr. Varela



REASON FOR CONSULTATION:  Question of ischemia, right big toe.



Thank you for referring this patient to me.



This is a 52-year-old obese diabetic male who comes in because of right big toe

changes which could be ischemic, complaining of pain.



The patient has a history of hypertension, diabetes.



The laboratory studies show CBC to be normal.  The chemistry, blood sugar is 332

on admission.  The patient underwent venous study of the lower extremities, no

DVT is noted.  The arterial studies show only moderate vascular insufficiency

with no occlusion.



PHYSICAL EXAMINATION:  NOW, the patient is difficult to interview and tend to

answer very loudly, sometimes irrelevant.  The lower extremities show no

discrete ischemic changes and examination in the big toe shows minimal ischemia

involving the toenail.  No salina gangrenous changes.  There is some tenderness

to touch.



MEDICATIONS:  Now include aspirin and Plavix.



RECOMMENDATIONS:  I do not recommend any surgical treatment at this point in

view of the negative studies present.  We will continue with aspirin and the

Plavix.



We will follow with you.





DD: 08/22/2018 08:55

DT: 08/22/2018 10:42

[facility]# [id number]  [id number]

## 2018-08-23 LAB
ALBUMIN SERPL-MCNC: 4 GM/DL (ref 4.2–5.5)
ALBUMIN/GLOB SERPL: 1.5 {RATIO} (ref 1–1.8)
ALP SERPL-CCNC: 68 U/L (ref 34–104)
ALT SERPL-CCNC: 33 U/L (ref 7–52)
ANION GAP SERPL CALC-SCNC: 13.6 MMOL/L (ref 7–16)
AST SERPL-CCNC: 44 U/L (ref 13–39)
BASOPHILS # BLD AUTO: 0.1 TH/CUMM (ref 0–0.2)
BASOPHILS NFR BLD AUTO: 0.8 % (ref 0–2)
BILIRUB SERPL-MCNC: 0.2 MG/DL (ref 0.3–1)
BUN SERPL-MCNC: 14 MG/DL (ref 7–25)
CALCIUM SERPL-MCNC: 9.1 MG/DL (ref 8.6–10.3)
CHLORIDE SERPL-SCNC: 100 MEQ/L (ref 98–107)
CO2 SERPL-SCNC: 25.2 MEQ/L (ref 21–31)
CREAT SERPL-MCNC: 0.7 MG/DL (ref 0.7–1.3)
EOSINOPHIL # BLD AUTO: 0.1 TH/CMM (ref 0.1–0.4)
EOSINOPHIL NFR BLD AUTO: 1.5 % (ref 0–5)
ERYTHROCYTE [DISTWIDTH] IN BLOOD BY AUTOMATED COUNT: 13.1 % (ref 11.5–20)
GLOBULIN SER-MCNC: 2.6 GM/DL
GLUCOSE SERPL-MCNC: 312 MG/DL (ref 70–105)
HCT VFR BLD CALC: 35.8 % (ref 41–60)
HGB BLD-MCNC: 12.5 GM/DL (ref 12–16)
LYMPHOCYTE AB SER FC-ACNC: 1.9 TH/CMM (ref 1.5–3)
LYMPHOCYTES NFR BLD AUTO: 23.7 % (ref 20–50)
MCH RBC QN AUTO: 27.5 PG (ref 26–30)
MCHC RBC AUTO-ENTMCNC: 34.9 PG (ref 28–36)
MCV RBC AUTO: 78.8 FL (ref 80–99)
MONOCYTES # BLD AUTO: 0.8 TH/CMM (ref 0.3–1)
MONOCYTES NFR BLD AUTO: 10.4 % (ref 2–10)
NEUTROPHILS # BLD: 5.2 TH/CMM (ref 1.8–8)
NEUTROPHILS NFR BLD AUTO: 63.6 % (ref 40–80)
PLATELET # BLD: 322 TH/CMM (ref 150–400)
PMV BLD AUTO: 7.4 FL
POTASSIUM SERPL-SCNC: 3.8 MEQ/L (ref 3.5–5.1)
RBC # BLD AUTO: 4.54 MIL/CMM (ref 4.3–5.7)
SODIUM SERPL-SCNC: 135 MEQ/L (ref 136–145)
WBC # BLD AUTO: 8.1 TH/CMM (ref 4.8–10.8)

## 2018-08-23 RX ADMIN — HYDROCODONE BITARTRATE AND ACETAMINOPHEN PRN TAB: 5; 325 TABLET ORAL at 21:25

## 2018-08-23 RX ADMIN — INSULIN DETEMIR SCH UNITS: 100 INJECTION, SOLUTION SUBCUTANEOUS at 09:21

## 2018-08-23 RX ADMIN — INSULIN ASPART SCH UNITS: 100 INJECTION, SOLUTION INTRAVENOUS; SUBCUTANEOUS at 12:41

## 2018-08-23 RX ADMIN — TERBINAFINE HYDROCHLORIDE SCH APPL: 1 CREAM TOPICAL at 17:18

## 2018-08-23 RX ADMIN — INSULIN ASPART SCH UNITS: 100 INJECTION, SOLUTION INTRAVENOUS; SUBCUTANEOUS at 21:25

## 2018-08-23 RX ADMIN — HYDROCODONE BITARTRATE AND ACETAMINOPHEN PRN TAB: 5; 325 TABLET ORAL at 09:37

## 2018-08-23 RX ADMIN — POTASSIUM CHLORIDE SCH MEQ: 20 TABLET, EXTENDED RELEASE ORAL at 09:14

## 2018-08-23 RX ADMIN — Medication SCH EACH: at 09:12

## 2018-08-23 RX ADMIN — INSULIN DETEMIR SCH UNITS: 100 INJECTION, SOLUTION SUBCUTANEOUS at 21:27

## 2018-08-23 RX ADMIN — TERBINAFINE HYDROCHLORIDE SCH: 1 CREAM TOPICAL at 15:23

## 2018-08-23 RX ADMIN — PANTOPRAZOLE SODIUM SCH MG: 40 TABLET, DELAYED RELEASE ORAL at 09:14

## 2018-08-23 RX ADMIN — TERBINAFINE HYDROCHLORIDE SCH APPL: 1 CREAM TOPICAL at 12:52

## 2018-08-23 RX ADMIN — INSULIN ASPART SCH UNITS: 100 INJECTION, SOLUTION INTRAVENOUS; SUBCUTANEOUS at 17:24

## 2018-08-23 RX ADMIN — ASPIRIN 81 MG SCH MG: 81 TABLET ORAL at 09:14

## 2018-08-23 RX ADMIN — NIFEDIPINE SCH MG: 30 TABLET, FILM COATED, EXTENDED RELEASE ORAL at 09:37

## 2018-08-23 RX ADMIN — INSULIN ASPART SCH UNITS: 100 INJECTION, SOLUTION INTRAVENOUS; SUBCUTANEOUS at 06:46

## 2018-08-23 NOTE — CONSULTATION
DATE OF CONSULTATION:  08/23/2018



REASON FOR CONSULTATION:  Difficulty urinating, having to strain, feeling of

incomplete emptying.  The patient also admits to nocturia x 2 minimum depending

on the fluid intake.  His symptoms are recent.  Denies history of UTI,

hematuria, prostate surgery, bladder problems, or kidney stones.  He has had a

stroke in the past, leaving him weak on the left side and also has diabetes,

which could be contributing.



HOME MEDICATIONS:  Nebulizers, Lasix, insulin, lactulose, Ambien, aspirin,

Dulcolax, Plavix, Colace, Pepcid, enemas, Neurontin, Lopid, Apresoline,

losartan, milk of magnesia, metformin, Robaxin, Procardia-XL, Protonix,

potassium chloride, Xarelto.



ALLERGIES:  None.



SURGICAL HISTORY:  Abdominal exploration following an accident many, many years

ago.  Femur fracture.



MEDICAL HISTORY:  Stroke as mentioned earlier, diabetes, hypertension,

hyperlipidemia, and probably coronary artery disease as he is taking Plavix and

aspirin.  Cardiology evaluation indicates he does have this including COPD and

sleep apnea.



REVIEW OF SYSTEMS:  No fever, weight loss, headache, or seizures.  Denies sore

throat or vision change.  No chest pain, coughing or shortness of breath.  No

abdominal pain, vomiting or diarrhea.  He does have constipation as indicated by

the number of medications that he has to take including enemas.  No skin or

joint problems.  He is ambulatory, but walks with slight limp and difficulty and

only short distances due to the left-sided weakness.



PHYSICAL EXAMINATION:

GENERAL:  On exam, he is a moderately obese, BMI of 37.1.  He is awake, alert,

in no distress.

VITAL SIGNS:  Temperature 98, heart rate 85, blood pressure 162/87, no fever

recorded in the hospital.

HEAD AND NECK:  Normocephalic.  Trachea central.  Pupils equal and reactive.  No

jaundice.  Thyroid and lymph nodes not palpable.  Carotid bruit absent.

CHEST:  Symmetrical.

LUNGS:  Clear.  No rales or rhonchi.

HEART:  Sounds normal in sinus rhythm with a soft systolic murmur.

ABDOMEN:  Morbidly obese, soft, nontender.  No masses or hernia can be felt. 

Midline scar is noted.

GENITALIA:  Normal male, no scrotal masses.  Circumcised penis.  Rectally, the

prostate is small, smooth, benign and nontender with normal sphincter tone.  The

prostate is less than 20 grams.

EXTREMITIES:  The patient has a trace to 1+ edema and in nail infection or toe

infection for which he was primarily admitted involving right toe.  He is being

treated by Infectious Disease and General Surgery for this and apparently

osteomyelitis has been ruled out.  He is getting vancomycin as well.

NEUROLOGIC:  Left-sided weakness, worse than the leg.



LAB:  No urine tests have been done.  White count is normal at 8.1, hemoglobin

12.5, platelets are adequate.  Sodium 135.  Electrolytes otherwise normal.  BUN

14, creatinine 0.7.  Sugar is not controlled 369, 312, 326, and 307.  Liver

functions grossly normal.  Mild elevation of AST at 44.  Ultrasound of lower

extremities shows no evidence of deep vein thrombosis and arterial study of the

lower extremities shows moderate atherosclerosis involving most lower extremity

vasculature, left greater than right.  No evidence of any occlusion.



IMPRESSION:

1.  Urinary difficulty straining and dysuria, most likely related to neurogenic

bladder, which could be from a combination of stroke, diabetes, and constipation

and limited mobility.  Recommend pre and postvoid bladder ultrasound and further

planning depending on the findings.

2.  Diabetes, poorly controlled.

3.  Hypertension, fair control.

4.  History of stroke, recovering.

5.  Gastritis, on dual medication.

6.  Coronary artery disease, on significant anticoagulation.

7.  Obstructive sleep apnea.  All indicate multiple risk factors and

comorbidities.  Also, the patient is a resident of a nursing home due to above

problems.



Thank you for the referral.





DD: 08/23/2018 12:44

DT: 08/23/2018 17:03

JOB# [id number]  [id number]

## 2018-08-23 NOTE — INFECTIOUS DISEASE PROG NOTE
Infectious Disease Subjective





- Review of Systems


Service Date: 18


Subjective: 





No new change, no fever.





Infectious Disease Objective





- Results


Result Diagrams: 


 18 07:57





 18 07:57


Recent Labs: 


 Laboratory Last Values











WBC  8.1 Th/cmm (4.8-10.8)   18  07:57    


 


RBC  4.54 Mil/cmm (4.30-5.70)   18  07:57    


 


Hgb  12.5 gm/dL (12-16)   18  07:57    


 


Hct  35.8 % (41.0-60)  L  18  07:57    


 


MCV  78.8 fl (80-99)  L  18  07:57    


 


MCH  27.5 pg (26.0-30.0)   18  07:57    


 


MCHC Differential  34.9 pg (28.0-36.0)   18  07:57    


 


RDW  13.1 % (11.5-20.0)   18  07:57    


 


Plt Count  322 Th/cmm (150-400)   18  07:57    


 


MPV  7.4 fl  18  07:57    


 


Add Manual Diff  YES   18  08:04    


 


Neutrophils %  63.6 % (40.0-80.0)   18  07:57    


 


Band Neutrophils %  1 % (0-10)   18  08:04    


 


Lymphocytes %  23.7 % (20.0-50.0)   18  07:57    


 


Monocytes %  10.4 % (2.0-10.0)  H  18  07:57    


 


Eosinophils %  1.5 % (0.0-5.0)   18  07:57    


 


Basophils %  0.8 % (0.0-2.0)   18  07:57    


 


Neutrophils (Manual)  65 % (40-80)   18  08:04    


 


Lymphocytes  27 % (20-50)   18  08:04    


 


Monocytes  6 % (2-10)   18  08:04    


 


Eosinophils  1 % (0-5)   18  08:04    


 


Basophils  0 % (0-3)   18  08:04    


 


D-Dimer  < 100 ng/mL (100-400)  L  18  23:40    


 


Sodium  135 mEq/L (136-145)  L  18  07:57    


 


Potassium  3.8 mEq/L (3.5-5.1)   18  07:57    


 


Chloride  100 mEq/L ()   18  07:57    


 


Carbon Dioxide  25.2 mEq/L (21.0-31.0)   18  07:57    


 


Anion Gap  13.6  (7.0-16.0)   18  07:57    


 


BUN  14 mg/dL (7-25)   18  07:57    


 


Creatinine  0.7 mg/dL (0.7-1.3)   18  07:57    


 


Est GFR ( Amer)  > 60.0 ml/min (>90)   18  07:57    


 


Est GFR (Non-Af Amer)  > 60.0 ml/min  18  07:57    


 


BUN/Creatinine Ratio  20.0   18  07:57    


 


Glucose  312 mg/dL ()  H  18  07:57    


 


POC Glucose  369 MG/DL (70 - 105)  H  18  12:17    


 


Hemoglobin A1c %  10.1 % (4.0-6.0)  H  18  23:40    


 


Uric Acid  4.2 mg/dL (4.4-7.6)  L  18  23:40    


 


Calcium  9.1 mg/dL (8.6-10.3)   18  07:57    


 


Phosphorus  3.3 mg/dL (2.5-5.0)   18  23:40    


 


Magnesium  1.7 mg/dL (1.9-2.7)  L  18  23:40    


 


Total Bilirubin  0.2 mg/dL (0.3-1.0)  L  18  07:57    


 


AST  44 U/L (13-39)  H  18  07:57    


 


ALT  33 U/L (7-52)   18  07:57    


 


Alkaline Phosphatase  68 U/L ()   18  07:57    


 


Troponin I  0.01 ng/mL (0.01-0.05)   18  01:10    


 


B-Natriuretic Peptide  < 5.0 pg/mL (5.0-100.0)  L  18  08:20    


 


Total Protein  6.6 gm/dL (6.0-8.3)   18  07:57    


 


Albumin  4.0 gm/dL (4.2-5.5)  L  18  07:57    


 


Globulin  2.6 gm/dL  18  07:57    


 


Albumin/Globulin Ratio  1.5  (1.0-1.8)   18  07:57    


 


Triglycerides  1016 mg/dL (<150)  H  18  08:20    


 


Cholesterol  193 mg/dL (<200)   18  08:20    


 


LDL Cholesterol Direct  69 mg/dL ()  L  18  08:20    


 


HDL Cholesterol  20 mg/dL (23-92)  L  18  08:20    


 


TSH  1.91 uIU/ml (0.34-5.60)   18  08:20    


 


Vancomycin Trough  12.7 ug/mL (5-10)  H  18  07:57    














- Physical Exam


Vitals and I&O: 


 Vital Signs











Temp  98.0 F   18 11:47


 


Pulse  85   18 11:47


 


Resp  18   18 11:47


 


BP  162/87   18 11:47


 


Pulse Ox  96   18 11:47








 Intake & Output











 18





 18:59 06:59 18:59


 


Intake Total 1850 500 


 


Output Total 1600  


 


Balance 250 500 


 


Weight (lbs) 124.738 kg 121.018 kg 120.656 kg


 


Intake:   


 


  Intake, IV Amount 500 500 


 


    Vancomycin HCl 1.5 gm In 500 500 





    Sodium Chloride 0.9% 500   





    ml @ 250 mls/hr IV Q8H   





    [facility] Rx#:[id number]   


 


  Oral 1350  


 


Output:   


 


  Urine 1600  


 


Other:   


 


  # Bowel Movements 2  


 


  Stool Characteristics Soft Soft 


 


  Weight Source Bedscale Bedscale Patient stated











Active Medications: 


Current Medications





Acetaminophen (Tylenol)  650 mg PO Q4H PRN


   PRN Reason: PAIN OR FEVER >100.4


   Stop: 10/20/18 14:36


Acetaminophen/Hydrocodone Bitart (Norco 5mg/325mg)  1 tab PO Q6H PRN


   PRN Reason: mild pain


   Stop: 10/20/18 14:41


   Last Admin: 18 09:37 Dose:  1 tab


Albuterol Sulfate (Albuterol 2.5mg/3ml Neb Ud)  2.5 mg HHN Q4HRT PRN


   PRN Reason: Shortness of Breath


   Stop: 10/20/18 14:36


Aspirin (Aspirin Chewable)  81 mg PO DAILY [facility]


   Stop: 10/21/18 08:59


   Last Admin: 18 09:14 Dose:  81 mg


Benzocaine/Menthol (Cepacol)  1 josiah MM Q4HR PRN


   PRN Reason: Sore Throat


   Stop: 10/20/18 14:36


Bisacodyl (Dulcolax 10 Mg Supp)  10 mg RC DAILY PRN


   PRN Reason: Constipation


   Stop: 10/20/18 14:36


Camphor/Menthol (Bengay Greaseless 10%-15%)  1 appl TP Q4H PRN


   PRN Reason: Pain (Mild)


   Stop: 10/20/18 14:36


Clopidogrel Bisulfate (Plavix)  75 mg PO DAILY [facility]


   Stop: 10/21/18 08:59


   Last Admin: 18 09:12 Dose:  75 mg


Docusate Sodium (Colace)  100 mg PO DAILY PRN


   PRN Reason: Constipation


   Stop: 10/20/18 14:36


Famotidine (Pepcid)  20 mg PO BID [facility]


   Stop: 10/20/18 16:59


   Last Admin: 18 09:13 Dose:  20 mg


Furosemide (Lasix)  20 mg PO DAILY [facility]


   Stop: 10/21/18 08:59


   Last Admin: 18 09:08 Dose:  20 mg


Gabapentin (Neurontin)  600 mg PO BID [facility]


   Stop: 10/20/18 16:59


   Last Admin: 18 09:12 Dose:  600 mg


Gemfibrozil (Lopid)  600 mg PO BIDAC [facility]


   Stop: 10/20/18 16:29


   Last Admin: 18 12:51 Dose:  600 mg


Glucagon (Glucagen)  1 mg IM PRN PRN


   PRN Reason: IF BS <70


   Stop: 10/20/18 14:36


Hydralazine HCl (Apresoline)  50 mg PO TID [facility]


   Stop: 10/20/18 20:59


   Last Admin: 18 09:38 Dose:  50 mg


Vancomycin HCl 1.5 gm/ Sodium (Chloride)  500 mls @ 250 mls/hr IV Q8H [facility]


   Stop: 10/21/18 16:59


   Last Admin: 18 09:41 Dose:  250 mls/hr


Insulin Aspart (Novolog Insulin Sliding Scale)  0 units SUBQ ACHS [facility]; Protocol


   Stop: 10/20/18 16:29


   Last Admin: 18 12:41 Dose:  9 units


Insulin Detemir (Levemir Insulin)  20 units SUBQ QAM [facility]; Protocol


   Stop: 10/21/18 08:59


   Last Admin: 18 09:21 Dose:  20 units


Insulin Detemir (Levemir Insulin)  35 units SUBQ HS [facility]; Protocol


   Stop: 10/20/18 20:59


   Last Admin: 18 20:44 Dose:  35 units


Lactobacillus Rhamnosus (Culturelle 15b)  1 each PO DAILY [facility]


   Stop: 10/21/18 08:59


   Last Admin: 18 09:12 Dose:  1 each


Lactulose (Cephulac)  20 gm PO TID PRN


   PRN Reason: Constipation


   Stop: 10/20/18 15:17


Losartan Potassium (Cozaar)  100 mg PO DAILY [facility]


   Stop: 10/21/18 08:59


   Last Admin: 18 09:12 Dose:  100 mg


Magnesium Hydroxide (Milk Of Magnesia)  30 ml PO DAILY PRN


   PRN Reason: Constipation


   Stop: 10/20/18 14:36


Metformin HCl (Glucophage)  850 mg PO BID [facility]


   Stop: 10/20/18 16:59


   Last Admin: 18 09:13 Dose:  850 mg


Methocarbamol (Robaxin)  500 mg PO Q6HR [facility]


   Stop: 10/20/18 17:59


   Last Admin: 18 12:52 Dose:  500 mg


Miscellaneous (Vancomycin Iv Per Pharmacy)  1 ea MC PRN [facility]


   Stop: 10/20/18 14:44


Miscellaneous (Probiotic Screen)  1 ea MC PRN PRN


   PRN Reason: PROTOCOL


   Stop: 10/21/18 11:44


Morphine Sulfate (Morphine)  1 mg IV Q4HR PRN


   PRN Reason: MODERATE PAIN (level 4-6)


   Stop: 10/20/18 01:19


   Last Admin: 18 08:02 Dose:  1 mg


Morphine Sulfate (Morphine)  2 mg IV Q4HR PRN


   PRN Reason: FOR SEVERE PAIN (LEVEL 7-10)


   Stop: 10/20/18 01:23


   Last Admin: 18 21:41 Dose:  2 mg


Multivitamins/Vitamin C (Theragran)  1 tab PO DAILY [facility]


   Stop: 10/21/18 08:59


   Last Admin: 18 09:13 Dose:  1 tab


Nifedipine (Procardia Xl)  60 mg PO DAILY [facility]


   Stop: 10/21/18 08:59


   Last Admin: 18 09:37 Dose:  60 mg


Ondansetron HCl (Zofran)  4 mg IV Q6HR PRN


   PRN Reason: FOR NAUSEA AND VOMITING


   Stop: 10/20/18 01:24


Pantoprazole Sodium (Protonix)  40 mg PO DAILY [facility]


   Stop: 10/21/18 08:59


   Last Admin: 18 09:14 Dose:  40 mg


Potassium Chloride (Klor-Con)  20 meq PO DAILY [facility]


   Stop: 10/21/18 08:59


   Last Admin: 18 09:14 Dose:  20 meq


Rivaroxaban (Xarelto)  10 mg PO DAILY [facility]


   Stop: 10/21/18 08:59


   Last Admin: 18 09:13 Dose:  10 mg


Sodium Phosphate (Fleet Enema)  135 ml RC DAILY PRN


   PRN Reason: IF MOM OR DULCOLAX INEFFECTIVE


   Stop: 10/20/18 14:36


Tamsulosin HCl (Flomax)  0.4 mg PO DAILY [facility]


   Stop: 10/22/18 08:59


   Last Admin: 18 09:13 Dose:  0.4 mg


Terbinafine HCl (Lamisil 1% Cream)  1 appl TP BID [facility]


   Stop: 10/20/18 16:59


   Last Admin: 18 12:52 Dose:  1 appl


Zolpidem Tartrate (Ambien)  10 mg PO HS PRN


   PRN Reason: Insomnia


   Last Admin: 18 23:55 Dose:  10 mg








General: no acute distress, well developed, well nourished


HEENT: atraumatic, normocephalic, PERRLA, EOMI


Neck: supple, no thyromegaly


Cardiovascular: S1S2, regular


Lungs: clear to auscultation bilaterally, clear to percussion


Abdomen: soft, no tender


Extremities: no cyanosis, no clubbing, no edema





- Procedures


Procedures: 


 Procedures











Procedure Code Date


 


EXCISION OF DUODENUM, ENDO, DIAGN 4JD63SX 17


 


EXCISION OF STOMACH, ENDO, DIAGN 0IH14UO 17


 


REPOSITION LEFT UPPER FEMUR WITH INT FIX, OPEN APPROACH 9VD701U 18


 


TREAT THIGH FRACTURE 82311 18














Infectious Disease Assmt/Plan





- Assessment


Assessment: 





1. tinea pedis.


2. DM2.








- Plan


Plan: 





CPM.





Nutritional Asmnt/Malnutr-PDOC





- Dietary Evaluation


Malnutrition Findings (Please click <Entered> for more info): 








Nutritional Asmnt/Malnutrition                             Start:  18 15:

59


Text:                                                      Status: Complete    

  


Freq:                                                                          

  


Protocol:                                                                      

  


 Document     18 15:59  LCHENG  (Rec: 18 16:16  LCHENG  [id number])


 Nutritional Asmnt/Malnutrition


     Patient General Information


      Nutritional Screening                      High Risk


                                                 Consult


      Diagnosis                                  right foot pain possible


                                                 osteomyelitis, peripheral


      Pertinent Medical Hx/Surgical Hx           DM, CAD, arthirits


      Subjective Information                     Consult received for .


                                                 Pt seen lying in bed at time


                                                 of visit, awake and alert. Pt


                                                 reported good appetite, like


                                                 the food. Pt prefer double


                                                 portion of meat. Pt appeared


                                                 not interested in further


                                                 conversation. Will attempt to


                                                 provide nutrition education


                                                 next time.


      [facility]/ Nutrition Support      [facility]


      Pertinent Medications                      colace, pepcid, lasix,


                                                 glucagen, novolog, levemir,


                                                 culturelle, cephulac,


                                                 glucophage, theragran, zofran,


                                                 protonix, kcl, vancomycin


      Pertinent Labs                              Na 135, glucose 308, POC


                                                 282-332


                                                  Na 134, glucose 320, Mg 1


                                                 .7, Alb 4.0


     Nutritional Hx/Data


      Height                                     1.8 m


      Height (Calculated Centimeters)            180.3


      Current Weight (lbs)                       127.913 kg


      Weight (Calculated Kilograms)              127.9


      Weight (Calculated Grams)                  614029.0


      Ideal Body Weight                          172


      Body Mass Index (BMI)                      39.3


      Weight Status                              Obese


     GI Symptoms


      GI Symptoms                                None


      Last BM                                    not indicated


      Difficult in:                              None


      Skin Integrity/Comment:                    right toe nail discoloration


     Estimated Nutritional Goals


      BEE in Kcals:                              Adj wt of IBW


      Calories/Kcals/Kg                          23-27


      Kcals Calculated                           6342-1723


      Protein:                                   Adj wt of IBW


      Protein g/k.8-1


      Protein Calculated                         72-90


      Fluid: ml                                  -243ml (1m/kcal)


     Nutritional Problem


      1. Problem


       Problem                                   altered nutrition related labs


       Etiology                                  hx of DM


       Signs/Symptoms:                           glucose 308-320, -332


     Malnutrition Alert


      Is there a minimum of two criteria         No


       selected?                                 


       Query Text:Check all the applicable       


       criteria. A minimum of two criteria are   


       recommended for diagnosis of either       


       severe or non-severe malnutrition.        


     Malnutrition Related to Morbid Obesity


      Malnutrition related to morbid obesity     No


     Intervention/Recommendation


      Comments                                   1. Continue with [facility] 75gm


                                                 diet as ordered. Diet


                                                 preference updated. Will


                                                 provide nutrition education


                                                 next time. MD to ajust insulin


                                                 for optimal glycemic control.


                                                 2. Monitor PO intake, wt, labs


                                                 and skin integrity


                                                 3. F/U as high risk in 2-3


                                                 days, -


     Expected Outcomes/Goals


      Expected Outcomes/Goals                    1. PO intake to meet at least


                                                 75% of nutritional needs.


                                                 2. Wt stability, skin to


                                                 remain intact, labs to


                                                 approach WNL.

## 2018-08-24 RX ADMIN — POTASSIUM CHLORIDE SCH MEQ: 20 TABLET, EXTENDED RELEASE ORAL at 08:21

## 2018-08-24 RX ADMIN — INSULIN ASPART SCH UNITS: 100 INJECTION, SOLUTION INTRAVENOUS; SUBCUTANEOUS at 16:39

## 2018-08-24 RX ADMIN — HYDROCODONE BITARTRATE AND ACETAMINOPHEN PRN TAB: 5; 325 TABLET ORAL at 13:57

## 2018-08-24 RX ADMIN — NIFEDIPINE SCH MG: 30 TABLET, FILM COATED, EXTENDED RELEASE ORAL at 08:22

## 2018-08-24 RX ADMIN — INSULIN DETEMIR SCH UNITS: 100 INJECTION, SOLUTION SUBCUTANEOUS at 08:17

## 2018-08-24 RX ADMIN — Medication SCH EACH: at 08:20

## 2018-08-24 RX ADMIN — INSULIN ASPART SCH UNITS: 100 INJECTION, SOLUTION INTRAVENOUS; SUBCUTANEOUS at 08:16

## 2018-08-24 RX ADMIN — TERBINAFINE HYDROCHLORIDE SCH APPL: 1 CREAM TOPICAL at 16:56

## 2018-08-24 RX ADMIN — TERBINAFINE HYDROCHLORIDE SCH APPL: 1 CREAM TOPICAL at 08:39

## 2018-08-24 RX ADMIN — ASPIRIN 81 MG SCH MG: 81 TABLET ORAL at 08:23

## 2018-08-24 RX ADMIN — PANTOPRAZOLE SODIUM SCH MG: 40 TABLET, DELAYED RELEASE ORAL at 08:20

## 2018-08-24 RX ADMIN — INSULIN ASPART SCH UNITS: 100 INJECTION, SOLUTION INTRAVENOUS; SUBCUTANEOUS at 11:32

## 2018-08-24 RX ADMIN — HYDROCODONE BITARTRATE AND ACETAMINOPHEN PRN TAB: 5; 325 TABLET ORAL at 08:23

## 2018-08-24 NOTE — DIAGNOSTIC IMAGING REPORT
Ultrasound urinary bladder



HISTORY: Difficulty in urination



No intraluminal abnormality seen within the urinary bladder.  No wall

thickening or other focal abnormalities.  There is an increase in the

post void urine residual (10 8 mL).



No other definite abnormal masses seen within the pelvis.  The prostate

gland is not well delineated.



IMPRESSION:

1.  Increased postvoid urine residual (10 8 mL).

## 2018-08-24 NOTE — PROGRESS NOTES
DATE:  



UROLOGY FOLLOWUP



SUBJECTIVE:  The patient is voiding better today, less problems.  No complaints.

 Ultrasound showed a postvoid residual of 108 mL.  His toe infection has

improved with antifungal treatment after osteomyelitis was ruled out.



PHYSICAL EXAMINATION:

VITAL SIGNS:  On exam, blood pressure 109/69, temperature 98.8, heart rate 103,

respirations 20, saturating 95%.

ABDOMEN:  Obese, but soft and nontender.  Bladder not palpable and nondistended.

EXTREMITIES:  Trace edema.

LUNGS:  No rales or rhonchi.



LABORATORY DATA:  White count 8.1, hemoglobin 12.5.



IMPRESSION:

1.  Lower urinary tract symptoms consistent with neurogenic bladder.  Recommend

continue Flomax or whatever little benefit it can offer.  Cystoscopy as an

outpatient if necessary.  The patient can be seen in the Urology office in 3

months.

2.  Neurogenic bladder, needs followup.

3.  Diabetes, needs better control.

4.  Hypertension, stable.

5.  History of stroke.  Continue therapy.

6.  History of coronary artery disease, currently stable.

7.  History of chronic obstructive pulmonary disease and sleep apnea.





DD: 08/24/2018 18:12

DT: 08/24/2018 21:02

[facility]# [id number]  [id number]

## 2018-08-31 NOTE — DISCHARGE SUMMARY
DATE OF DISCHARGE:  08/24/2018



The patient was admitted to [facility] on 08/21/2018 for

increasing weakness on the right side as well as severe pain.  The patient was

discharged on 08/24/2018 back to [place].



HISTORY:  The patient was complaining of severe right foot pain.  The patient is

known to have history of CVA, history of diabetes, history of arthritis and left

hemiparesis.  The patient was admitted and had a complete workup done including

arterial and venous ultrasounds and Dr. Beard consult, the patient improved and

his weakness is still there.  We will give him a chance to go to the [facility], but

the patient refused.  The patient was sent back to the [facility].  The

patient came with initial diagnoses of left hemiparesis, right foot pain,

peripheral vascular disease, history of diabetes, history of pulmonary artery

disease, history of arthritis.



THE PATIENT CONDITION AT TIME OF DISCHARGE:  Stable.



MEDICATIONS:  See the reconciliation sheet.



Activity as tolerated.  I will continue the PT in the nursing home.





DD: 08/31/2018 12:55

DT: 08/31/2018 20:49

[facility]# [id number]  [id number]

## 2019-01-11 ENCOUNTER — HOSPITAL ENCOUNTER (INPATIENT)
Dept: HOSPITAL 36 - ER | Age: 54
LOS: 3 days | Discharge: NURSING, REHAB OR LONG-TERM CARE | DRG: 177 | End: 2019-01-14
Attending: FAMILY MEDICINE | Admitting: FAMILY MEDICINE
Payer: MEDICARE

## 2019-01-11 VITALS — SYSTOLIC BLOOD PRESSURE: 160 MMHG | DIASTOLIC BLOOD PRESSURE: 76 MMHG

## 2019-01-11 DIAGNOSIS — Z82.49: ICD-10-CM

## 2019-01-11 DIAGNOSIS — I10: ICD-10-CM

## 2019-01-11 DIAGNOSIS — J69.0: Primary | ICD-10-CM

## 2019-01-11 DIAGNOSIS — N40.0: ICD-10-CM

## 2019-01-11 DIAGNOSIS — Z83.3: ICD-10-CM

## 2019-01-11 DIAGNOSIS — I69.354: ICD-10-CM

## 2019-01-11 DIAGNOSIS — E66.9: ICD-10-CM

## 2019-01-11 DIAGNOSIS — J96.00: ICD-10-CM

## 2019-01-11 DIAGNOSIS — E78.5: ICD-10-CM

## 2019-01-11 DIAGNOSIS — D47.3: ICD-10-CM

## 2019-01-11 DIAGNOSIS — I25.10: ICD-10-CM

## 2019-01-11 DIAGNOSIS — K21.9: ICD-10-CM

## 2019-01-11 DIAGNOSIS — D64.9: ICD-10-CM

## 2019-01-11 DIAGNOSIS — G47.33: ICD-10-CM

## 2019-01-11 DIAGNOSIS — E11.65: ICD-10-CM

## 2019-01-11 DIAGNOSIS — E11.40: ICD-10-CM

## 2019-01-11 DIAGNOSIS — J44.0: ICD-10-CM

## 2019-01-11 LAB
ALBUMIN SERPL-MCNC: 4.1 GM/DL (ref 4.2–5.5)
ALBUMIN/GLOB SERPL: 1.3 {RATIO} (ref 1–1.8)
ALP SERPL-CCNC: 78 U/L (ref 34–104)
ALT SERPL-CCNC: 30 U/L (ref 7–52)
AMPHET UR-MCNC: NEGATIVE NG/ML
ANION GAP SERPL CALC-SCNC: 13.5 MMOL/L (ref 7–16)
APPEARANCE UR: CLEAR
AST SERPL-CCNC: 31 U/L (ref 13–39)
BACTERIA #/AREA URNS HPF: (no result) /HPF
BARBITURATES UR-MCNC: NEGATIVE UG/ML
BASOPHILS # BLD AUTO: 0 TH/CUMM (ref 0–0.2)
BASOPHILS NFR BLD AUTO: 0.3 % (ref 0–2)
BENZODIAZEPINES PNL UR: NEGATIVE
BILIRUB SERPL-MCNC: 0.2 MG/DL (ref 0.3–1)
BILIRUB UR-MCNC: NEGATIVE MG/DL
BUN SERPL-MCNC: 15 MG/DL (ref 7–25)
CALCIUM SERPL-MCNC: 9.1 MG/DL (ref 8.6–10.3)
CANNABINOIDS SERPL QL CFM: NEGATIVE
CHLORIDE SERPL-SCNC: 101 MEQ/L (ref 98–107)
CO2 SERPL-SCNC: 24.7 MEQ/L (ref 21–31)
COCAINE METAB.OTHER UR-MCNC: NEGATIVE NG/ML
COLOR UR: YELLOW
CREAT SERPL-MCNC: 0.8 MG/DL (ref 0.7–1.3)
EOSINOPHIL # BLD AUTO: 0.1 TH/CMM (ref 0.1–0.4)
EOSINOPHIL NFR BLD AUTO: 2 % (ref 0–5)
EPI CELLS URNS QL MICRO: (no result) /LPF
ERYTHROCYTE [DISTWIDTH] IN BLOOD BY AUTOMATED COUNT: 14.6 % (ref 11.5–20)
GLOBULIN SER-MCNC: 3.1 GM/DL
GLUCOSE SERPL-MCNC: 314 MG/DL (ref 70–105)
GLUCOSE UR STRIP-MCNC: >=1000 MG/DL
HCT VFR BLD CALC: 31.9 % (ref 41–60)
HGB BLD-MCNC: 10.1 GM/DL (ref 12–16)
KETONES UR STRIP-MCNC: NEGATIVE MG/DL
LEUKOCYTE ESTERASE UR-ACNC: NEGATIVE
LYMPHOCYTE AB SER FC-ACNC: 1.6 TH/CMM (ref 1.5–3)
LYMPHOCYTES NFR BLD AUTO: 24.5 % (ref 20–50)
MAGNESIUM SERPL-MCNC: 1.9 MG/DL (ref 1.9–2.7)
MCH RBC QN AUTO: 23 PG (ref 26–30)
MCHC RBC AUTO-ENTMCNC: 31.8 PG (ref 28–36)
MCV RBC AUTO: 72.4 FL (ref 80–99)
METHADONE UR CFM-MCNC: NEGATIVE NG/ML
METHAMPHET UR QL: NEGATIVE
MICRO URNS: YES
MONOCYTES # BLD AUTO: 0.9 TH/CMM (ref 0.3–1)
MONOCYTES NFR BLD AUTO: 13.2 % (ref 2–10)
NEUTROPHILS # BLD: 3.9 TH/CMM (ref 1.8–8)
NEUTROPHILS NFR BLD AUTO: 60 % (ref 40–80)
NITRITE UR QL STRIP: NEGATIVE
OPIATES UR QL: POSITIVE
PCP UR-MCNC: NEGATIVE UG/L
PH UR STRIP: 6 [PH] (ref 4.6–8)
PHOSPHATE SERPL-MCNC: 3.3 MG/DL (ref 2.5–5)
PLATELET # BLD: 450 TH/CMM (ref 150–400)
PMV BLD AUTO: 7 FL
POTASSIUM SERPL-SCNC: 4.2 MEQ/L (ref 3.5–5.1)
PROT UR STRIP-MCNC: NEGATIVE MG/DL
RBC # BLD AUTO: 4.41 MIL/CMM (ref 4.3–5.7)
RBC # UR STRIP: NEGATIVE /UL
RBC #/AREA URNS HPF: (no result) /HPF (ref 0–5)
SODIUM SERPL-SCNC: 135 MEQ/L (ref 136–145)
SP GR UR STRIP: 1.02 (ref 1–1.03)
TRICYCLICS UR QL: NEGATIVE
URINALYSIS COMPLETE PNL UR: (no result)
UROBILINOGEN UR STRIP-ACNC: 0.2 E.U./DL (ref 0.2–1)
WBC # BLD AUTO: 6.5 TH/CMM (ref 4.8–10.8)
WBC #/AREA URNS HPF: (no result) /HPF (ref 0–5)

## 2019-01-11 PROCEDURE — Z7610: HCPCS

## 2019-01-11 RX ADMIN — INSULIN ASPART SCH UNITS: 100 INJECTION, SOLUTION INTRAVENOUS; SUBCUTANEOUS at 21:10

## 2019-01-11 RX ADMIN — HYDROCODONE BITARTRATE AND ACETAMINOPHEN PRN TAB: 5; 325 TABLET ORAL at 20:51

## 2019-01-11 NOTE — HISTORY & PHYSICAL
ADMIT DATE:  01/11/2019



HISTORY OF PRESENT ILLNESS:  The patient is very well patient to me, from

[place].  The patient came through the Emergency Room at the [facility], was admitted for various reasons including a possible pneumonia and

increasing cough, increasing shortness of breath, and possible rule out MI and

left-sided weakness, history of past CVA with hemiparesis and the patient has

very uncontrolled diabetes.  The patient has been coughing for several days and

complains of left-sided weakness somewhat increased complaints of cough with

expectoration and his saturation was low.



PAST MEDICAL HISTORY:  History of again hypertension, peripheral vascular

disease, history of stroke, history of hip fracture, history of obesity, history

of neuropathy and history of coronary artery disease.



PHYSICAL EXAMINATION:

GENERAL:  Alert, oriented, elderly male patient.

HEAD:  Normal.

ENT:  Normal.  Left-sided weakness.

LUNGS:  Bilateral rales, decreased breath sounds.

CARDIOVASCULAR SYSTEM:  S1, S2 heard.

ABDOMEN:  Soft.  Bowel sounds are heard.



LABORATORY DATA:  The patient's EKG showed depression in II, III, aVF.  Chest

x-ray, right upper lobe infiltrate and right lower lobe infiltrate.



DIAGNOSES:  Pneumonia, acute respiratory failure, increasing cough, increasing

shortness of breath, increasing left-sided weakness, rule out recurrent

cerebrovascular accident, diabetes very uncontrolled, and polyneuropathy was

made.



PLAN:  The patient is being admitted.  I will follow the patient.  We will call

the Neurology and Pulmonary and ID consult and I will follow the patient.





DD: 01/11/2019 19:11

DT: 01/11/2019 22:03

JOB# [id number]  [id number]

## 2019-01-11 NOTE — DIAGNOSTIC IMAGING REPORT
Chest x-ray (single view, AP)



HISTORY: Shortness of breath



There is a poor inspiration along with elevation the right

hemidiaphragm.  No acute focal pulmonary processes.  Heart size may be

generous.



IMPRESSION:

1.  No acute abnormalities or significant change compared to prior study

of August 21, 2018.

## 2019-01-11 NOTE — ED PHYSICIAN CHART
ED Chief Complaint/HPI





- Patient Information


Date Seen:: 01/11/19


Time Seen:: 12:04


Chief Complaint:: cough


History of Present Illness:: 





cough without sputum production.


Allergies:: 


 Allergies











Allergy/AdvReac Type Severity Reaction Status Date / Time


 


No Known Allergies Allergy   Verified 08/20/18 23:10











Historian:: Patient


Review:: Nurse's Note Reviewed, Transfer documents Reviewed





ED Review of Systems





- Review of Systems


General/Constitutional: No fever, No chills, No weight loss, No weakness, No 

diaphoresis, No edema, No loss of appetite


Skin: No skin lesions, No rash, No bruising


Head: No headache, No light-headedness


Eyes: No loss of vision, No pain, No diplopia


ENT: No earache, No nasal drainage, No sore throat, No tinnitus


Neck: No neck pain, No swelling, No thyromegaly, No stiffness, No mass noted


Cardio Vascular: No chest pain, No palpitations, No PND, No orthopnea, No edema


Pulmonary: No SOB, Cough, No sputum, No wheezing


GI: No nausea, No vomiting, No diarrhea, No pain, No melena, No hematochezia, 

No constipation, No hematemesis


G/U: No dysuria, No frequency, No hematuria


Musculoskeletal: No bone or joint pain, No back pain, No muscle pain


Endocrine: No polyuria, No polydipsia


Psychiatric: No prior psych history, No depression, No anxiety, No suicidal 

ideation


Hematopoietic: No bruising, No lymphadenopathy


Allergic/Immuno: No urticaria, No angioedema


Neurological: No syncope, No focal symptoms, No weakness, No paresthesia, No 

headache, No seizure, No dizziness, No confusion, No vertigo





ED Past Medical History





- Past Medical History


Obtainable: No


Past Medical History: HTN, DM, Asthma/COPD, Dyslipidemia, PUD/GERD, Other (

obesity; ASVD; BPH; obstructive sleep apnea)





Family Medical History





- Family Member


  ** Mother


History Unknown: Yes


Ethnicity: Non-


Hx Family Coronary Artery Disease: Yes


Hx Family Diabetes: Yes





ED Physical Exam





- Physical Examination


General/Constitutional: Awake, Well-developed, well-nourished, Alert, No 

distress, GCS 15, Non-toxic appearing, Ambulatory


Other Gen/Cons comments:: 





Left sided hemiparesis


Head: Atraumatic


Eyes: Lids, conjuctiva normal, PERRL, EOMI


Skin: Nl inspection, No rash, No skin lesions, No ecchymosis, Well hydrated, No 

lymphadenopathy


ENMT: External ears, nose nl


Neck: Nontender, No nuchal rigidity, No stridor


Respiratory: Nl effort/Exclusion, Clear to Auscultation, No Wheeze/Rhonchi/Rales


Cardio Vascular: RRR, No murmur, gallop, rubs, NL S1 S2


GI: No tenderness/rebounding/guarding, No organomegaly, No hernia, Normal BS's, 

Nondistended, No mass/bruits, No McBurney tenderness


: No CVA tenderness


Extremities: No tenderness or effusion, Normal digits & nails


Other Extremities comments:: 


Left sided hemiparesis


Neuro/Psych: Alert/oriented, Judgement/insight normal, Mood normal


Other Neuro/Psych comments:: 





Left sided hemiparesis





ED Assessment





- Assessment


General Assessment: 


EKG FROM 12:06:18 p.m. reveals normal sinus rhythm with flipped t wave in III 

and q waves in III and AVF.





CXR per my reading reveals an elevated right hemidiaphragm with granulomas on 

the right side versus patchy infiltrate.





**





presented case to Dr. Varela who will admit the patient.





ED Septic Shock





- .


Is Septic Shock (SBP<90, OR Lactate>4 mmol\L) present?: No





ED Reassessment (Disposition)





- Reassessment


Reassessment Condition:: Unchanged





- Diagnosis


Diagnosis:: 


Cough





Anemia





Thrombocytosis





Glucosuria





Positive opiates on drug screen





obesity





ASVD





BPH





obstructive sleep apnea





- Patient Disposition


Discharge/Transfer:: Acute Care w/in this hosp


Condition at Disposition:: Stable, Unchanged

## 2019-01-12 RX ADMIN — HUMAN INSULIN SCH: 100 INJECTION, SOLUTION SUBCUTANEOUS at 07:42

## 2019-01-12 RX ADMIN — MORPHINE SULFATE PRN MG: 4 INJECTION, SOLUTION INTRAMUSCULAR; INTRAVENOUS at 10:09

## 2019-01-12 RX ADMIN — INSULIN ASPART SCH UNITS: 100 INJECTION, SOLUTION INTRAVENOUS; SUBCUTANEOUS at 06:46

## 2019-01-12 RX ADMIN — HUMAN INSULIN SCH: 100 INJECTION, SOLUTION SUBCUTANEOUS at 07:36

## 2019-01-12 RX ADMIN — INSULIN ASPART SCH UNITS: 100 INJECTION, SOLUTION INTRAVENOUS; SUBCUTANEOUS at 17:02

## 2019-01-12 RX ADMIN — Medication SCH EACH: at 09:05

## 2019-01-12 RX ADMIN — INSULIN ASPART SCH UNITS: 100 INJECTION, SOLUTION INTRAVENOUS; SUBCUTANEOUS at 12:11

## 2019-01-12 RX ADMIN — NIFEDIPINE SCH MG: 30 TABLET, FILM COATED, EXTENDED RELEASE ORAL at 09:05

## 2019-01-12 RX ADMIN — TERBINAFINE HYDROCHLORIDE SCH APPL: 1 CREAM TOPICAL at 09:06

## 2019-01-12 RX ADMIN — HYDROCODONE BITARTRATE AND ACETAMINOPHEN PRN TAB: 5; 325 TABLET ORAL at 22:34

## 2019-01-12 RX ADMIN — ALBUTEROL SULFATE PRN MG: 2.5 SOLUTION RESPIRATORY (INHALATION) at 23:05

## 2019-01-12 RX ADMIN — PANTOPRAZOLE SODIUM SCH MG: 40 TABLET, DELAYED RELEASE ORAL at 09:03

## 2019-01-12 RX ADMIN — MORPHINE SULFATE PRN MG: 4 INJECTION, SOLUTION INTRAMUSCULAR; INTRAVENOUS at 20:39

## 2019-01-12 RX ADMIN — HUMAN INSULIN SCH: 100 INJECTION, SOLUTION SUBCUTANEOUS at 12:57

## 2019-01-12 RX ADMIN — TERBINAFINE HYDROCHLORIDE SCH APPL: 1 CREAM TOPICAL at 17:08

## 2019-01-12 RX ADMIN — POTASSIUM CHLORIDE SCH MEQ: 20 TABLET, EXTENDED RELEASE ORAL at 09:04

## 2019-01-12 RX ADMIN — HYDROCODONE BITARTRATE AND ACETAMINOPHEN PRN TAB: 5; 325 TABLET ORAL at 04:30

## 2019-01-12 RX ADMIN — INSULIN ASPART SCH UNITS: 100 INJECTION, SOLUTION INTRAVENOUS; SUBCUTANEOUS at 20:39

## 2019-01-12 RX ADMIN — ASPIRIN 81 MG SCH MG: 81 TABLET ORAL at 09:04

## 2019-01-12 RX ADMIN — INSULIN DETEMIR SCH UNITS: 100 INJECTION, SOLUTION SUBCUTANEOUS at 09:12

## 2019-01-12 NOTE — GENERAL PROGRESS NOTE
Objective





- Results


Result Diagrams: 


 19 12:15





 19 12:15


Recent Labs: 


 Laboratory Last Values











WBC  6.5 Th/cmm (4.8-10.8)   19  12:15    


 


RBC  4.41 Mil/cmm (4.30-5.70)   19  12:15    


 


Hgb  10.1 gm/dL (12-16)  L  19  12:15    


 


Hct  31.9 % (41.0-60)  L  19  12:15    


 


MCV  72.4 fl (80-99)  L  19  12:15    


 


MCH  23.0 pg (26.0-30.0)  L  19  12:15    


 


MCHC Differential  31.8 pg (28.0-36.0)   19  12:15    


 


RDW  14.6 % (11.5-20.0)   19  12:15    


 


Plt Count  450 Th/cmm (150-400)  H  19  12:15    


 


MPV  7.0 fl  19  12:15    


 


Neutrophils %  60.0 % (40.0-80.0)   19  12:15    


 


Lymphocytes %  24.5 % (20.0-50.0)   19  12:15    


 


Monocytes %  13.2 % (2.0-10.0)  H  19  12:15    


 


Eosinophils %  2.0 % (0.0-5.0)   19  12:15    


 


Basophils %  0.3 % (0.0-2.0)   19  12:15    


 


Sodium  135 mEq/L (136-145)  L  19  12:15    


 


Potassium  4.2 mEq/L (3.5-5.1)   19  12:15    


 


Chloride  101 mEq/L ()   19  12:15    


 


Carbon Dioxide  24.7 mEq/L (21.0-31.0)   19  12:15    


 


Anion Gap  13.5  (7.0-16.0)   19  12:15    


 


BUN  15 mg/dL (7-25)   19  12:15    


 


Creatinine  0.8 mg/dL (0.7-1.3)   19  12:15    


 


Est GFR ( Amer)  > 60.0 ml/min (>90)   19  12:15    


 


Est GFR (Non-Af Amer)  > 60.0 ml/min  19  12:15    


 


BUN/Creatinine Ratio  18.8   19  12:15    


 


Glucose  314 mg/dL ()  H  19  12:15    


 


POC Glucose  326 MG/DL (70 - 105)  H  19  12:06    


 


Calcium  9.1 mg/dL (8.6-10.3)   19  12:15    


 


Phosphorus  3.3 mg/dL (2.5-5.0)   19  12:15    


 


Magnesium  1.9 mg/dL (1.9-2.7)   19  12:15    


 


Total Bilirubin  0.2 mg/dL (0.3-1.0)  L  19  12:15    


 


AST  31 U/L (13-39)   19  12:15    


 


ALT  30 U/L (7-52)   19  12:15    


 


Alkaline Phosphatase  78 U/L ()   19  12:15    


 


Troponin I  0.01 ng/mL (0.01-0.05)   19  12:15    


 


B-Natriuretic Peptide  5.8 pg/mL (5.0-100.0)   19  12:15    


 


Total Protein  7.2 gm/dL (6.0-8.3)   19  12:15    


 


Albumin  4.1 gm/dL (4.2-5.5)  L  19  12:15    


 


Globulin  3.1 gm/dL  19  12:15    


 


Albumin/Globulin Ratio  1.3  (1.0-1.8)   19  12:15    


 


Urine Source  CLEAN C   19  14:15    


 


Urine Color  YELLOW   19  14:15    


 


Urine Clarity  CLEAR  (CLEAR)   19  14:15    


 


Urine pH  6.0  (4.6 - 8.0)   19  14:15    


 


Ur Specific Gravity  1.020  (1.005-1.030)   19  14:15    


 


Urine Protein  NEGATIVE mg/dL (NEGATIVE)   19  14:15    


 


Urine Glucose (UA)  >=1000 mg/dL (NEGATIVE)  H  19  14:15    


 


Urine Ketones  NEGATIVE mg/dL (NEGATIVE)   19  14:15    


 


Urine Blood  NEGATIVE  (NEGATIVE)   19  14:15    


 


Urine Nitrate  NEGATIVE  (NEGATIVE)   19  14:15    


 


Urine Bilirubin  NEGATIVE  (NEGATIVE)   19  14:15    


 


Urine Urobilinogen  0.2 E.U./dL (0.2 - 1.0)   19  14:15    


 


Ur Leukocyte Esterase  NEGATIVE  (NEGATIVE)   19  14:15    


 


Urine RBC  0-2 /hpf (0-5)  H  19  14:15    


 


Urine WBC  2-5 /hpf (0-5)   19  14:15    


 


Ur Epithelial Cells  FEW /lpf (FEW)   19  14:15    


 


Urine Bacteria  FEW /hpf (NONE SEEN)   19  14:15    


 


Urine Opiates Screen  POSITIVE  (NEGATIVE)  H  19  14:15    


 


Urine Methadone Screen  NEGATIVE  (NEGATIVE)   19  14:15    


 


Ur Barbiturates Screen  NEGATIVE  (NEGATIVE)   19  14:15    


 


Ur Tricyclics Screen  NEGATIVE  (NEGATIVE)   19  14:15    


 


Ur Phencyclidine Scrn  NEGATIVE  (NEGATIVE)   19  14:15    


 


Amphetamines Screen  NEGATIVE  (NEGATIVE)   19  14:15    


 


U Methamphetamines Scrn  NEGATIVE  (NEGATIVE)   19  14:15    


 


U Benzodiazepines Scrn  NEGATIVE  (NEGATIVE)   19  14:15    


 


U Cocaine Metab Screen  NEGATIVE  (NEGATIVE)   19  14:15    


 


U Cannabinoids Screen  NEGATIVE  (NEGATIVE)   19  14:15    














- Physical Exam


Vitals and I&O: 


 Vital Signs











Temp  97.4 F   19 12:16


 


Pulse  81   19 13:10


 


Resp  17   19 12:16


 


BP  136/68   19 13:10


 


Pulse Ox  97   19 12:16








 Intake & Output











 19





 18:59 06:59 18:59


 


Intake Total  300 999.167


 


Output Total  650 


 


Balance  -350 999.167


 


Weight (lbs) 99.79 kg 99.79 kg 


 


Intake:   


 


  Intake, IV Amount   999.167


 


    Sodium Chloride 0.9% 1,   949.167





    000 ml @ 50 mls/hr IV .   





    Q20H [facility] Rx#:[id number]   


 


    cefTRIAXone 1 gm In   50





    Sodium Chloride 0.9% 50   





    ml @ 100 mls/hr IV Q24HR   





    [facility] Rx#:[id number]   


 


  Oral  300 


 


Output:   


 


  Urine  650 


 


Other:   


 


  Weight Source Estimated Bedscale 











Active Medications: 


Current Medications





Acetaminophen (Tylenol)  650 mg PO Q4HR PRN


   PRN Reason: PAIN OR FEVER >100.4


   Stop: 19 19:09


Acetaminophen/Hydrocodone Bitart (Norco 5mg/325mg)  1 tab PO Q6H PRN


   PRN Reason: mild pain


   Stop: 19 19:09


   Last Admin: 19 04:30 Dose:  1 tab


Albuterol Sulfate (Albuterol 2.5mg/3ml Neb Ud)  2.5 mg HHN Q4HRT PRN


   PRN Reason: Shortness of Breath


   Stop: 19 19:09


Aspirin (Aspirin Chewable)  81 mg PO DAILY [facility]


   Stop: 19 08:59


   Last Admin: 19 09:04 Dose:  81 mg


Benzocaine/Menthol (Cepacol)  1 josiah MM Q4HR PRN


   PRN Reason: Sore Throat


   Stop: 19 19:09


Bisacodyl (Dulcolax 10 Mg Supp)  10 mg RC DAILY PRN


   PRN Reason: Constipation


   Stop: 19 19:09


Camphor/Menthol (Bengay Greaseless 10%-15%)  1 appl TP Q4HR PRN


   PRN Reason: Pain (Mild)


   Stop: 19 19:09


Clopidogrel Bisulfate (Plavix)  75 mg PO DAILY [facility]


   Stop: 19 08:59


   Last Admin: 19 09:03 Dose:  75 mg


Docusate Sodium (Colace)  100 mg PO DAILY PRN


   PRN Reason: Constipation


   Stop: 19 19:09


Famotidine (Pepcid)  20 mg PO BID [facility]


   Stop: 19 08:59


   Last Admin: 19 09:03 Dose:  20 mg


Furosemide (Lasix)  20 mg PO DAILY [facility]


   Stop: 19 08:59


   Last Admin: 19 09:03 Dose:  20 mg


Gabapentin (Neurontin)  400 mg PO TID [facility]


   Stop: 19 20:59


   Last Admin: 19 13:09 Dose:  400 mg


Gabapentin (Neurontin)  600 mg PO BID [facility]


   Stop: 19 08:59


   Last Admin: 19 09:04 Dose:  600 mg


Gemfibrozil (Lopid)  600 mg PO BIDAC [facility]


   Stop: 19 07:29


   Last Admin: 19 06:46 Dose:  600 mg


Glucagon (Glucagen)  1 mg IM PRN PRN


   PRN Reason: IF BS <70


   Stop: 19 19:09


Guaifenesin (Robitussin)  200 mg PO Q4HR PRN


   PRN Reason: Cough or Congestion


   Stop: 19 17:44


Hydralazine HCl (Apresoline)  50 mg PO TID [facility]


   Stop: 19 20:59


   Last Admin: 19 13:10 Dose:  50 mg


Sodium Chloride (Nacl 0.9%)  1,000 mls @ 50 mls/hr IV .Q20H [facility]


   Stop: 19 17:44


   Last Admin: 19 13:03 Dose:  50 mls/hr


Ceftriaxone Sodium 1 gm/ (Sodium Chloride)  50 mls @ 100 mls/hr IV Q24HR [facility]


   Stop: 19 08:59


   Last Infusion: 19 10:44 Dose:  Infused


Insulin Aspart (Novolog Insulin Sliding Scale)  0 units SUBQ ACHS [facility]; Protocol


   Stop: 19 20:59


   Last Admin: 19 12:11 Dose:  8 units


Insulin Detemir (Levemir Insulin)  20 units SUBQ QAM [facility]; Protocol


   Stop: 19 08:59


   Last Admin: 19 09:12 Dose:  20 units


Lactobacillus Rhamnosus (Culturelle 15b)  1 each PO DAILY [facility]


   Stop: 19 08:59


   Last Admin: 19 09:05 Dose:  1 each


Lactulose (Cephulac)  20 gm PO TID PRN


   PRN Reason: GI DISTRESS


   Stop: 19 07:40


Losartan Potassium (Cozaar)  100 mg PO DAILY [facility]


   Stop: 19 08:59


   Last Admin: 19 09:04 Dose:  100 mg


Magnesium Hydroxide (Milk Of Magnesia)  30 ml PO DAILY PRN


   PRN Reason: Constipation


   Stop: 19 19:09


Metformin HCl (Glucophage)  850 mg PO BID [facility]


   Stop: 19 08:59


   Last Admin: 19 09:05 Dose:  850 mg


Methocarbamol (Robaxin)  500 mg PO Q6H [facility]


   Stop: 19 19:14


   Last Admin: 19 13:12 Dose:  500 mg


Morphine Sulfate (Morphine)  2 mg IVP Q4HR PRN


   PRN Reason: Pain (Moderate)


   Stop: 19 17:47


   Last Admin: 19 10:09 Dose:  2 mg


Multivitamins/Vitamin C (Theragran)  1 tab PO DAILY [facility]


   Stop: 19 08:59


   Last Admin: 19 09:06 Dose:  1 tab


Nifedipine (Procardia Xl)  60 mg PO DAILY [facility]


   Stop: 19 08:59


   Last Admin: 19 09:05 Dose:  60 mg


Pantoprazole Sodium (Protonix)  40 mg PO DAILY [facility]


   Stop: 19 08:59


   Last Admin: 19 09:03 Dose:  40 mg


Potassium Chloride (Klor-Con)  20 meq PO DAILY [facility]


   Stop: 19 08:59


   Last Admin: 19 09:04 Dose:  20 meq


Rivaroxaban (Xarelto)  10 mg PO DAILY [facility]


   Stop: 19 08:59


   Last Admin: 19 09:06 Dose:  10 mg


Sodium Phosphate (Fleet Enema)  135 ml RC DAILY PRN


   PRN Reason: IF MOM OR DULCOLAX INEFFECTIVE


   Stop: 19 19:09


Terbinafine HCl (Lamisil 1% Cream)  1 appl TP BID [facility]


   Stop: 19 08:59


   Last Admin: 19 09:06 Dose:  1 appl


Zolpidem Tartrate (Ambien)  10 mg PO HS PRN


   PRN Reason: Insomnia


   Stop: 19 07:40











- Procedures


Procedures: 


 Procedures











Procedure Code Date


 


EXCISION OF DUODENUM, ENDO, DIAGN 1DV72DX 17


 


EXCISION OF STOMACH, ENDO, DIAGN 0SV27WB 17


 


REPOSITION LEFT UPPER FEMUR WITH INT FIX, OPEN APPROACH [id number] 18


 


TREAT THIGH FRACTURE 57266 18














Nutritional Asmnt/Malnutr-PDOC





- Dietary Evaluation


Malnutrition Findings (Please click <Entered> for more info): 








Nutritional Asmnt/Malnutrition                             Start:  19 10:

11


Text:                                                      Status: Active      

  


Freq:                                                                          

  


Protocol:                                                                      

  


 Document     19 10:11  MMULLOVE  (Rec: 19 10:29  MMULLOVE HILTON-

FNS1)


 Nutritional Asmnt/Malnutrition


     Patient General Information


      Nutritional Screening                      High Risk


                                                 Consult


      Diagnosis                                  RLL Pneumonia, anemia,


                                                 Dehydration, Uncontrolled DM


      Pertinent Medical Hx/Surgical Hx           HTN, peripheral vascular


                                                 disease, hx of stroke, hx of


                                                 hip fracture, obesity,


                                                 neuropathy, CAD


      Subjective Information                     Consult received for Blood


                                                 glucose 314 on admission.


      Current Diet Order/ Nutrition Support      45 gm CCHO, ANNA MARIE


      Patient / S.O                              Not Indicated


      Pertinent Medications                      dulcolax, colace, pepcid,


                                                 lasix, glucagon, novolog,


                                                 levemir, novolin R, culturelle


                                                 , lactulose, cozaar, MOM,


                                                 Metformin, Theragran, protonix


                                                 , Klor, Fleet enema


      Pertinent Labs                             () Na 135, Glucose 271-314


     Nutritional Hx/Data


      Height                                     1.78 m


      Height (Calculated Centimeters)            177.8


      Current Weight (lbs)                       99.79 kg


      Weight (Calculated Kilograms)              99.8


      Weight (Calculated Grams)                  67246.3


      Ideal Body Weight                          166


      % Ideal Body Weight                        132


      Body Mass Index (BMI)                      31.5


      Recent Weight Change                       No


      Weight Status                              Obese


     GI Symptoms


      GI Symptoms                                None


      Last BM                                    none noted since admission.


      Difficult in:                              None


      Food Allergies                             No


      Cultural/Ethnic/Confucianist Belief           None indicated


      Usual diet at home                         unknown


      Skin Integrity/Comment:                    Timothy 19, Intact


     Estimated Nutritional Goals


      BEE in Kcals:                              Using Current wt


                                                 Adj wt of IBW


      Calories/Kcals/Kg                          25-30 kcal/kg using 81.5kg Adj


                                                 wt


      Kcals Calculated                           ~7116-1411 kcal/day


      Protein:                                   Using Current wt


                                                 Adj wt of IBW


      Protein g/k.8-1 gm/kg


      Protein Calculated                         ~65-80 gm/day


      Fluid: ml                                  ~5546-6470 ml/day (1 ml/kcal)


     Nutritional Problem


      1. Problem


       Problem                                   Altered nutrition related lab


                                                 values


       Etiology                                  uncontrolled hyperglycemia aeb


       Signs/Symptoms:                           Glucose 271-314


     Intervention/Recommendation


      Comments                                   1. Continue 45 gm CCHO, ANNA MARIE


                                                 diet as tolerated by patient.


                                                 2. MD to continue to modify


                                                 insulin regimen as needed for


                                                 optimal glycemic control.


     Expected Outcomes/Goals


      Expected Outcomes/Goals                    Oral intake >75% of meals,


                                                 weight stable or trend toward


                                                 ideal body weight nutrition


                                                 related labs/glucose


                                                 normalizes

## 2019-01-13 LAB
ANION GAP SERPL CALC-SCNC: 13.2 MMOL/L (ref 7–16)
BASOPHILS # BLD AUTO: 0 TH/CUMM (ref 0–0.2)
BASOPHILS NFR BLD AUTO: 0.3 % (ref 0–2)
BUN SERPL-MCNC: 11 MG/DL (ref 7–25)
CALCIUM SERPL-MCNC: 9.1 MG/DL (ref 8.6–10.3)
CHLORIDE SERPL-SCNC: 100 MEQ/L (ref 98–107)
CO2 SERPL-SCNC: 25.7 MEQ/L (ref 21–31)
CREAT SERPL-MCNC: 0.6 MG/DL (ref 0.7–1.3)
EOSINOPHIL # BLD AUTO: 0.1 TH/CMM (ref 0.1–0.4)
EOSINOPHIL NFR BLD AUTO: 2 % (ref 0–5)
ERYTHROCYTE [DISTWIDTH] IN BLOOD BY AUTOMATED COUNT: 14.8 % (ref 11.5–20)
GLUCOSE SERPL-MCNC: 304 MG/DL (ref 70–105)
HCT VFR BLD CALC: 33 % (ref 41–60)
HGB BLD-MCNC: 10.6 GM/DL (ref 12–16)
LYMPHOCYTE AB SER FC-ACNC: 1.7 TH/CMM (ref 1.5–3)
LYMPHOCYTES NFR BLD AUTO: 23.3 % (ref 20–50)
MCH RBC QN AUTO: 23.1 PG (ref 26–30)
MCHC RBC AUTO-ENTMCNC: 32.2 PG (ref 28–36)
MCV RBC AUTO: 71.8 FL (ref 80–99)
MONOCYTES # BLD AUTO: 1.1 TH/CMM (ref 0.3–1)
MONOCYTES NFR BLD AUTO: 14.8 % (ref 2–10)
NEUTROPHILS # BLD: 4.2 TH/CMM (ref 1.8–8)
NEUTROPHILS NFR BLD AUTO: 59.6 % (ref 40–80)
PLATELET # BLD: 415 TH/CMM (ref 150–400)
PMV BLD AUTO: 6.8 FL
POTASSIUM SERPL-SCNC: 3.9 MEQ/L (ref 3.5–5.1)
RBC # BLD AUTO: 4.6 MIL/CMM (ref 4.3–5.7)
SODIUM SERPL-SCNC: 135 MEQ/L (ref 136–145)
WBC # BLD AUTO: 7.1 TH/CMM (ref 4.8–10.8)

## 2019-01-13 RX ADMIN — NIFEDIPINE SCH MG: 30 TABLET, FILM COATED, EXTENDED RELEASE ORAL at 08:30

## 2019-01-13 RX ADMIN — INSULIN ASPART SCH UNITS: 100 INJECTION, SOLUTION INTRAVENOUS; SUBCUTANEOUS at 20:21

## 2019-01-13 RX ADMIN — INSULIN ASPART SCH UNITS: 100 INJECTION, SOLUTION INTRAVENOUS; SUBCUTANEOUS at 06:38

## 2019-01-13 RX ADMIN — TERBINAFINE HYDROCHLORIDE SCH APPL: 1 CREAM TOPICAL at 09:00

## 2019-01-13 RX ADMIN — MORPHINE SULFATE PRN MG: 4 INJECTION, SOLUTION INTRAMUSCULAR; INTRAVENOUS at 18:17

## 2019-01-13 RX ADMIN — PANTOPRAZOLE SODIUM SCH MG: 40 TABLET, DELAYED RELEASE ORAL at 08:29

## 2019-01-13 RX ADMIN — HYDROCODONE BITARTRATE AND ACETAMINOPHEN PRN TAB: 5; 325 TABLET ORAL at 12:40

## 2019-01-13 RX ADMIN — TERBINAFINE HYDROCHLORIDE SCH APPL: 1 CREAM TOPICAL at 17:11

## 2019-01-13 RX ADMIN — Medication SCH EACH: at 08:30

## 2019-01-13 RX ADMIN — INSULIN ASPART SCH UNITS: 100 INJECTION, SOLUTION INTRAVENOUS; SUBCUTANEOUS at 17:28

## 2019-01-13 RX ADMIN — POTASSIUM CHLORIDE SCH MEQ: 20 TABLET, EXTENDED RELEASE ORAL at 08:29

## 2019-01-13 RX ADMIN — ASPIRIN 81 MG SCH MG: 81 TABLET ORAL at 08:29

## 2019-01-13 RX ADMIN — ALBUTEROL SULFATE PRN MG: 2.5 SOLUTION RESPIRATORY (INHALATION) at 18:56

## 2019-01-13 RX ADMIN — HYDROCODONE BITARTRATE AND ACETAMINOPHEN PRN TAB: 5; 325 TABLET ORAL at 20:22

## 2019-01-13 RX ADMIN — INSULIN DETEMIR SCH UNITS: 100 INJECTION, SOLUTION SUBCUTANEOUS at 09:00

## 2019-01-13 RX ADMIN — INSULIN ASPART SCH UNITS: 100 INJECTION, SOLUTION INTRAVENOUS; SUBCUTANEOUS at 12:03

## 2019-01-13 NOTE — GENERAL PROGRESS NOTE
Objective





- Results


Result Diagrams: 


 19 09:19





 19 09:19


Recent Labs: 


 Laboratory Last Values











WBC  7.1 Th/cmm (4.8-10.8)   19  09:19    


 


RBC  4.60 Mil/cmm (4.30-5.70)   19  09:19    


 


Hgb  10.6 gm/dL (12-16)  L  19  09:19    


 


Hct  33.0 % (41.0-60)  L  19  09:19    


 


MCV  71.8 fl (80-99)  L  19  09:19    


 


MCH  23.1 pg (26.0-30.0)  L  19  09:    


 


MCHC Differential  32.2 pg (28.0-36.0)   19  09:    


 


RDW  14.8 % (11.5-20.0)   19  09:19    


 


Plt Count  415 Th/cmm (150-400)  H  19  09:19    


 


MPV  6.8 fl  19  09:19    


 


Neutrophils %  59.6 % (40.0-80.0)   19  09:19    


 


Lymphocytes %  23.3 % (20.0-50.0)   19  09:19    


 


Monocytes %  14.8 % (2.0-10.0)  H  19  09:19    


 


Eosinophils %  2.0 % (0.0-5.0)   19  09:19    


 


Basophils %  0.3 % (0.0-2.0)   19  09:19    


 


Sodium  135 mEq/L (136-145)  L  19  09:19    


 


Potassium  3.9 mEq/L (3.5-5.1)   19  09:19    


 


Chloride  100 mEq/L ()   19  09:19    


 


Carbon Dioxide  25.7 mEq/L (21.0-31.0)   19  09:19    


 


Anion Gap  13.2  (7.0-16.0)   19  09:19    


 


BUN  11 mg/dL (7-25)   19  09:19    


 


Creatinine  0.6 mg/dL (0.7-1.3)  L  01/13/19  09:19    


 


Est GFR ( Amer)  > 60.0 ml/min (>90)   19  09:    


 


Est GFR (Non-Af Amer)  > 60.0 ml/min  19  09:19    


 


BUN/Creatinine Ratio  18.3   19  09:19    


 


Glucose  304 mg/dL ()  H  19  09:19    


 


POC Glucose  357 MG/DL (70 - 105)  H  19  16:06    


 


Calcium  9.1 mg/dL (8.6-10.3)   19  09:19    


 


Phosphorus  3.3 mg/dL (2.5-5.0)   19  12:15    


 


Magnesium  1.9 mg/dL (1.9-2.7)   19  12:15    


 


Total Bilirubin  0.2 mg/dL (0.3-1.0)  L  19  12:15    


 


AST  31 U/L (13-39)   19  12:15    


 


ALT  30 U/L (7-52)   19  12:15    


 


Alkaline Phosphatase  78 U/L ()   19  12:15    


 


Troponin I  0.01 ng/mL (0.01-0.05)   19  12:15    


 


B-Natriuretic Peptide  5.8 pg/mL (5.0-100.0)   19  12:15    


 


Total Protein  7.2 gm/dL (6.0-8.3)   19  12:15    


 


Albumin  4.1 gm/dL (4.2-5.5)  L  19  12:15    


 


Globulin  3.1 gm/dL  19  12:15    


 


Albumin/Globulin Ratio  1.3  (1.0-1.8)   19  12:15    


 


Urine Source  CLEAN C   19  14:15    


 


Urine Color  YELLOW   19  14:15    


 


Urine Clarity  CLEAR  (CLEAR)   19  14:15    


 


Urine pH  6.0  (4.6 - 8.0)   19  14:15    


 


Ur Specific Gravity  1.020  (1.005-1.030)   19  14:15    


 


Urine Protein  NEGATIVE mg/dL (NEGATIVE)   19  14:15    


 


Urine Glucose (UA)  >=1000 mg/dL (NEGATIVE)  H  19  14:15    


 


Urine Ketones  NEGATIVE mg/dL (NEGATIVE)   19  14:15    


 


Urine Blood  NEGATIVE  (NEGATIVE)   19  14:15    


 


Urine Nitrate  NEGATIVE  (NEGATIVE)   19  14:15    


 


Urine Bilirubin  NEGATIVE  (NEGATIVE)   19  14:15    


 


Urine Urobilinogen  0.2 E.U./dL (0.2 - 1.0)   19  14:15    


 


Ur Leukocyte Esterase  NEGATIVE  (NEGATIVE)   19  14:15    


 


Urine RBC  0-2 /hpf (0-5)  H  19  14:15    


 


Urine WBC  2-5 /hpf (0-5)   19  14:15    


 


Ur Epithelial Cells  FEW /lpf (FEW)   19  14:15    


 


Urine Bacteria  FEW /hpf (NONE SEEN)   19  14:15    


 


Urine Opiates Screen  POSITIVE  (NEGATIVE)  H  19  14:15    


 


Urine Methadone Screen  NEGATIVE  (NEGATIVE)   19  14:15    


 


Ur Barbiturates Screen  NEGATIVE  (NEGATIVE)   19  14:15    


 


Ur Tricyclics Screen  NEGATIVE  (NEGATIVE)   19  14:15    


 


Ur Phencyclidine Scrn  NEGATIVE  (NEGATIVE)   19  14:15    


 


Amphetamines Screen  NEGATIVE  (NEGATIVE)   19  14:15    


 


U Methamphetamines Scrn  NEGATIVE  (NEGATIVE)   19  14:15    


 


U Benzodiazepines Scrn  NEGATIVE  (NEGATIVE)   19  14:15    


 


U Cocaine Metab Screen  NEGATIVE  (NEGATIVE)   19  14:15    


 


U Cannabinoids Screen  NEGATIVE  (NEGATIVE)   19  14:15    














- Physical Exam


Vitals and I&O: 


 Vital Signs











Temp  97.8 F   19 16:05


 


Pulse  101   19 18:57


 


Resp  18   19 18:57


 


BP  135/72   19 16:05


 


Pulse Ox  94   19 18:57








 Intake & Output











 19





 06:59 18:59 06:59


 


Intake Total 200 1000 


 


Output Total 1450  


 


Balance -1250 1000 


 


Weight (lbs) 128.321 kg  


 


Intake:   


 


  Intake, IV Amount  1000 


 


    Sodium Chloride 0.9% 1,  1000 





    000 ml @ 50 mls/hr IV .   





    Q20H [facility] Rx#:[id number]   


 


  Oral 200  


 


Output:   


 


  Urine 1450  


 


Other:   


 


  Weight Source Bedscale  











Active Medications: 


Current Medications





Acetaminophen (Tylenol)  650 mg PO Q4HR PRN


   PRN Reason: PAIN OR FEVER >100.4


   Stop: 19 19:09


Acetaminophen/Hydrocodone Bitart (Norco 5mg/325mg)  1 tab PO Q6H PRN


   PRN Reason: mild pain


   Stop: 19 19:09


   Last Admin: 19 12:40 Dose:  1 tab


Albuterol Sulfate (Albuterol 2.5mg/3ml Neb Ud)  2.5 mg HHN Q4HRT PRN


   PRN Reason: Shortness of Breath


   Stop: 19 19:09


   Last Admin: 19 18:56 Dose:  2.5 mg


Aspirin (Aspirin Chewable)  81 mg PO DAILY [facility]


   Stop: 19 08:59


   Last Admin: 19 08:29 Dose:  81 mg


Benzocaine/Menthol (Cepacol)  1 josiah MM Q4HR PRN


   PRN Reason: Sore Throat


   Stop: 19 19:09


Bisacodyl (Dulcolax 10 Mg Supp)  10 mg RC DAILY PRN


   PRN Reason: Constipation


   Stop: 19 19:09


Camphor/Menthol (Bengay Greaseless 10%-15%)  1 appl TP Q4HR PRN


   PRN Reason: Pain (Mild)


   Stop: 19 19:09


Clopidogrel Bisulfate (Plavix)  75 mg PO DAILY [facility]


   Stop: 19 08:59


   Last Admin: 19 08:29 Dose:  75 mg


Docusate Sodium (Colace)  100 mg PO DAILY PRN


   PRN Reason: Constipation


   Stop: 19 19:09


Famotidine (Pepcid)  20 mg PO BID [facility]


   Stop: 19 08:59


   Last Admin: 19 17:12 Dose:  20 mg


Furosemide (Lasix)  20 mg PO DAILY [facility]


   Stop: 19 08:59


   Last Admin: 19 08:28 Dose:  20 mg


Gabapentin (Neurontin)  400 mg PO TID [facility]


   Stop: 19 20:59


   Last Admin: 19 13:05 Dose:  Not Given


Gabapentin (Neurontin)  600 mg PO BID [facility]


   Stop: 19 08:59


   Last Admin: 19 17:12 Dose:  600 mg


Gemfibrozil (Lopid)  600 mg PO BIDAC [facility]


   Stop: 19 07:29


   Last Admin: 19 17:12 Dose:  600 mg


Glucagon (Glucagen)  1 mg IM PRN PRN


   PRN Reason: IF BS <70


   Stop: 19 19:09


Guaifenesin (Robitussin)  200 mg PO Q4HR PRN


   PRN Reason: Cough or Congestion


   Stop: 19 17:44


Hydralazine HCl (Apresoline)  50 mg PO TID [facility]


   Stop: 19 20:59


   Last Admin: 19 13:14 Dose:  50 mg


Sodium Chloride (Nacl 0.9%)  1,000 mls @ 50 mls/hr IV .Q20H [facility]


   Stop: 19 17:44


   Last Admin: 19 12:01 Dose:  50 mls/hr


Ceftriaxone Sodium 1 gm/ (Sodium Chloride)  50 mls @ 100 mls/hr IV Q24HR [facility]


   Stop: 19 08:59


   Last Admin: 19 11:05 Dose:  100 mls/hr


Insulin Aspart (Novolog Insulin Sliding Scale)  0 units SUBQ ACHS [facility]; Protocol


   Stop: 19 20:59


   Last Admin: 19 17:28 Dose:  10 units


Insulin Detemir (Levemir Insulin)  20 units SUBQ QAM [facility]; Protocol


   Stop: 19 08:59


   Last Admin: 19 09:00 Dose:  20 units


Lactobacillus Rhamnosus (Culturelle 15b)  1 each PO DAILY [facility]


   Stop: 19 08:59


   Last Admin: 19 08:30 Dose:  1 each


Lactulose (Cephulac)  20 gm PO TID PRN


   PRN Reason: GI DISTRESS


   Stop: 19 07:40


Losartan Potassium (Cozaar)  100 mg PO DAILY [facility]


   Stop: 19 08:59


   Last Admin: 19 08:30 Dose:  100 mg


Magnesium Hydroxide (Milk Of Magnesia)  30 ml PO DAILY PRN


   PRN Reason: Constipation


   Stop: 19 19:09


Metformin HCl (Glucophage)  850 mg PO BID [facility]


   Stop: 19 08:59


   Last Admin: 19 17:12 Dose:  850 mg


Methocarbamol (Robaxin)  500 mg PO Q6H [facility]


   Stop: 19 19:14


   Last Admin: 19 18:17 Dose:  500 mg


Morphine Sulfate (Morphine)  2 mg IVP Q4HR PRN


   PRN Reason: Pain (Moderate)


   Stop: 19 17:47


   Last Admin: 19 18:17 Dose:  2 mg


Multivitamins/Vitamin C (Theragran)  1 tab PO DAILY [facility]


   Stop: 19 08:59


   Last Admin: 19 08:29 Dose:  1 tab


Nifedipine (Procardia Xl)  60 mg PO DAILY [facility]


   Stop: 19 08:59


   Last Admin: 19 08:30 Dose:  60 mg


Pantoprazole Sodium (Protonix)  40 mg PO DAILY [facility]


   Stop: 19 08:59


   Last Admin: 19 08:29 Dose:  40 mg


Potassium Chloride (Klor-Con)  20 meq PO DAILY [facility]


   Stop: 19 08:59


   Last Admin: 19 08:29 Dose:  20 meq


Rivaroxaban (Xarelto)  10 mg PO DAILY [facility]


   Stop: 19 08:59


   Last Admin: 19 08:28 Dose:  10 mg


Sodium Phosphate (Fleet Enema)  135 ml RC DAILY PRN


   PRN Reason: IF MOM OR DULCOLAX INEFFECTIVE


   Stop: 19 19:09


Terbinafine HCl (Lamisil 1% Cream)  1 appl TP BID [facility]


   Stop: 19 08:59


   Last Admin: 19 17:11 Dose:  1 appl


Zolpidem Tartrate (Ambien)  10 mg PO HS PRN


   PRN Reason: Insomnia


   Stop: 19 07:40


   Last Admin: 19 22:34 Dose:  10 mg











- Procedures


Procedures: 


 Procedures











Procedure Code Date


 


EXCISION OF DUODENUM, ENDO, DIAGN 8BQ51ZH 17


 


EXCISION OF STOMACH, ENDO, DIAGN 1AM54YC 17


 


REPOSITION LEFT UPPER FEMUR WITH INT FIX, OPEN APPROACH 7VB107S 18


 


TREAT THIGH FRACTURE 84799 18














Nutritional Asmnt/Malnutr-PDOC





- Dietary Evaluation


Malnutrition Findings (Please click <Entered> for more info): 








Nutritional Asmnt/Malnutrition                             Start:  19 10:

11


Text:                                                      Status: Complete    

  


Freq:                                                                          

  


Protocol:                                                                      

  


 Document     19 10:11  MMULLOVE  (Rec: 19 10:29  MMULLOVE [facility]IL[facility]-

[facility])


 Nutritional Asmnt/Malnutrition


     Patient General Information


      Nutritional Screening                      High Risk


                                                 Consult


      Diagnosis                                  RLL Pneumonia, anemia,


                                                 Dehydration, Uncontrolled DM


      Pertinent Medical Hx/Surgical Hx           HTN, peripheral vascular


                                                 disease, hx of stroke, hx of


                                                 hip fracture, obesity,


                                                 neuropathy, CAD.


      Subjective Information                     Consult received for Blood


                                                 glucose 314 on admission.


                                                 Patient asleep at time of


                                                 visit.


      Current Diet Order/ Nutrition Support      45 gm CCHO, ANNA MARIE


      Patient / S.O                              Not Indicated


      Pertinent Medications                      dulcolax, colace, pepcid,


                                                 lasix, glucagon, novolog,


                                                 levemir, novolin R, culturelle


                                                 , lactulose, cozaar, MOM,


                                                 Metformin, Theragran, protonix


                                                 , Klor, Fleet enema


      Pertinent Labs                             () Na 135, Glucose 271-314


     Nutritional Hx/Data


      Height                                     1.78 m


      Height (Calculated Centimeters)            177.8


      Current Weight (lbs)                       99.79 kg


      Weight (Calculated Kilograms)              99.8


      Weight (Calculated Grams)                  69438.3


      Ideal Body Weight                          166


      % Ideal Body Weight                        132


      Body Mass Index (BMI)                      31.5


      Recent Weight Change                       No


      Weight Status                              Obese


     GI Symptoms


      GI Symptoms                                None


      Last BM                                    none noted since admission.


      Difficult in:                              None


      Food Allergies                             No


      Cultural/Ethnic/[employer] Belief           None indicated


      Usual diet at home                         unknown


      Skin Integrity/Comment:                    Timothy 19, Intact


     Estimated Nutritional Goals


      BEE in Kcals:                              Using Current wt


                                                 Adj wt of IBW


      Calories/Kcals/Kg                          25-30 kcal/kg using 81.5kg Adj


                                                 wt


      Kcals Calculated                           ~2305-9796 kcal/day


      Protein:                                   Using Current wt


                                                 Adj wt of IBW


      Protein g/k.8-1 gm/kg


      Protein Calculated                         ~65-80 gm/day


      Fluid: ml                                  ~6695-9809 ml/day (1 ml/kcal)


     Nutritional Problem


      1. Problem


       Problem                                   Altered nutrition related lab


                                                 values


       Etiology                                  uncontrolled hyperglycemia aeb


       Signs/Symptoms:                           Glucose 271-314


     Intervention/Recommendation


      Comments                                   1. Consider modifying diet to


                                                 60gm CCHO due to patient's


                                                 size, Continue ANNA MARIE


                                                 modification as tolerated by


                                                 patient.


                                                 2. MD to continue to modify


                                                 insulin regimen as needed for


                                                 optimal glycemic control.


     Expected Outcomes/Goals


      Expected Outcomes/Goals                    Oral intake >75% of meals,


                                                 weight stable or trend toward


                                                 ideal body weight nutrition


                                                 related labs/glucose


                                                 normalizes


     Physician Parameters for PEM


      Body Mass Index (BMI)                      18 - 24 (Mild)

## 2019-01-14 LAB
ANION GAP SERPL CALC-SCNC: 15.1 MMOL/L (ref 7–16)
BASOPHILS # BLD AUTO: 0.1 TH/CUMM (ref 0–0.2)
BASOPHILS NFR BLD AUTO: 0.8 % (ref 0–2)
BUN SERPL-MCNC: 15 MG/DL (ref 7–25)
CALCIUM SERPL-MCNC: 9.1 MG/DL (ref 8.6–10.3)
CHLORIDE SERPL-SCNC: 100 MEQ/L (ref 98–107)
CO2 SERPL-SCNC: 23.8 MEQ/L (ref 21–31)
CREAT SERPL-MCNC: 0.7 MG/DL (ref 0.7–1.3)
EOSINOPHIL # BLD AUTO: 0.1 TH/CMM (ref 0.1–0.4)
EOSINOPHIL NFR BLD AUTO: 2 % (ref 0–5)
ERYTHROCYTE [DISTWIDTH] IN BLOOD BY AUTOMATED COUNT: 14.8 % (ref 11.5–20)
GLUCOSE SERPL-MCNC: 314 MG/DL (ref 70–105)
HCT VFR BLD CALC: 31.6 % (ref 41–60)
HGB BLD-MCNC: 10.2 GM/DL (ref 12–16)
LYMPHOCYTE AB SER FC-ACNC: 2.1 TH/CMM (ref 1.5–3)
LYMPHOCYTES NFR BLD AUTO: 29.8 % (ref 20–50)
MCH RBC QN AUTO: 23.3 PG (ref 26–30)
MCHC RBC AUTO-ENTMCNC: 32.3 PG (ref 28–36)
MCV RBC AUTO: 72 FL (ref 80–99)
MONOCYTES # BLD AUTO: 0.8 TH/CMM (ref 0.3–1)
MONOCYTES NFR BLD AUTO: 12.1 % (ref 2–10)
NEUTROPHILS # BLD: 3.8 TH/CMM (ref 1.8–8)
NEUTROPHILS NFR BLD AUTO: 55.3 % (ref 40–80)
PLATELET # BLD: 427 TH/CMM (ref 150–400)
PMV BLD AUTO: 7.5 FL
POTASSIUM SERPL-SCNC: 3.9 MEQ/L (ref 3.5–5.1)
RBC # BLD AUTO: 4.39 MIL/CMM (ref 4.3–5.7)
SODIUM SERPL-SCNC: 135 MEQ/L (ref 136–145)
WBC # BLD AUTO: 6.9 TH/CMM (ref 4.8–10.8)

## 2019-01-14 RX ADMIN — INSULIN ASPART SCH UNITS: 100 INJECTION, SOLUTION INTRAVENOUS; SUBCUTANEOUS at 12:08

## 2019-01-14 RX ADMIN — ALBUTEROL SULFATE PRN MG: 2.5 SOLUTION RESPIRATORY (INHALATION) at 15:36

## 2019-01-14 RX ADMIN — TERBINAFINE HYDROCHLORIDE SCH: 1 CREAM TOPICAL at 08:40

## 2019-01-14 RX ADMIN — PANTOPRAZOLE SODIUM SCH MG: 40 TABLET, DELAYED RELEASE ORAL at 08:27

## 2019-01-14 RX ADMIN — NIFEDIPINE SCH MG: 30 TABLET, FILM COATED, EXTENDED RELEASE ORAL at 08:28

## 2019-01-14 RX ADMIN — INSULIN ASPART SCH UNITS: 100 INJECTION, SOLUTION INTRAVENOUS; SUBCUTANEOUS at 17:52

## 2019-01-14 RX ADMIN — HYDROCODONE BITARTRATE AND ACETAMINOPHEN PRN TAB: 5; 325 TABLET ORAL at 13:28

## 2019-01-14 RX ADMIN — ASPIRIN 81 MG SCH MG: 81 TABLET ORAL at 08:27

## 2019-01-14 RX ADMIN — HYDROCODONE BITARTRATE AND ACETAMINOPHEN PRN TAB: 5; 325 TABLET ORAL at 03:26

## 2019-01-14 RX ADMIN — TERBINAFINE HYDROCHLORIDE SCH: 1 CREAM TOPICAL at 16:25

## 2019-01-14 RX ADMIN — INSULIN ASPART SCH UNITS: 100 INJECTION, SOLUTION INTRAVENOUS; SUBCUTANEOUS at 06:30

## 2019-01-14 RX ADMIN — Medication SCH EACH: at 08:29

## 2019-01-14 RX ADMIN — ALBUTEROL SULFATE PRN MG: 2.5 SOLUTION RESPIRATORY (INHALATION) at 07:28

## 2019-01-14 RX ADMIN — POTASSIUM CHLORIDE SCH MEQ: 20 TABLET, EXTENDED RELEASE ORAL at 08:27

## 2019-01-14 RX ADMIN — INSULIN DETEMIR SCH UNITS: 100 INJECTION, SOLUTION SUBCUTANEOUS at 08:39

## 2020-01-20 ENCOUNTER — HOSPITAL ENCOUNTER (INPATIENT)
Dept: HOSPITAL 4 - SED | Age: 55
LOS: 4 days | Discharge: NURSING, REHAB OR LONG-TERM CARE | DRG: 191 | End: 2020-01-24
Attending: FAMILY MEDICINE | Admitting: FAMILY MEDICINE
Payer: COMMERCIAL

## 2020-01-20 VITALS — SYSTOLIC BLOOD PRESSURE: 129 MMHG

## 2020-01-20 VITALS — BODY MASS INDEX: 38.96 KG/M2 | WEIGHT: 278.31 LBS | HEIGHT: 71 IN

## 2020-01-20 DIAGNOSIS — K21.9: ICD-10-CM

## 2020-01-20 DIAGNOSIS — E66.01: ICD-10-CM

## 2020-01-20 DIAGNOSIS — I50.810: ICD-10-CM

## 2020-01-20 DIAGNOSIS — F41.9: ICD-10-CM

## 2020-01-20 DIAGNOSIS — I48.91: ICD-10-CM

## 2020-01-20 DIAGNOSIS — G47.00: ICD-10-CM

## 2020-01-20 DIAGNOSIS — I11.0: ICD-10-CM

## 2020-01-20 DIAGNOSIS — I25.119: ICD-10-CM

## 2020-01-20 DIAGNOSIS — Z79.4: ICD-10-CM

## 2020-01-20 DIAGNOSIS — Z86.14: ICD-10-CM

## 2020-01-20 DIAGNOSIS — G47.33: ICD-10-CM

## 2020-01-20 DIAGNOSIS — F32.9: ICD-10-CM

## 2020-01-20 DIAGNOSIS — D64.9: ICD-10-CM

## 2020-01-20 DIAGNOSIS — E11.40: ICD-10-CM

## 2020-01-20 DIAGNOSIS — Z79.899: ICD-10-CM

## 2020-01-20 DIAGNOSIS — Z90.49: ICD-10-CM

## 2020-01-20 DIAGNOSIS — K46.9: ICD-10-CM

## 2020-01-20 DIAGNOSIS — J44.1: Primary | ICD-10-CM

## 2020-01-20 DIAGNOSIS — I69.354: ICD-10-CM

## 2020-01-20 LAB
ALBUMIN SERPL BCP-MCNC: 3.2 G/DL (ref 3.4–4.8)
ALT SERPL W P-5'-P-CCNC: 33 U/L (ref 12–78)
ANION GAP SERPL CALCULATED.3IONS-SCNC: 9 MMOL/L (ref 5–15)
AST SERPL W P-5'-P-CCNC: 17 U/L (ref 10–37)
BASOPHILS # BLD AUTO: 0.1 K/UL (ref 0–0.2)
BASOPHILS NFR BLD AUTO: 1.3 % (ref 0–2)
BILIRUB SERPL-MCNC: 0.2 MG/DL (ref 0–1)
BUN SERPL-MCNC: 11 MG/DL (ref 8–21)
CALCIUM SERPL-MCNC: 7.8 MG/DL (ref 8.4–11)
CHLORIDE SERPL-SCNC: 102 MMOL/L (ref 98–107)
CREAT SERPL-MCNC: 0.87 MG/DL (ref 0.55–1.3)
EOSINOPHIL # BLD AUTO: 0.2 K/UL (ref 0–0.4)
EOSINOPHIL NFR BLD AUTO: 2 % (ref 0–4)
ERYTHROCYTE [DISTWIDTH] IN BLOOD BY AUTOMATED COUNT: 19.4 % (ref 9–15)
GFR SERPL CREATININE-BSD FRML MDRD: 117 ML/MIN (ref 90–?)
GLUCOSE SERPL-MCNC: 252 MG/DL (ref 70–99)
HCT VFR BLD AUTO: 28 % (ref 36–54)
HGB BLD-MCNC: 8.2 G/DL (ref 14–18)
INR PPP: 1 (ref 0.8–1.2)
LYMPHOCYTES # BLD AUTO: 2.4 K/UL (ref 1–5.5)
LYMPHOCYTES NFR BLD AUTO: 26.2 % (ref 20.5–51.5)
MCH RBC QN AUTO: 19 PG (ref 27–31)
MCHC RBC AUTO-ENTMCNC: 29 % (ref 32–36)
MCV RBC AUTO: 65 FL (ref 79–98)
MONOCYTES # BLD MANUAL: 0.8 K/UL (ref 0–1)
MONOCYTES # BLD MANUAL: 9.1 % (ref 1.7–9.3)
NEUTROPHILS # BLD AUTO: 5.6 K/UL (ref 1.8–7.7)
NEUTROPHILS NFR BLD AUTO: 61.4 % (ref 40–70)
PLATELET # BLD AUTO: 333 K/UL (ref 130–430)
POTASSIUM SERPL-SCNC: 3.9 MMOL/L (ref 3.5–5.1)
PROTHROMBIN TIME: 9.9 SECS (ref 9.5–12.5)
RBC # BLD AUTO: 4.33 MIL/UL (ref 4.2–6.2)
SODIUM SERPLBLD-SCNC: 137 MMOL/L (ref 136–145)
WBC # BLD AUTO: 9.1 K/UL (ref 4.8–10.8)

## 2020-01-20 PROCEDURE — G0378 HOSPITAL OBSERVATION PER HR: HCPCS

## 2020-01-20 NOTE — NUR
Medication reconciliation completed with information provided by [facility]. 
Any prior medication reconciliation on file was reviewed and corrected.

## 2020-01-20 NOTE — NUR
[facility]. EMS with pt in ER 
hallway until bed becomes available.

## 2020-01-20 NOTE — NUR
Patient will be admitted to care of [facility].  Admitted to telemetry unit.  
Awaiting room placement.  Belongings list completed.  Complete and up to date 
summary report printed. SBAR report to be given at bedside with opportunity for 
questions.

## 2020-01-20 NOTE — NUR
Pt BIB EMS from [facility] with c/o SOB, left sided swelling of hand 
and foot, and congestion. Pt states SOB has gradually increased the last few 
days when he "walks from bedroom to dining anderson." Upon assessment pt has +2 
edema to left leg and left wrist. Pt has +1 edema to right leg. Upon 
assessment, pt has clear bilateral lungs. Pt oxygen saturation at 98% with no 
use of accessory muscles. Will continue monitor.

## 2020-01-21 VITALS — SYSTOLIC BLOOD PRESSURE: 171 MMHG

## 2020-01-21 VITALS — SYSTOLIC BLOOD PRESSURE: 144 MMHG

## 2020-01-21 VITALS — SYSTOLIC BLOOD PRESSURE: 155 MMHG

## 2020-01-21 VITALS — SYSTOLIC BLOOD PRESSURE: 151 MMHG

## 2020-01-21 VITALS — SYSTOLIC BLOOD PRESSURE: 162 MMHG

## 2020-01-21 VITALS — SYSTOLIC BLOOD PRESSURE: 161 MMHG

## 2020-01-21 LAB
ANION GAP SERPL CALCULATED.3IONS-SCNC: 8 MMOL/L (ref 5–15)
BASOPHILS # BLD AUTO: 0 K/UL (ref 0–0.2)
BASOPHILS NFR BLD AUTO: 0.5 % (ref 0–2)
BUN SERPL-MCNC: 12 MG/DL (ref 8–21)
CALCIUM SERPL-MCNC: 7.6 MG/DL (ref 8.4–11)
CHLORIDE SERPL-SCNC: 103 MMOL/L (ref 98–107)
CREAT SERPL-MCNC: 0.79 MG/DL (ref 0.55–1.3)
EOSINOPHIL # BLD AUTO: 0.2 K/UL (ref 0–0.4)
EOSINOPHIL NFR BLD AUTO: 2.1 % (ref 0–4)
ERYTHROCYTE [DISTWIDTH] IN BLOOD BY AUTOMATED COUNT: 18.6 % (ref 9–15)
GFR SERPL CREATININE-BSD FRML MDRD: 131 ML/MIN (ref 90–?)
GLUCOSE SERPL-MCNC: 188 MG/DL (ref 70–99)
HCT VFR BLD AUTO: 27.4 % (ref 36–54)
HGB BLD-MCNC: 8.1 G/DL (ref 14–18)
LYMPHOCYTES # BLD AUTO: 2.3 K/UL (ref 1–5.5)
LYMPHOCYTES NFR BLD AUTO: 24.7 % (ref 20.5–51.5)
MCH RBC QN AUTO: 19 PG (ref 27–31)
MCHC RBC AUTO-ENTMCNC: 29 % (ref 32–36)
MCV RBC AUTO: 65 FL (ref 79–98)
MONOCYTES # BLD MANUAL: 0.9 K/UL (ref 0–1)
MONOCYTES # BLD MANUAL: 9.2 % (ref 1.7–9.3)
NEUTROPHILS # BLD AUTO: 5.9 K/UL (ref 1.8–7.7)
NEUTROPHILS NFR BLD AUTO: 63.5 % (ref 40–70)
PLATELET # BLD AUTO: 297 K/UL (ref 130–430)
POTASSIUM SERPL-SCNC: 3.6 MMOL/L (ref 3.5–5.1)
RBC # BLD AUTO: 4.22 MIL/UL (ref 4.2–6.2)
SODIUM SERPLBLD-SCNC: 138 MMOL/L (ref 136–145)
WBC # BLD AUTO: 9.3 K/UL (ref 4.8–10.8)

## 2020-01-21 RX ADMIN — HUMAN INSULIN PRN UNITS: 100 INJECTION, SOLUTION SUBCUTANEOUS at 11:37

## 2020-01-21 RX ADMIN — Medication SCH EA: at 23:09

## 2020-01-21 RX ADMIN — HUMAN INSULIN PRN UNITS: 100 INJECTION, SOLUTION SUBCUTANEOUS at 17:06

## 2020-01-21 RX ADMIN — Medication SCH EA: at 11:30

## 2020-01-21 RX ADMIN — Medication SCH EA: at 00:00

## 2020-01-21 RX ADMIN — HUMAN INSULIN PRN UNITS: 100 INJECTION, SOLUTION SUBCUTANEOUS at 05:20

## 2020-01-21 RX ADMIN — TEMAZEPAM SCH MG: 15 CAPSULE ORAL at 21:12

## 2020-01-21 RX ADMIN — Medication SCH EA: at 17:04

## 2020-01-21 RX ADMIN — Medication SCH EA: at 05:19

## 2020-01-21 RX ADMIN — TOPIRAMATE SCH MG: 25 TABLET, COATED ORAL at 21:13

## 2020-01-21 RX ADMIN — OXYCODONE AND ACETAMINOPHEN PRN TAB: 10; 325 TABLET ORAL at 08:56

## 2020-01-21 RX ADMIN — GABAPENTIN SCH MG: 300 CAPSULE ORAL at 21:13

## 2020-01-21 RX ADMIN — OXYCODONE AND ACETAMINOPHEN PRN TAB: 10; 325 TABLET ORAL at 14:58

## 2020-01-21 RX ADMIN — HUMAN INSULIN PRN UNITS: 100 INJECTION, SOLUTION SUBCUTANEOUS at 23:12

## 2020-01-21 RX ADMIN — OXYCODONE AND ACETAMINOPHEN PRN TAB: 10; 325 TABLET ORAL at 21:26

## 2020-01-21 NOTE — NUR
Transfer to Telemetry room 123B via ACLS protocol. Licensed nurse present. IV 
present no signs or symptoms of infiltration.

## 2020-01-21 NOTE — NUR
PATIENT REQUESTED TO SIT AT THE EDGE OF THE BED



DESPITE EDUCATIONAL EFFORTS, PATIENT REQUESTED TO SIT AT THE EDGE OF THE BED. WILL MONITOR 
FREQUENTLY. BED IS LOCKED, ALARMED, AND AT THE LOWEST POSITION. PATIENT IS OTHERWISE STABLE. 
NO S/S OF RESPIRATORY DISTRESS NOTED. CALL LIGHT IN REACH. WILL CONTINUE TO MONITOR.

## 2020-01-21 NOTE — NUR
CONSULTATION PAGED/CALLED

Reason for Consultation: COPD/SOB

Person Who was Notified: DR. HEREDIA

Consulting Physician: DR. HEREDIA

Consultant Specialty: PULOMONOLOGIST

Ordering Physician: DR. JACK

## 2020-01-21 NOTE — NUR
Nutrition Update



Ilia Scale 18 noted.

Pt admitted for CHF.

Diet: CCHO, 2 gm Na

BMI: 39.5 kg/m2



RD to follow per nutrition care standards.

## 2020-01-21 NOTE — NUR
ROUNDING



PATIENT IS LAYING IN BED AND WATCHING TV. PATIENT IS STABLE. NO S/S OF RESPIRATORY DISTRESS 
NOTED. CALL LIGHT IN REACH. BED IS LOCKED, ALARMED, AND AT THE LOWEST POSITION. WILL 
CONTINUE TO MONITOR.

## 2020-01-21 NOTE — NUR
INITIAL NOTE



PATIENT IS STABLE AND LAYING IN BED. NO S/S OF RESPIRATORY DISTRESS NOTED. PLAN OF CARE IS 
DISCUSSED WITH PATIENT AT THIS TIME. FALL, SAFETY, ASPIRATION, AND RESPIRATORY PRECAUTIONS 
WILL BE IN PLACE THROUGHOUT THE SHIFT. BED IS LOCKED, ALARMED, AND AT THE LOWEST POSITION. 
WILL CONTINUE TO MONITOR.

## 2020-01-21 NOTE — NUR
Consultation Paged



Reason for Consultation: CHF

Was consult called: Y

Person who was notified: Radha

Consulting Physician: Nate Carson

Consultant Phone Number : [phone]

Ordering Physician: Dr. Mckinney

## 2020-01-21 NOTE — NUR
[facility] house supervisor called and would like to be updated on 
pt. House supervisor's name is Marco A and phone number is [phone].

## 2020-01-21 NOTE — NUR
INITIAL NOTES



PATIENT IS LAYING IN BED AND STABLE. NO S/S OF RESPIRATORY DISTRESS NOTED. PATIENT WAS ABLE 
TO SUCCESSFULLY DEMONSTRATE USAGE OF CALL LIGHT AT THIS TIME. PLAN OF CARE WAS DISCUSSED 
WITH PATIENT. BED IS LOCKED, ALARMED, AND AT THE LOWEST POSITION. FALL, SAFETY, ASPIRATION, 
CONTACT, AND RESPIRATORY PRECAUTIONS WILL BE IN PLACE THROUGHOUT THE SHIFT. WILL CONTINUE TO 
MONITOR.

## 2020-01-21 NOTE — NUR
CLOSING NOTES



PATIENT IS RESTING IN BED AND STABLE. NO S/S OF RESPIRATORY DISTRESS NOTED. CALL LIGHT IN 
REACH. BED IS LOCKED, ALARMED, AND AT THE LOWEST POSITION. FALL, SAFETY, ASPIRATION, 
CONTACT, AND RESPIRATORY PRECAUTIONS HAS BEEN PLACE THROUGHOUT THE SHIFT. WILL CONTINUE TO 
MONITOR UNTIL REPORT IS GIVEN TO AM NURSE.

## 2020-01-21 NOTE — NUR
CONSULTATION PAGED/CALLED

Reason for Consultation: SOB

Person Who was Notified: SPOKE WITH CHANDLER FROM GENEVA CHAMBERLAIN

Consulting Physician: GENEVA CHAMBERLAIN 

Consultant Specialty: ID

Ordering Physician: 

FACE SHEET WAS FAXED [phone]

## 2020-01-21 NOTE — NUR
PATIENT REFUSED INSULIN



PATIENT REFUSED INSULIN DESPITE EDUCATIONAL EFFORTS. GLUCOSE . WILL CONTINUE TO 
EDUCATE.

## 2020-01-21 NOTE — NUR
ADMISSION:

The patient, ALDAIR LONG, 53 y/o, M admitted by ELMO JACK MD, was given written 
information regarding hospital policies, unit procedures and contact persons.  



Valuables were checked and signed.

## 2020-01-21 NOTE — NUR
AM rounds:

Patient is awake, oriented x4. Ambulates to the bathroom with minimal assist. No shortness 
of breath, no chest pain. Call light within reach, safety precautions in place.

## 2020-01-21 NOTE — NUR
COMMUNICATED WITH DR. JACK



COMMUNICATED WITH DR. JACK. ORDERS RECEIVED. WILL CONTINUE WITH ORDERS.

## 2020-01-21 NOTE — NUR
Due to Code blue, charge nurse and MD Peek off unit. ER tech on break. 3 nurses 
must be in ER when MD off unit. Will continue to monitor pt and transfer when 
available staff.

## 2020-01-22 VITALS — SYSTOLIC BLOOD PRESSURE: 109 MMHG

## 2020-01-22 VITALS — SYSTOLIC BLOOD PRESSURE: 142 MMHG

## 2020-01-22 VITALS — SYSTOLIC BLOOD PRESSURE: 131 MMHG

## 2020-01-22 VITALS — SYSTOLIC BLOOD PRESSURE: 132 MMHG

## 2020-01-22 VITALS — SYSTOLIC BLOOD PRESSURE: 124 MMHG

## 2020-01-22 LAB
ANION GAP SERPL CALCULATED.3IONS-SCNC: 7 MMOL/L (ref 5–15)
BUN SERPL-MCNC: 10 MG/DL (ref 8–21)
CALCIUM SERPL-MCNC: 8.1 MG/DL (ref 8.4–11)
CHLORIDE SERPL-SCNC: 100 MMOL/L (ref 98–107)
CHOLEST SERPL-MCNC: 155 MG/DL (ref ?–200)
CREAT SERPL-MCNC: 0.7 MG/DL (ref 0.55–1.3)
GFR SERPL CREATININE-BSD FRML MDRD: 151 ML/MIN (ref 90–?)
GLUCOSE SERPL-MCNC: 225 MG/DL (ref 70–99)
HDLC SERPL-MCNC: 17 MG/DL (ref 45–?)
LDL CHOLESTEROL: 59 MG/DL (ref ?–100)
POTASSIUM SERPL-SCNC: 3.6 MMOL/L (ref 3.5–5.1)
SODIUM SERPLBLD-SCNC: 134 MMOL/L (ref 136–145)
TRIGL SERPL-MCNC: 792 MG/DL (ref 30–150)

## 2020-01-22 RX ADMIN — HUMAN INSULIN PRN UNITS: 100 INJECTION, SOLUTION SUBCUTANEOUS at 11:22

## 2020-01-22 RX ADMIN — ENOXAPARIN SODIUM SCH MG: 40 INJECTION SUBCUTANEOUS at 08:36

## 2020-01-22 RX ADMIN — GABAPENTIN SCH MG: 300 CAPSULE ORAL at 08:29

## 2020-01-22 RX ADMIN — ALBUTEROL SULFATE SCH MG: 2.5 SOLUTION RESPIRATORY (INHALATION) at 19:33

## 2020-01-22 RX ADMIN — OXYCODONE AND ACETAMINOPHEN PRN TAB: 10; 325 TABLET ORAL at 19:59

## 2020-01-22 RX ADMIN — LISINOPRIL SCH MG: 5 TABLET ORAL at 08:31

## 2020-01-22 RX ADMIN — ONDANSETRON SCH MG: 4 TABLET, ORALLY DISINTEGRATING ORAL at 08:29

## 2020-01-22 RX ADMIN — HUMAN INSULIN PRN UNITS: 100 INJECTION, SOLUTION SUBCUTANEOUS at 05:37

## 2020-01-22 RX ADMIN — Medication SCH EA: at 05:31

## 2020-01-22 RX ADMIN — IPRATROPIUM BROMIDE SCH MG: 0.5 SOLUTION RESPIRATORY (INHALATION) at 19:33

## 2020-01-22 RX ADMIN — IPRATROPIUM BROMIDE SCH MG: 0.5 SOLUTION RESPIRATORY (INHALATION) at 13:49

## 2020-01-22 RX ADMIN — Medication SCH MG: at 08:30

## 2020-01-22 RX ADMIN — ALBUTEROL SULFATE SCH MG: 2.5 SOLUTION RESPIRATORY (INHALATION) at 13:49

## 2020-01-22 RX ADMIN — TEMAZEPAM SCH MG: 15 CAPSULE ORAL at 21:04

## 2020-01-22 RX ADMIN — HUMAN INSULIN PRN UNITS: 100 INJECTION, SOLUTION SUBCUTANEOUS at 17:25

## 2020-01-22 RX ADMIN — Medication SCH EA: at 17:20

## 2020-01-22 RX ADMIN — Medication SCH EA: at 23:52

## 2020-01-22 RX ADMIN — POTASSIUM CHLORIDE SCH MEQ: 20 TABLET, EXTENDED RELEASE ORAL at 08:29

## 2020-01-22 RX ADMIN — Medication SCH EA: at 11:19

## 2020-01-22 RX ADMIN — TOPIRAMATE SCH MG: 25 TABLET, COATED ORAL at 21:04

## 2020-01-22 RX ADMIN — GABAPENTIN SCH MG: 300 CAPSULE ORAL at 21:04

## 2020-01-22 RX ADMIN — HUMAN INSULIN PRN UNITS: 100 INJECTION, SOLUTION SUBCUTANEOUS at 23:55

## 2020-01-22 RX ADMIN — PANTOPRAZOLE SODIUM SCH MG: 40 TABLET, DELAYED RELEASE ORAL at 08:31

## 2020-01-22 NOTE — NUR
Note

Pt sitting up in bed eating his breakfast. No SOB/resp distress or chest pain/discomfort was 
noted at this time. Tele unit attached and intact at this time. IV in right forearm intact 
and patent. No needs noted. Call light within reach.

## 2020-01-22 NOTE — NUR
change of shift.pt.presents general status stable.pt.ambulates  utilizing the assist 
device;walker.respiratory status stable;

unlabored @room air.

pt.presents iv acces intact;patent.call light/telephone w/in reach of the pt.

## 2020-01-22 NOTE — NUR
2100pmedications administered.pt.capable to ingest medication whole w/out difficulty.no 
requests posited@this hour.

## 2020-01-22 NOTE — NUR
PATIENT WALKED TO THE RESTROOM



PATIENT WALKED TO THE RESTROOM AT THIS TIME. PATIENT TOLERATED WELL. PATIENT HAD A BOWEL 
MOVEMENT. PATIENT IS BACK IN BED. NO S/S OF RESPIRATORY DISTRESS. CALL LIGHT IN REACH. BED 
IS LOCKED, ALARMED, AND AT THE LOWEST POSITION.

## 2020-01-22 NOTE — NUR
pt.assessed.pt.presents quiescent affect;calm,resting.no c/o pain,nausea.i have attended to 
the urinal;measured/placed 

w/in reach of the pt.

no c/o pain,nausea.no requests posited@this hour.general status stable.respiratory status 
stable;unlabored.pt.capable to 

reposition self.

call light/telephone placed w/in reach of the pt.

## 2020-01-22 NOTE — NUR
CLOSING NOTES



PATIENT IS LAYING IN BED AND STABLE. NO S/S OF RESPIRATORY DISTRESS NOTED. CALL LIGHT IN 
REACH. BED IS LOCKED, ALARMED, AND AT THE LOWEST POSITION. FALL, SAFETY, ASPIRATION, 
CONTACT, AND RESPIRATORY PRECAUTIONS HAS BEEN PLACE THROUGHOUT THE SHIFT. WILL CONTINUE TO 
MONITOR UNTIL REPORT IS GIVEN TO AM NURSE BY BEDSIDE.

## 2020-01-22 NOTE — NUR
Discharge Plan Assessment Complete

LCSW met with patient at bedside. Patient is alert and oriented. He has a DNR order. He has 
lived at [facility] for "a long time" and plans to return. He does not want his 
sister called and makes his own decisions. Phoned [facility], [phone], and left 
a message for a return call. No barriers apparent for return to SNF. /Case 
Management/Discharge Coordinator will remain available.

## 2020-01-22 NOTE — NUR
Note

Pt asleep in bed. Denies any needs at this time. Denies any pain/discomfort at this time. 
Call light within reach.

## 2020-01-22 NOTE — NUR
ROUNDING



PATIENT IS SLEEPING IN BED AND STABLE. NO S/S OF RESPIRATORY DISTRESS NOTED. CALL LIGHT IN 
REACH. BED IS LOCKED, ALARMED, AND AT THE LOWEST POSITION. WILL CONTINUE TO MONITOR.

## 2020-01-22 NOTE — NUR
pt.assessed.v/s assessed;values w/in normal limits.i have apprised the pt.that i may provide 
snacks/beverages w/in 

the shift.

pt.has requested snacks.i have provided various snack items.general status 
stable.respiratory status stable@room 

air;

02-sat%=98%@room air.iv access intact;patent.pt.capable to reposition self/ambulate 
utilizing the walker.call light/

telephone w/in reach of the pt.i have inspected the urinal;clean w/in reach of the pt.

-------------------------------------------------------------------------------

Addendum: 01/23/20 at 0130 by Luis Siddiqi RN

-------------------------------------------------------------------------------

pt.had requested medication;pain.i have administered percocet;10/325mg po.to re-assess the 
medication efficacy per 

pain mgx protocol.

## 2020-01-22 NOTE — NUR
ROUNDING



PATIENT IS LAYING IN BED AND STABLE. PATIENT IS WATCHING TV. NO S/S OF RESPIRATORY DISTRESS 
NOTED. CALL LIGHT IN REACH. BED IS LOCKED, ALARMED, AND AT THE LOWEST POSITION. WILL 
CONTINUE TO MONITOR.

## 2020-01-22 NOTE — NUR
Note

Pt ambulated OOB to restroom with FWW to restroom for bowel movement. Pt was checked on q1' 
and PRN all shift for needs and care. No SOB/resp distress or chest pain/discomfort noted at 
this time. Tele unit attached and intact. IV in right hand intact and patent. No needs noted 
 at this time. Call light within reach. Pt sitting on side of bed eating his dinner.

## 2020-01-23 VITALS — SYSTOLIC BLOOD PRESSURE: 132 MMHG

## 2020-01-23 VITALS — SYSTOLIC BLOOD PRESSURE: 108 MMHG

## 2020-01-23 VITALS — SYSTOLIC BLOOD PRESSURE: 124 MMHG

## 2020-01-23 VITALS — SYSTOLIC BLOOD PRESSURE: 125 MMHG

## 2020-01-23 VITALS — SYSTOLIC BLOOD PRESSURE: 118 MMHG

## 2020-01-23 LAB
ANION GAP SERPL CALCULATED.3IONS-SCNC: 5 MMOL/L (ref 5–15)
BUN SERPL-MCNC: 13 MG/DL (ref 8–21)
CALCIUM SERPL-MCNC: 8.2 MG/DL (ref 8.4–11)
CHLORIDE SERPL-SCNC: 98 MMOL/L (ref 98–107)
CREAT SERPL-MCNC: 0.96 MG/DL (ref 0.55–1.3)
GFR SERPL CREATININE-BSD FRML MDRD: 105 ML/MIN (ref 90–?)
GLUCOSE SERPL-MCNC: 345 MG/DL (ref 70–99)
POTASSIUM SERPL-SCNC: 4.2 MMOL/L (ref 3.5–5.1)
SODIUM SERPLBLD-SCNC: 131 MMOL/L (ref 136–145)

## 2020-01-23 RX ADMIN — IPRATROPIUM BROMIDE SCH MG: 0.5 SOLUTION RESPIRATORY (INHALATION) at 07:00

## 2020-01-23 RX ADMIN — ALBUTEROL SULFATE SCH MG: 2.5 SOLUTION RESPIRATORY (INHALATION) at 01:00

## 2020-01-23 RX ADMIN — Medication SCH EA: at 11:38

## 2020-01-23 RX ADMIN — ALBUTEROL SULFATE SCH MG: 2.5 SOLUTION RESPIRATORY (INHALATION) at 13:00

## 2020-01-23 RX ADMIN — IPRATROPIUM BROMIDE SCH MG: 0.5 SOLUTION RESPIRATORY (INHALATION) at 13:00

## 2020-01-23 RX ADMIN — GABAPENTIN SCH MG: 300 CAPSULE ORAL at 08:44

## 2020-01-23 RX ADMIN — IPRATROPIUM BROMIDE SCH MG: 0.5 SOLUTION RESPIRATORY (INHALATION) at 01:00

## 2020-01-23 RX ADMIN — ONDANSETRON SCH MG: 4 TABLET, ORALLY DISINTEGRATING ORAL at 08:46

## 2020-01-23 RX ADMIN — OXYCODONE AND ACETAMINOPHEN PRN TAB: 10; 325 TABLET ORAL at 21:08

## 2020-01-23 RX ADMIN — TOPIRAMATE SCH MG: 25 TABLET, COATED ORAL at 21:37

## 2020-01-23 RX ADMIN — IPRATROPIUM BROMIDE SCH MG: 0.5 SOLUTION RESPIRATORY (INHALATION) at 19:00

## 2020-01-23 RX ADMIN — HUMAN INSULIN PRN UNITS: 100 INJECTION, SOLUTION SUBCUTANEOUS at 11:40

## 2020-01-23 RX ADMIN — ALBUTEROL SULFATE SCH MG: 2.5 SOLUTION RESPIRATORY (INHALATION) at 19:00

## 2020-01-23 RX ADMIN — GABAPENTIN SCH MG: 300 CAPSULE ORAL at 21:34

## 2020-01-23 RX ADMIN — Medication SCH EA: at 17:09

## 2020-01-23 RX ADMIN — Medication SCH MG: at 08:45

## 2020-01-23 RX ADMIN — OXYCODONE AND ACETAMINOPHEN PRN TAB: 10; 325 TABLET ORAL at 02:38

## 2020-01-23 RX ADMIN — Medication SCH EA: at 05:21

## 2020-01-23 RX ADMIN — ALBUTEROL SULFATE SCH MG: 2.5 SOLUTION RESPIRATORY (INHALATION) at 07:00

## 2020-01-23 RX ADMIN — PANTOPRAZOLE SODIUM SCH MG: 40 TABLET, DELAYED RELEASE ORAL at 08:44

## 2020-01-23 RX ADMIN — HUMAN INSULIN PRN UNITS: 100 INJECTION, SOLUTION SUBCUTANEOUS at 05:28

## 2020-01-23 RX ADMIN — ENOXAPARIN SODIUM SCH MG: 40 INJECTION SUBCUTANEOUS at 08:53

## 2020-01-23 RX ADMIN — TEMAZEPAM SCH MG: 15 CAPSULE ORAL at 21:32

## 2020-01-23 RX ADMIN — POTASSIUM CHLORIDE SCH MEQ: 20 TABLET, EXTENDED RELEASE ORAL at 08:47

## 2020-01-23 RX ADMIN — FUROSEMIDE SCH MG: 10 INJECTION, SOLUTION INTRAMUSCULAR; INTRAVENOUS at 08:43

## 2020-01-23 RX ADMIN — HUMAN INSULIN PRN UNITS: 100 INJECTION, SOLUTION SUBCUTANEOUS at 17:11

## 2020-01-23 RX ADMIN — LISINOPRIL SCH MG: 5 TABLET ORAL at 08:48

## 2020-01-23 NOTE — NUR
Note

Pt sitting on side of bed eating his dinner. No SOB/resp distress or chest pain/discomfort 
was noted all shift. Pt was checked on q1' and PRN all shift for needs and care. IV in right 
AC intact and patent at this time. Pt uses FWW to ambulate to restroom with standby assist - 
as pt has left sided weakness on left leg and arm. No needs noted at this time. Call light 
within reach. Tele unit attached and intact at this time.

## 2020-01-23 NOTE — NUR
pt assessed.pt.presents quiescent affect;calm,resting.i have assessed th blood 
glucose:value;272mg/dl.i have 

administered:

insulin:regular:6-units.no c/o pain,nausea.urinal w/in reach of the pt.call light//telephone 
w/in reach of the pt.

## 2020-01-23 NOTE — NUR
REPORT RECIEVED @ START OF SHIFT, PATIENT A/O/X/3 WITH FORGETFULNESS, RESPIRATIONS EVEN AND 
UNLABORED, ROOM AIR,ABLE TO AMBULATE FROM BED TO RESTROOM USING WALKER,VOIDING CLEAR YELLOW 
URINE, S/L PATENT/INTACT TO RAC # 20G, NOTED LEFT SIDED WEAKNESS, SPEECH IS CLEAR AND SLOW, 
C/O ABDOMINAL PAIN, MEDICATED WITH PERCOCET PO TAB.1, WILL CONTINUE TO MONITOR FOR ANY S/S 
OF DISTRESS. SR'S UP X'S 2, CALL LIGHT WITHIN REACH, BED IN LOW POSITION.

## 2020-01-23 NOTE — NUR
pt.assessed.pt.presents quiescent affect;calm,somnolent.i have inspected the 
urinal;clean.w/in reach of the pt.

general status stable.respiratory status stable;unlabored.pt.,capable to reposition 
self.call light/telephone 

w/in reach of the pt.

## 2020-01-23 NOTE — NUR
pt.had requested medication;pain.i have administered percocet;10/325mg po 1 tab.no 
additional requests posited @this hour.

## 2020-01-23 NOTE — NUR
pt.assessed.pt.presents quiescent affect;calm,resting,viewing tv programming.i have 
inspected the urinal;clean.pt.

alternates to the restroom utilizing the urinal.w/in reach of the pt.pt.capable to 
reposition self.oob/brp utilizing the 

walker.general statu stable.respiratory status stable.;unlabored.call light/telephone w/in 
reach of the pt.no requests 

posited @this hour.

## 2020-01-23 NOTE — NUR
Note

Pt sitting up in bed watching television - denies any needs at this time. Denies any 
SOB/resp distress or chest pain/discomfort at this time. Call light within reach.

## 2020-01-23 NOTE — NUR
Note

Pt sitting on side of bed eating his breakfast. Tele unit attached and intact at this time. 
IV in right AC intact and patent. No SOB/resp distress or chest pain/discomfort noted at 
this time. Call light within reach.

## 2020-01-23 NOTE — NUR
Note

Pt used FWW and ambulated to restroom with steady gait. No needs noted at this time. Call 
light within reach. Dr Carranza (ID) on the floor and assessment was done. Call light within 
reach.

## 2020-01-23 NOTE — NUR
pt.assessed.v/s assessed;values w/in normal limits.no c/o pain,nausea.i have assessed the 
blood glucose;value;326mg/dl.

i have administered

insulin;regular:8-units.pt had request snacks/i caren provided the snacks items.i have 
inspected the urinal;clean w/in reach 

of the pt.

general status stable.respiratory status stable.pt.capable to reposition self.call 
light/telephone w/in reach of the pt.

## 2020-01-24 VITALS — SYSTOLIC BLOOD PRESSURE: 107 MMHG

## 2020-01-24 VITALS — SYSTOLIC BLOOD PRESSURE: 118 MMHG

## 2020-01-24 VITALS — SYSTOLIC BLOOD PRESSURE: 115 MMHG

## 2020-01-24 VITALS — SYSTOLIC BLOOD PRESSURE: 121 MMHG

## 2020-01-24 VITALS — SYSTOLIC BLOOD PRESSURE: 124 MMHG

## 2020-01-24 RX ADMIN — Medication SCH EA: at 10:52

## 2020-01-24 RX ADMIN — ALBUTEROL SULFATE SCH MG: 2.5 SOLUTION RESPIRATORY (INHALATION) at 01:00

## 2020-01-24 RX ADMIN — FUROSEMIDE SCH MG: 10 INJECTION, SOLUTION INTRAMUSCULAR; INTRAVENOUS at 08:29

## 2020-01-24 RX ADMIN — IPRATROPIUM BROMIDE SCH MG: 0.5 SOLUTION RESPIRATORY (INHALATION) at 13:12

## 2020-01-24 RX ADMIN — IPRATROPIUM BROMIDE SCH MG: 0.5 SOLUTION RESPIRATORY (INHALATION) at 01:00

## 2020-01-24 RX ADMIN — ONDANSETRON SCH MG: 4 TABLET, ORALLY DISINTEGRATING ORAL at 08:27

## 2020-01-24 RX ADMIN — Medication SCH MG: at 08:29

## 2020-01-24 RX ADMIN — ALBUTEROL SULFATE SCH MG: 2.5 SOLUTION RESPIRATORY (INHALATION) at 07:02

## 2020-01-24 RX ADMIN — OXYCODONE AND ACETAMINOPHEN PRN TAB: 10; 325 TABLET ORAL at 03:56

## 2020-01-24 RX ADMIN — ALBUTEROL SULFATE SCH MG: 2.5 SOLUTION RESPIRATORY (INHALATION) at 13:12

## 2020-01-24 RX ADMIN — HUMAN INSULIN PRN UNITS: 100 INJECTION, SOLUTION SUBCUTANEOUS at 06:05

## 2020-01-24 RX ADMIN — OXYCODONE AND ACETAMINOPHEN PRN TAB: 10; 325 TABLET ORAL at 13:47

## 2020-01-24 RX ADMIN — Medication SCH EA: at 05:54

## 2020-01-24 RX ADMIN — LISINOPRIL SCH MG: 5 TABLET ORAL at 08:28

## 2020-01-24 RX ADMIN — HUMAN INSULIN PRN UNITS: 100 INJECTION, SOLUTION SUBCUTANEOUS at 10:53

## 2020-01-24 RX ADMIN — ENOXAPARIN SODIUM SCH MG: 40 INJECTION SUBCUTANEOUS at 08:30

## 2020-01-24 RX ADMIN — HUMAN INSULIN PRN UNITS: 100 INJECTION, SOLUTION SUBCUTANEOUS at 01:23

## 2020-01-24 RX ADMIN — PANTOPRAZOLE SODIUM SCH MG: 40 TABLET, DELAYED RELEASE ORAL at 08:27

## 2020-01-24 RX ADMIN — GABAPENTIN SCH MG: 300 CAPSULE ORAL at 08:27

## 2020-01-24 RX ADMIN — POTASSIUM CHLORIDE SCH MEQ: 20 TABLET, EXTENDED RELEASE ORAL at 08:27

## 2020-01-24 RX ADMIN — Medication SCH EA: at 01:22

## 2020-01-24 RX ADMIN — IPRATROPIUM BROMIDE SCH MG: 0.5 SOLUTION RESPIRATORY (INHALATION) at 07:02

## 2020-01-24 NOTE — NUR
CALLED TRACY RAPHAEL, SISTER [phone], DID NOT ANSWER. COULD NOT LEAVE A VOICEMAIL 
DUE TO MESSAGE SYSTEM SAYING, "THIS PERSON YOU ARE CALLING CANNOT ACCEPT CALLS AT THIS TIME. 
WE'RE SORRY FOR ANY INCONVENIENCE THAT THIS MAY CAUSE."

## 2020-01-24 NOTE — NUR
D/C Patient

Patient given medication reconciliation form and D/C instructions. Exit Care provided. 
Patient verbalized understanding. MD discussed with patient the results and treatment 
provided. Ambulatory with assist. Patient in stable condition, ID band removed. IV catheter 
removed, intact and dressing applied, no active bleeding. Patient educated on pain 
management. All belongings sent with patient.

## 2020-01-24 NOTE — NUR
MEDICATED WITH PERCOCET TAB 1 PO FOR C/O ABDOMINAL PAIN, TOLERATING PO FLUIDS WELL, 
CONTINUES TO VOID IN URINAL WITHOUT DIFFICULTY, ALL NEEDS ANTICIPATED AND MET, WILL CONTINUE 
TYO MONITOR FOR ANY S/S OF DISTRESS.

## 2020-01-24 NOTE — NUR
ACCUCHECK

ACCUCHECK DONE. RESULT 277 MG/DL. EDUCATION GIVEN, TOLERATED WELL. INSULIN COVERAGE 
ADMINISTERED AS ORDERED PER MD, EDUCATION GIVEN, TOLERATED WELL. NO ACUTE DISTRESS NOTED. 
ALL NEEDS MET. CALL LIGHT IN REACH. CONTINUE TO MONITOR.

## 2020-01-24 NOTE — NUR
ROUTINE MEDS

ROUTINE MEDS ADMINISTERED AS ORDERED PER MD. EDUCATION GIVEN, TOLERATED WELL. NO ACUTE 
DISTRESS NOTED. ALL NEEDS MET. CALL LIGHT IN REACH. CONTINUE TO MONITOR.

## 2020-01-24 NOTE — NUR
ROUNDS

PT AWAKE, ALERT, AND ORIENTED. NO ACUTE DISTRESS NOTED. NONLABORED BREATHING NOTED. ALL 
NEEDS MET. CALL LIGHT IN REACH. FALL AND ASPIRATION PRECAUTIONS IN PLACE. CONTINUE TO 
MONITOR.

## 2020-01-24 NOTE — NUR
ROUTINE MEDS

ROUTINE MEDS ADMINISTERED AS ORDERED PER MD, EDUCATION GIVEN, TOLERATED WELL. NO ACUTE 
DISTRESS NOTED. ALL NEEDS MET. CALL LIGHT IN REACH. FALL AND ASPIRATION PRECAUTIONS IN 
PLACE. CONTINUE TO MONITOR.

## 2020-01-24 NOTE — NUR
BLOOD GLUCOSE RESULTS = 280, GIVEN REGULAR INSULIN 6 UNITS SUBQ PER SLIDING SCALE COVERAGE, 
DENIES PAIN, CONTINUES TO REST QUIETLY IN BED WITH EYES CLOSED, EASILY AROUSED, NO NOTED 
CHANGES IN PRESENT CONDITION.

## 2020-01-24 NOTE — NUR
DC PLANNING

Per  pt accepted back to [facility] room 19C. Spoke w pt @ bedside & 
informed. Is agreeable w transfer back to [facility] today, states 19 C is his room.

## 2020-01-24 NOTE — NUR
Discharge Planning:

Pt has order to DC back to SNF; [facility] has faxed referral to [facility] 
([phone] [phone]); [facility] will follow up on bed availability. 


-------------------------------------------------------------------------------

Addendum: 01/24/20 at 1351 by Fani Suazo LCSW

-------------------------------------------------------------------------------

Pt has been accepted back to [facility]; [facility] spoke with Catie; pt will be returning to [facility]. 
Transportation has been set up with [facility] ([phone]);  set for 4:00pm.

## 2020-02-29 ENCOUNTER — HOSPITAL ENCOUNTER (EMERGENCY)
Dept: HOSPITAL 4 - SED | Age: 55
Discharge: HOME | End: 2020-02-29
Payer: COMMERCIAL

## 2020-02-29 VITALS — SYSTOLIC BLOOD PRESSURE: 118 MMHG

## 2020-02-29 VITALS — BODY MASS INDEX: 37.25 KG/M2 | HEIGHT: 72 IN | WEIGHT: 275 LBS

## 2020-02-29 VITALS — SYSTOLIC BLOOD PRESSURE: 130 MMHG

## 2020-02-29 DIAGNOSIS — Z79.4: ICD-10-CM

## 2020-02-29 DIAGNOSIS — E11.9: ICD-10-CM

## 2020-02-29 DIAGNOSIS — I11.0: ICD-10-CM

## 2020-02-29 DIAGNOSIS — I50.9: ICD-10-CM

## 2020-02-29 DIAGNOSIS — R10.32: Primary | ICD-10-CM

## 2020-02-29 DIAGNOSIS — Z86.79: ICD-10-CM

## 2020-02-29 DIAGNOSIS — Z79.899: ICD-10-CM

## 2020-02-29 LAB
ALBUMIN SERPL BCP-MCNC: 3.1 G/DL (ref 3.4–4.8)
ALT SERPL W P-5'-P-CCNC: 34 U/L (ref 12–78)
ANION GAP SERPL CALCULATED.3IONS-SCNC: 8 MMOL/L (ref 5–15)
APPEARANCE UR: CLEAR
AST SERPL W P-5'-P-CCNC: 41 U/L (ref 10–37)
BACTERIA URNS QL MICRO: (no result) /HPF
BASOPHILS NFR BLD MANUAL: 0 % (ref 0–2)
BILIRUB SERPL-MCNC: 0.2 MG/DL (ref 0–1)
BILIRUB UR QL STRIP: NEGATIVE
BUN SERPL-MCNC: 13 MG/DL (ref 8–21)
CALCIUM SERPL-MCNC: 7.8 MG/DL (ref 8.4–11)
CHLORIDE SERPL-SCNC: 103 MMOL/L (ref 98–107)
COLOR UR: YELLOW
CREAT SERPL-MCNC: 0.84 MG/DL (ref 0.55–1.3)
EOSINOPHIL NFR BLD MANUAL: 4 % (ref 0–7)
ERYTHROCYTE [DISTWIDTH] IN BLOOD BY AUTOMATED COUNT: 19.5 % (ref 9–15)
GFR SERPL CREATININE-BSD FRML MDRD: 122 ML/MIN (ref 90–?)
GLUCOSE SERPL-MCNC: 303 MG/DL (ref 70–99)
GLUCOSE UR STRIP-MCNC: (no result) MG/DL
HCT VFR BLD AUTO: 29.2 % (ref 36–54)
HGB BLD-MCNC: 8.7 G/DL (ref 14–18)
HGB UR QL STRIP: NEGATIVE
INR PPP: 1 (ref 0.8–1.2)
KETONES UR STRIP-MCNC: NEGATIVE MG/DL
LEUKOCYTE ESTERASE UR QL STRIP: NEGATIVE
LIPASE SERPL-CCNC: 161 U/L (ref 73–393)
LYMPHOCYTES NFR BLD MANUAL: 27 % (ref 20–46)
MCH RBC QN AUTO: 19 PG (ref 27–31)
MCHC RBC AUTO-ENTMCNC: 30 % (ref 32–36)
MCV RBC AUTO: 64 FL (ref 79–98)
MONOCYTES # BLD MANUAL: 10 % (ref 0–11)
NITRITE UR QL STRIP: NEGATIVE
PH UR STRIP: 5.5 [PH] (ref 5–8)
PLATELET # BLD AUTO: 355 K/UL (ref 130–430)
POTASSIUM SERPL-SCNC: 4.2 MMOL/L (ref 3.5–5.1)
PROT UR QL STRIP: NEGATIVE
PROTHROMBIN TIME: 10.3 SECS (ref 9.5–12.5)
RBC # BLD AUTO: 4.54 MIL/UL (ref 4.2–6.2)
RBC #/AREA URNS HPF: (no result) /HPF (ref 0–3)
SODIUM SERPLBLD-SCNC: 138 MMOL/L (ref 136–145)
SP GR UR STRIP: 1.02 (ref 1–1.03)
UROBILINOGEN UR STRIP-MCNC: 0.2 MG/DL (ref 0.2–1)
WBC # BLD AUTO: 8 K/UL (ref 4.8–10.8)
WBC #/AREA URNS HPF: (no result) /HPF (ref 0–3)

## 2020-02-29 PROCEDURE — 96365 THER/PROPH/DIAG IV INF INIT: CPT

## 2020-02-29 PROCEDURE — 96375 TX/PRO/DX INJ NEW DRUG ADDON: CPT

## 2020-02-29 PROCEDURE — 83605 ASSAY OF LACTIC ACID: CPT

## 2020-02-29 PROCEDURE — 93005 ELECTROCARDIOGRAM TRACING: CPT

## 2020-02-29 PROCEDURE — 87040 BLOOD CULTURE FOR BACTERIA: CPT

## 2020-02-29 PROCEDURE — 36415 COLL VENOUS BLD VENIPUNCTURE: CPT

## 2020-02-29 PROCEDURE — 99285 EMERGENCY DEPT VISIT HI MDM: CPT

## 2020-02-29 PROCEDURE — 71045 X-RAY EXAM CHEST 1 VIEW: CPT

## 2020-02-29 PROCEDURE — 85007 BL SMEAR W/DIFF WBC COUNT: CPT

## 2020-02-29 PROCEDURE — 80053 COMPREHEN METABOLIC PANEL: CPT

## 2020-02-29 PROCEDURE — 85610 PROTHROMBIN TIME: CPT

## 2020-02-29 PROCEDURE — 81000 URINALYSIS NONAUTO W/SCOPE: CPT

## 2020-02-29 PROCEDURE — 85027 COMPLETE CBC AUTOMATED: CPT

## 2020-02-29 PROCEDURE — 74176 CT ABD & PELVIS W/O CONTRAST: CPT

## 2020-02-29 PROCEDURE — 83690 ASSAY OF LIPASE: CPT

## 2020-02-29 NOTE — NUR
Called [facility] regarding transport [phone] that was set up. Spoke with transfer 
representative and he stated that the transport was set up on the wrong date. 
He states he scheduled a new  time for today between [phone].

## 2020-02-29 NOTE — NUR
pt will be [facility] back [facility]. Report given to Jane GANN 
supervisor

## 2020-02-29 NOTE — NUR
Patient given written and verbal discharge instructions and verbalizes 
understanding.  ER MD discussed with patient the results and treatment 
provided. Patient in stable condition. ID arm band removed. IV catheter removed 
intact and dressing applied, no active bleeding.

Patient educated on pain management and to follow up with PMD. Pain Scale 0/10.

Opportunity for questions provided and answered. Medication side effect fact 
sheet provided. Pt will be to [facility]. Report given to Jane rn 
supervisor. [facility] ambulance here to  the pt.

## 2020-02-29 NOTE — NUR
PT STATES HE HAS BEEN HAVING PAIN TO LEFT LOWER QUADRANT RADIATING UP TO LEFT 
UPPER QUADRANT, PRESSURE LIKE PAIN. PT STATES THAT WHEN HE TAKES PERCOCET 10MG 
IT HELPS FOR A FEW HOURS.

## 2020-09-09 NOTE — INTERNAL MEDICINE PROG NOTE
Internal Medicine Subjective





- Subjective


Patient seen and examined:: chart reviewed


Patient is:: awake, talking


Patient Complaints of:: congestion


Per staff patient has:: no adverse event





Internal Medicine Objective





- Results


Result Diagrams: 


 19 05:30





 19 05:30


Recent Labs: 


 Laboratory Last Values











WBC  6.9 Th/cmm (4.8-10.8)   19  05:30    


 


RBC  4.39 Mil/cmm (4.30-5.70)   19  05:30    


 


Hgb  10.2 gm/dL (12-16)  L  19  05:30    


 


Hct  31.6 % (41.0-60)  L  19  05:30    


 


MCV  72.0 fl (80-99)  L  19  05:30    


 


MCH  23.3 pg (26.0-30.0)  L  19  05:30    


 


MCHC Differential  32.3 pg (28.0-36.0)   19  05:30    


 


RDW  14.8 % (11.5-20.0)   19  05:30    


 


Plt Count  427 Th/cmm (150-400)  H  19  05:30    


 


MPV  7.5 fl  19  05:30    


 


Neutrophils %  55.3 % (40.0-80.0)   19  05:30    


 


Lymphocytes %  29.8 % (20.0-50.0)   19  05:30    


 


Monocytes %  12.1 % (2.0-10.0)  H  19  05:30    


 


Eosinophils %  2.0 % (0.0-5.0)   19  05:30    


 


Basophils %  0.8 % (0.0-2.0)   19  05:30    


 


Sodium  135 mEq/L (136-145)  L  19  05:30    


 


Potassium  3.9 mEq/L (3.5-5.1)   19  05:30    


 


Chloride  100 mEq/L ()   19  05:30    


 


Carbon Dioxide  23.8 mEq/L (21.0-31.0)   19  05:30    


 


Anion Gap  15.1  (7.0-16.0)   19  05:30    


 


BUN  15 mg/dL (7-25)   19  05:30    


 


Creatinine  0.7 mg/dL (0.7-1.3)   19  05:30    


 


Est GFR ( Amer)  > 60.0 ml/min (>90)   19  05:30    


 


Est GFR (Non-Af Amer)  > 60.0 ml/min  19  05:30    


 


BUN/Creatinine Ratio  21.4   19  05:30    


 


Glucose  314 mg/dL ()  H  19  05:30    


 


POC Glucose  332 MG/DL (70 - 105)  H  19  16:17    


 


Calcium  9.1 mg/dL (8.6-10.3)   19  05:30    


 


Phosphorus  3.3 mg/dL (2.5-5.0)   19  12:15    


 


Magnesium  1.9 mg/dL (1.9-2.7)   19  12:15    


 


Total Bilirubin  0.2 mg/dL (0.3-1.0)  L  19  12:15    


 


AST  31 U/L (13-39)   19  12:15    


 


ALT  30 U/L (7-52)   19  12:15    


 


Alkaline Phosphatase  78 U/L ()   19  12:15    


 


Troponin I  0.01 ng/mL (0.01-0.05)   19  12:15    


 


B-Natriuretic Peptide  5.8 pg/mL (5.0-100.0)   19  12:15    


 


Total Protein  7.2 gm/dL (6.0-8.3)   19  12:15    


 


Albumin  4.1 gm/dL (4.2-5.5)  L  19  12:15    


 


Globulin  3.1 gm/dL  19  12:15    


 


Albumin/Globulin Ratio  1.3  (1.0-1.8)   19  12:15    


 


Urine Source  CLEAN C   19  14:15    


 


Urine Color  YELLOW   19  14:15    


 


Urine Clarity  CLEAR  (CLEAR)   19  14:15    


 


Urine pH  6.0  (4.6 - 8.0)   19  14:15    


 


Ur Specific Gravity  1.020  (1.005-1.030)   19  14:15    


 


Urine Protein  NEGATIVE mg/dL (NEGATIVE)   19  14:15    


 


Urine Glucose (UA)  >=1000 mg/dL (NEGATIVE)  H  19  14:15    


 


Urine Ketones  NEGATIVE mg/dL (NEGATIVE)   19  14:15    


 


Urine Blood  NEGATIVE  (NEGATIVE)   19  14:15    


 


Urine Nitrate  NEGATIVE  (NEGATIVE)   19  14:15    


 


Urine Bilirubin  NEGATIVE  (NEGATIVE)   19  14:15    


 


Urine Urobilinogen  0.2 E.U./dL (0.2 - 1.0)   19  14:15    


 


Ur Leukocyte Esterase  NEGATIVE  (NEGATIVE)   19  14:15    


 


Urine RBC  0-2 /hpf (0-5)  H  19  14:15    


 


Urine WBC  2-5 /hpf (0-5)   19  14:15    


 


Ur Epithelial Cells  FEW /lpf (FEW)   19  14:15    


 


Urine Bacteria  FEW /hpf (NONE SEEN)   19  14:15    


 


Urine Opiates Screen  POSITIVE  (NEGATIVE)  H  19  14:15    


 


Urine Methadone Screen  NEGATIVE  (NEGATIVE)   19  14:15    


 


Ur Barbiturates Screen  NEGATIVE  (NEGATIVE)   19  14:15    


 


Ur Tricyclics Screen  NEGATIVE  (NEGATIVE)   19  14:15    


 


Ur Phencyclidine Scrn  NEGATIVE  (NEGATIVE)   19  14:15    


 


Amphetamines Screen  NEGATIVE  (NEGATIVE)   19  14:15    


 


U Methamphetamines Scrn  NEGATIVE  (NEGATIVE)   19  14:15    


 


U Benzodiazepines Scrn  NEGATIVE  (NEGATIVE)   19  14:15    


 


U Cocaine Metab Screen  NEGATIVE  (NEGATIVE)   19  14:15    


 


U Cannabinoids Screen  NEGATIVE  (NEGATIVE)   19  14:15    














- Physical Exam


Vitals and I&O: 


 Vital Signs











Temp  97.4 F   19 16:32


 


Pulse  88   19 16:32


 


Resp  19   19 16:32


 


BP  128/70   19 16:32


 


Pulse Ox  95   19 16:32








 Intake & Output











 19





 18:59 06:59 18:59


 


Intake Total 1050 889.167 


 


Output Total  1200 


 


Balance 1050 -310.833 


 


Weight (lbs)  128.503 kg 


 


Intake:   


 


  Intake, IV Amount 1050 889.167 


 


    Sodium Chloride 0.9% 1, 1000 889.167 





    000 ml @ 50 mls/hr IV .   





    Q20H [facility] Rx#:[id number]   


 


    cefTRIAXone 1 gm In 50  





    Sodium Chloride 0.9% 50   





    ml @ 100 mls/hr IV Q24HR   





    [facility] Rx#:[id number]   


 


Output:   


 


  Urine  1200 


 


Other:   


 


  # Bowel Movements  1 


 


  Weight Source  Bedscale 











Active Medications: 


Current Medications





Acetaminophen (Tylenol)  650 mg PO Q4HR PRN


   PRN Reason: PAIN OR FEVER >100.4


   Stop: 19 19:09


Acetaminophen/Hydrocodone Bitart (Norco 5mg/325mg)  1 tab PO Q6H PRN


   PRN Reason: mild pain


   Stop: 19 19:09


   Last Admin: 19 13:28 Dose:  1 tab


Albuterol Sulfate (Albuterol 2.5mg/3ml Neb Ud)  2.5 mg HHN Q4HRT PRN


   PRN Reason: Shortness of Breath


   Stop: 19 19:09


   Last Admin: 19 15:36 Dose:  2.5 mg


Aspirin (Aspirin Chewable)  81 mg PO DAILY [facility]


   Stop: 19 08:59


   Last Admin: 19 08:27 Dose:  81 mg


Benzocaine/Menthol (Cepacol)  1 josiah MM Q4HR PRN


   PRN Reason: Sore Throat


   Stop: 19 19:09


Bisacodyl (Dulcolax 10 Mg Supp)  10 mg RC DAILY PRN


   PRN Reason: Constipation


   Stop: 19 19:09


Camphor/Menthol (Bengay Greaseless 10%-15%)  1 appl TP Q4HR PRN


   PRN Reason: Pain (Mild)


   Stop: 19 19:09


Clopidogrel Bisulfate (Plavix)  75 mg PO DAILY [facility]


   Stop: 19 08:59


   Last Admin: 19 08:27 Dose:  75 mg


Docusate Sodium (Colace)  100 mg PO DAILY PRN


   PRN Reason: Constipation


   Stop: 19 19:09


Famotidine (Pepcid)  20 mg PO BID [facility]


   Stop: 19 08:59


   Last Admin: 19 16:25 Dose:  20 mg


Furosemide (Lasix)  20 mg PO DAILY [facility]


   Stop: 19 08:59


   Last Admin: 19 08:26 Dose:  20 mg


Gabapentin (Neurontin)  400 mg PO TID [facility]


   Stop: 19 20:59


   Last Admin: 19 13:25 Dose:  400 mg


Gabapentin (Neurontin)  600 mg PO BID [facility]


   Stop: 19 08:59


   Last Admin: 19 16:25 Dose:  600 mg


Gemfibrozil (Lopid)  600 mg PO BIDAC [facility]


   Stop: 19 07:29


   Last Admin: 19 16:25 Dose:  600 mg


Glucagon (Glucagen)  1 mg IM PRN PRN


   PRN Reason: IF BS <70


   Stop: 19 19:09


Guaifenesin (Robitussin)  200 mg PO Q4HR PRN


   PRN Reason: Cough or Congestion


   Stop: 19 17:44


Hydralazine HCl (Apresoline)  50 mg PO TID [facility]


   Stop: 19 20:59


   Last Admin: 19 13:26 Dose:  50 mg


Sodium Chloride (Nacl 0.9%)  1,000 mls @ 50 mls/hr IV .Q20H [facility]


   Stop: 19 17:44


   Last Admin: 19 05:48 Dose:  50 mls/hr


Ceftriaxone Sodium 1 gm/ (Sodium Chloride)  50 mls @ 100 mls/hr IV Q24HR [facility]


   Stop: 19 08:59


   Last Admin: 19 08:40 Dose:  100 mls/hr


Insulin Aspart (Novolog Insulin Sliding Scale)  0 units SUBQ ACHS [facility]; Protocol


   Stop: 19 20:59


   Last Admin: 19 12:08 Dose:  8 units


Insulin Detemir (Levemir Insulin)  20 units SUBQ QAM [facility]; Protocol


   Stop: 19 08:59


   Last Admin: 19 08:39 Dose:  20 units


Lactobacillus Rhamnosus (Culturelle 15b)  1 each PO DAILY [facility]


   Stop: 19 08:59


   Last Admin: 19 08:29 Dose:  1 each


Lactulose (Cephulac)  20 gm PO TID PRN


   PRN Reason: GI DISTRESS


   Stop: 19 07:40


Losartan Potassium (Cozaar)  100 mg PO DAILY [facility]


   Stop: 19 08:59


   Last Admin: 19 08:30 Dose:  100 mg


Magnesium Hydroxide (Milk Of Magnesia)  30 ml PO DAILY PRN


   PRN Reason: Constipation


   Stop: 19 19:09


Metformin HCl (Glucophage)  850 mg PO BID [facility]


   Stop: 19 08:59


   Last Admin: 19 16:25 Dose:  850 mg


Methocarbamol (Robaxin)  500 mg PO Q6H [facility]


   Stop: 19 19:14


   Last Admin: 19 13:54 Dose:  500 mg


Morphine Sulfate (Morphine)  2 mg IVP Q4HR PRN


   PRN Reason: Pain (Moderate)


   Stop: 19 17:47


   Last Admin: 19 18:17 Dose:  2 mg


Multivitamins/Vitamin C (Theragran)  1 tab PO DAILY [facility]


   Stop: 19 08:59


   Last Admin: 19 08:27 Dose:  1 tab


Nifedipine (Procardia Xl)  60 mg PO DAILY [facility]


   Stop: 19 08:59


   Last Admin: 19 08:28 Dose:  60 mg


Pantoprazole Sodium (Protonix)  40 mg PO DAILY [facility]


   Stop: 19 08:59


   Last Admin: 19 08:27 Dose:  40 mg


Potassium Chloride (Klor-Con)  20 meq PO DAILY [facility]


   Stop: 19 08:59


   Last Admin: 19 08:27 Dose:  20 meq


Rivaroxaban (Xarelto)  10 mg PO DAILY [facility]


   Stop: 19 08:59


   Last Admin: 19 08:31 Dose:  10 mg


Sodium Phosphate (Fleet Enema)  135 ml RC DAILY PRN


   PRN Reason: IF MOM OR DULCOLAX INEFFECTIVE


   Stop: 19 19:09


Terbinafine HCl (Lamisil 1% Cream)  1 appl TP BID [facility]


   Stop: 19 08:59


   Last Admin: 19 16:25 Dose:  Not Given


Zolpidem Tartrate (Ambien)  10 mg PO HS PRN


   PRN Reason: Insomnia


   Stop: 19 07:40


   Last Admin: 19 20:22 Dose:  10 mg








General: obese, other (coughing )


HEENT: NC/AT


Neck: Supple


Lungs: congested


Cardiovascular: RRR, Normal S1, Normal S2


Abdomen: soft, non-tender


Extremities: clear


Neurological: no change, alert





- Procedures


Procedures: 


 Procedures











Procedure Code Date


 


EXCISION OF DUODENUM, ENDO, DIAGN 6RW59SH 17


 


EXCISION OF STOMACH, ENDO, DIAGN [id number] 17


 


REPOSITION LEFT UPPER FEMUR WITH INT FIX, OPEN APPROACH [id number] 18


 


TREAT THIGH FRACTURE 96000 18














Internal Medicine Assmt/Plan





- Assessment


Assessment: 





pneumonia


acute respiratory failure


cough


sob


left sided weakness








- Plan


Plan: 





as per order sheet 





Nutritional Asmnt/Malnutr-PDOC





- Dietary Evaluation


Malnutrition Findings (Please click <Entered> for more info): 








Nutritional Asmnt/Malnutrition                             Start:  19 10:

11


Text:                                                      Status: Complete    

  


Freq:                                                                          

  


Protocol:                                                                      

  


 Document     19 10:11  RAUL  (Rec: 19 10:29  MMYOLANDA HILTON-

FNS1)


 Nutritional Asmnt/Malnutrition


     Patient General Information


      Nutritional Screening                      High Risk


                                                 Consult


      Diagnosis                                  RLL Pneumonia, anemia,


                                                 Dehydration, Uncontrolled DM


      Pertinent Medical Hx/Surgical Hx           HTN, peripheral vascular


                                                 disease, hx of stroke, hx of


                                                 hip fracture, obesity,


                                                 neuropathy, CAD.


      Subjective Information                     Consult received for Blood


                                                 glucose 314 on admission.


                                                 Patient asleep at time of


                                                 visit.


      Current Diet Order/ Nutrition Support      45 gm CCHO, ANNA MARIE


      Patient / S.O                              Not Indicated


      Pertinent Medications                      dulcolax, colace, pepcid,


                                                 lasix, glucagon, novolog,


                                                 levemir, novolin R, culturelle


                                                 , lactulose, cozaar, MOM,


                                                 Metformin, Theragran, protonix


                                                 , Klor, Fleet enema


      Pertinent Labs                             () Na 135, Glucose 271-314


     Nutritional Hx/Data


      Height                                     1.78 m


      Height (Calculated Centimeters)            177.8


      Current Weight (lbs)                       99.79 kg


      Weight (Calculated Kilograms)              99.8


      Weight (Calculated Grams)                  57028.3


      Ideal Body Weight                          166


      % Ideal Body Weight                        132


      Body Mass Index (BMI)                      31.5


      Recent Weight Change                       No


      Weight Status                              Obese


     GI Symptoms


      GI Symptoms                                None


      Last BM                                    none noted since admission.


      Difficult in:                              None


      Food Allergies                             No


      Cultural/Ethnic/Baptism Belief           None indicated


      Usual diet at home                         unknown


      Skin Integrity/Comment:                    Timothy 19, Intact


     Estimated Nutritional Goals


      BEE in Kcals:                              Using Current wt


                                                 Adj wt of IBW


      Calories/Kcals/Kg                          25-30 kcal/kg using 81.5kg Adj


                                                 wt


      Kcals Calculated                           ~1890-7531 kcal/day


      Protein:                                   Using Current wt


                                                 Adj wt of IBW


      Protein g/k.8-1 gm/kg


      Protein Calculated                         ~65-80 gm/day


      Fluid: ml                                  ~2729-0196 ml/day (1 ml/kcal)


     Nutritional Problem


      1. Problem


       Problem                                   Altered nutrition related lab


                                                 values


       Etiology                                  uncontrolled hyperglycemia aeb


       Signs/Symptoms:                           Glucose 271-314


     Intervention/Recommendation


      Comments                                   1. Consider modifying diet to


                                                 60gm CCHO due to patient's


                                                 size, Continue ANNA MARIE


                                                 modification as tolerated by


                                                 patient.


                                                 2. MD to continue to modify


                                                 insulin regimen as needed for


                                                 optimal glycemic control.


     Expected Outcomes/Goals


      Expected Outcomes/Goals                    Oral intake >75% of meals,


                                                 weight stable or trend toward


                                                 ideal body weight nutrition


                                                 related labs/glucose


                                                 normalizes


     Physician Parameters for PEM


      Body Mass Index (BMI)                      18 - 24 (Mild)
[phone]

## 2023-08-11 ENCOUNTER — HOSPITAL ENCOUNTER (INPATIENT)
Dept: HOSPITAL 4 - SED | Age: 58
LOS: 3 days | Discharge: NURSING, REHAB OR LONG-TERM CARE | DRG: 641 | End: 2023-08-14
Payer: COMMERCIAL

## 2023-08-11 VITALS
SYSTOLIC BLOOD PRESSURE: 130 MMHG | BODY MASS INDEX: 31.14 KG/M2 | OXYGEN SATURATION: 94 % | TEMPERATURE: 97 F | WEIGHT: 235 LBS | HEART RATE: 66 BPM | RESPIRATION RATE: 20 BRPM | HEIGHT: 73 IN

## 2023-08-11 VITALS
HEART RATE: 72 BPM | RESPIRATION RATE: 16 BRPM | OXYGEN SATURATION: 96 % | TEMPERATURE: 98.3 F | SYSTOLIC BLOOD PRESSURE: 138 MMHG

## 2023-08-11 VITALS
HEART RATE: 72 BPM | SYSTOLIC BLOOD PRESSURE: 138 MMHG | RESPIRATION RATE: 16 BRPM | OXYGEN SATURATION: 96 % | TEMPERATURE: 98.3 F

## 2023-08-11 DIAGNOSIS — E66.9: ICD-10-CM

## 2023-08-11 DIAGNOSIS — Z79.899: ICD-10-CM

## 2023-08-11 DIAGNOSIS — I50.9: ICD-10-CM

## 2023-08-11 DIAGNOSIS — R63.0: Primary | ICD-10-CM

## 2023-08-11 DIAGNOSIS — I11.0: ICD-10-CM

## 2023-08-11 DIAGNOSIS — G89.4: ICD-10-CM

## 2023-08-11 DIAGNOSIS — E44.1: ICD-10-CM

## 2023-08-11 DIAGNOSIS — Z79.82: ICD-10-CM

## 2023-08-11 DIAGNOSIS — F29: ICD-10-CM

## 2023-08-11 DIAGNOSIS — I69.354: ICD-10-CM

## 2023-08-11 DIAGNOSIS — E11.9: ICD-10-CM

## 2023-08-11 LAB
ACETONE EXG QL: NEGATIVE
ALBUMIN SERPL BCP-MCNC: 2.7 G/DL (ref 3.4–4.8)
ALT SERPL W P-5'-P-CCNC: 23 U/L (ref 12–78)
ANION GAP SERPL CALCULATED.3IONS-SCNC: 11 MMOL/L (ref 5–15)
AST SERPL W P-5'-P-CCNC: 21 U/L (ref 10–37)
BASOPHILS # BLD AUTO: 0.1 K/UL (ref 0–0.2)
BASOPHILS NFR BLD AUTO: 0.8 % (ref 0–2)
BILIRUB SERPL-MCNC: 0.2 MG/DL (ref 0–1)
BUN SERPL-MCNC: 21 MG/DL (ref 8–21)
CALCIUM SERPL-MCNC: 8.7 MG/DL (ref 8.4–11)
CHLORIDE SERPL-SCNC: 104 MMOL/L (ref 98–107)
CK SERPL-CCNC: 17 U/L (ref 39–308)
CO2 SERPLBLD-SCNC: 22 MMOL/L (ref 23–29)
CREAT SERPL-MCNC: 0.66 MG/DL (ref 0.55–1.3)
EOSINOPHIL # BLD AUTO: 0.1 K/UL (ref 0–0.4)
EOSINOPHIL NFR BLD AUTO: 1 % (ref 0–4)
ERYTHROCYTE [DISTWIDTH] IN BLOOD BY AUTOMATED COUNT: 13.8 % (ref 9–15)
GFR SERPL CREATININE-BSD FRML MDRD: 159 ML/MIN (ref 90–?)
GLUCOSE SERPL-MCNC: 109 MG/DL (ref 74–106)
HCT VFR BLD AUTO: 40.6 % (ref 36–54)
HGB BLD-MCNC: 13.5 G/DL (ref 14–18)
INR PPP: 1.1 (ref 0.8–1.2)
LYMPHOCYTES # BLD AUTO: 2 K/UL (ref 1–5.5)
LYMPHOCYTES NFR BLD AUTO: 18.4 % (ref 20.5–51.5)
MCH RBC QN AUTO: 30 PG (ref 27–31)
MCHC RBC AUTO-ENTMCNC: 33 % (ref 32–36)
MCV RBC AUTO: 89 FL (ref 79–98)
MONOCYTES # BLD MANUAL: 0.7 K/UL (ref 0–1)
MONOCYTES # BLD MANUAL: 6.7 % (ref 1.7–9.3)
NEUTROPHILS # BLD AUTO: 7.8 K/UL (ref 1.8–7.7)
NEUTROPHILS NFR BLD AUTO: 73.1 % (ref 40–70)
PLATELET # BLD AUTO: 503 K/UL (ref 130–430)
POTASSIUM SERPL-SCNC: 3.1 MMOL/L (ref 3.5–5.1)
PROT SERPL-MCNC: 7.9 G/DL (ref 6.4–8.3)
PROTHROMBIN TIME: 11.2 SECS (ref 9.5–12.5)
RBC # BLD AUTO: 4.56 MIL/UL (ref 4.2–6.2)
SODIUM SERPLBLD-SCNC: 137 MMOL/L (ref 136–145)
T4 FREE SERPL-MCNC: 1.2 NG/DL (ref 0.8–1.5)
TSH SERPL DL<=0.05 MIU/L-ACNC: 1.76 UIU/ML (ref 0.36–3.74)
WBC # BLD AUTO: 10.6 K/UL (ref 4.8–10.8)

## 2023-08-12 VITALS
SYSTOLIC BLOOD PRESSURE: 140 MMHG | RESPIRATION RATE: 16 BRPM | OXYGEN SATURATION: 96 % | HEART RATE: 74 BPM | TEMPERATURE: 98.1 F

## 2023-08-12 VITALS
RESPIRATION RATE: 16 BRPM | SYSTOLIC BLOOD PRESSURE: 174 MMHG | TEMPERATURE: 98.5 F | HEART RATE: 78 BPM | OXYGEN SATURATION: 96 %

## 2023-08-12 VITALS
SYSTOLIC BLOOD PRESSURE: 164 MMHG | RESPIRATION RATE: 16 BRPM | TEMPERATURE: 98.7 F | HEART RATE: 93 BPM | OXYGEN SATURATION: 98 %

## 2023-08-12 VITALS
HEART RATE: 97 BPM | RESPIRATION RATE: 14 BRPM | SYSTOLIC BLOOD PRESSURE: 141 MMHG | OXYGEN SATURATION: 97 % | TEMPERATURE: 97.2 F

## 2023-08-12 VITALS
HEART RATE: 77 BPM | TEMPERATURE: 98.2 F | OXYGEN SATURATION: 97 % | SYSTOLIC BLOOD PRESSURE: 144 MMHG | RESPIRATION RATE: 16 BRPM

## 2023-08-12 VITALS
SYSTOLIC BLOOD PRESSURE: 144 MMHG | RESPIRATION RATE: 18 BRPM | HEART RATE: 82 BPM | TEMPERATURE: 98 F | OXYGEN SATURATION: 96 %

## 2023-08-12 LAB
ALBUMIN SERPL BCP-MCNC: 2.6 G/DL (ref 3.4–4.8)
ALT SERPL W P-5'-P-CCNC: 25 U/L (ref 12–78)
ANION GAP SERPL CALCULATED.3IONS-SCNC: 10 MMOL/L (ref 5–15)
APPEARANCE UR: CLEAR
AST SERPL W P-5'-P-CCNC: 26 U/L (ref 10–37)
BASOPHILS # BLD AUTO: 0.1 K/UL (ref 0–0.2)
BASOPHILS NFR BLD AUTO: 0.6 % (ref 0–2)
BILIRUB SERPL-MCNC: 0.3 MG/DL (ref 0–1)
BILIRUB UR QL STRIP: NEGATIVE
BUN SERPL-MCNC: 16 MG/DL (ref 8–21)
CALCIUM SERPL-MCNC: 8.5 MG/DL (ref 8.4–11)
CHLORIDE SERPL-SCNC: 105 MMOL/L (ref 98–107)
CO2 SERPLBLD-SCNC: 23 MMOL/L (ref 23–29)
COLOR UR: YELLOW
CREAT SERPL-MCNC: 0.65 MG/DL (ref 0.55–1.3)
EOSINOPHIL # BLD AUTO: 0.1 K/UL (ref 0–0.4)
EOSINOPHIL NFR BLD AUTO: 0.9 % (ref 0–4)
ERYTHROCYTE [DISTWIDTH] IN BLOOD BY AUTOMATED COUNT: 13.9 % (ref 9–15)
GFR SERPL CREATININE-BSD FRML MDRD: 162 ML/MIN (ref 90–?)
GLUCOSE SERPL-MCNC: 188 MG/DL (ref 74–106)
GLUCOSE UR STRIP-MCNC: NEGATIVE MG/DL
HCT VFR BLD AUTO: 39.7 % (ref 36–54)
HGB BLD-MCNC: 13.4 G/DL (ref 14–18)
HGB UR QL STRIP: NEGATIVE
KETONES UR STRIP-MCNC: NEGATIVE MG/DL
LEUKOCYTE ESTERASE UR QL STRIP: NEGATIVE
LIPASE SERPL-CCNC: 147 U/L (ref 73–393)
LYMPHOCYTES # BLD AUTO: 1.4 K/UL (ref 1–5.5)
LYMPHOCYTES NFR BLD AUTO: 14.3 % (ref 20.5–51.5)
MCH RBC QN AUTO: 30 PG (ref 27–31)
MCHC RBC AUTO-ENTMCNC: 34 % (ref 32–36)
MCV RBC AUTO: 88 FL (ref 79–98)
MONOCYTES # BLD MANUAL: 0.8 K/UL (ref 0–1)
MONOCYTES # BLD MANUAL: 7.6 % (ref 1.7–9.3)
NEUTROPHILS # BLD AUTO: 7.8 K/UL (ref 1.8–7.7)
NEUTROPHILS NFR BLD AUTO: 76.6 % (ref 40–70)
NITRITE UR QL STRIP: NEGATIVE
PH UR STRIP: 6 [PH] (ref 5–8)
PLATELET # BLD AUTO: 539 K/UL (ref 130–430)
POTASSIUM SERPL-SCNC: 3.1 MMOL/L (ref 3.5–5.1)
PROT SERPL-MCNC: 8 G/DL (ref 6.4–8.3)
PROT UR QL STRIP: (no result)
RBC # BLD AUTO: 4.51 MIL/UL (ref 4.2–6.2)
RBC #/AREA URNS HPF: (no result) /HPF (ref 0–3)
SODIUM SERPLBLD-SCNC: 138 MMOL/L (ref 136–145)
SP GR UR STRIP: 1.01 (ref 1–1.03)
UROBILINOGEN UR STRIP-MCNC: 0.2 MG/DL (ref 0.2–1)
WBC # BLD AUTO: 10.1 K/UL (ref 4.8–10.8)
WBC #/AREA URNS HPF: (no result) /HPF (ref 0–3)

## 2023-08-12 RX ADMIN — CARVEDILOL SCH MG: 25 TABLET, FILM COATED ORAL at 08:39

## 2023-08-12 RX ADMIN — FERROUS GLUCONATE TAB 324 MG (37.5 MG ELEMENTAL IRON) SCH MG: 324 (37.5 FE) TAB at 08:40

## 2023-08-12 RX ADMIN — CHOLESTYRAMINE SCH GM: 4 POWDER, FOR SUSPENSION ORAL at 17:10

## 2023-08-12 RX ADMIN — HUMAN INSULIN PRN UNITS: 100 INJECTION, SOLUTION SUBCUTANEOUS at 17:06

## 2023-08-12 RX ADMIN — OXYCODONE HYDROCHLORIDE AND ACETAMINOPHEN SCH MG: 500 TABLET ORAL at 08:41

## 2023-08-12 RX ADMIN — DOCUSATE SODIUM LIQUID SCH MG: 100 LIQUID ORAL at 22:13

## 2023-08-12 RX ADMIN — PREGABALIN SCH MG: 25 CAPSULE ORAL at 08:40

## 2023-08-12 RX ADMIN — BETHANECHOL CHLORIDE SCH MG: 25 TABLET ORAL at 09:36

## 2023-08-12 RX ADMIN — MULTIVITAMIN TABLET SCH TAB: TABLET at 08:41

## 2023-08-12 RX ADMIN — FUROSEMIDE SCH MG: 20 TABLET ORAL at 08:40

## 2023-08-12 RX ADMIN — INSULIN GLARGINE SCH UNITS: 100 INJECTION, SOLUTION SUBCUTANEOUS at 09:27

## 2023-08-12 RX ADMIN — BETHANECHOL CHLORIDE SCH MG: 25 TABLET ORAL at 17:11

## 2023-08-12 RX ADMIN — DOCUSATE SODIUM LIQUID SCH MG: 100 LIQUID ORAL at 21:00

## 2023-08-12 RX ADMIN — Medication SCH EA: at 09:22

## 2023-08-12 RX ADMIN — DULOXETINE HYDROCHLORIDE SCH MG: 30 CAPSULE, DELAYED RELEASE ORAL at 08:40

## 2023-08-12 RX ADMIN — DOCUSATE SODIUM LIQUID SCH MG: 100 LIQUID ORAL at 08:40

## 2023-08-12 RX ADMIN — Medication SCH EA: at 16:53

## 2023-08-12 RX ADMIN — Medication SCH EA: at 12:05

## 2023-08-12 RX ADMIN — OXYCODONE AND ACETAMINOPHEN PRN TAB: 10; 325 TABLET ORAL at 06:28

## 2023-08-12 RX ADMIN — BETHANECHOL CHLORIDE SCH MG: 25 TABLET ORAL at 22:12

## 2023-08-12 RX ADMIN — Medication SCH MG: at 08:41

## 2023-08-12 RX ADMIN — CHOLESTYRAMINE SCH GM: 4 POWDER, FOR SUSPENSION ORAL at 21:00

## 2023-08-12 RX ADMIN — CHOLESTYRAMINE SCH GM: 4 POWDER, FOR SUSPENSION ORAL at 22:13

## 2023-08-12 RX ADMIN — PREGABALIN SCH MG: 25 CAPSULE ORAL at 22:13

## 2023-08-12 RX ADMIN — Medication SCH EA: at 17:03

## 2023-08-12 RX ADMIN — DULOXETINE HYDROCHLORIDE SCH MG: 30 CAPSULE, DELAYED RELEASE ORAL at 22:12

## 2023-08-12 RX ADMIN — OXYCODONE AND ACETAMINOPHEN PRN TAB: 10; 325 TABLET ORAL at 15:12

## 2023-08-12 RX ADMIN — INSULIN GLARGINE SCH UNITS: 100 INJECTION, SOLUTION SUBCUTANEOUS at 22:33

## 2023-08-12 RX ADMIN — CHOLESTYRAMINE SCH GM: 4 POWDER, FOR SUSPENSION ORAL at 08:40

## 2023-08-12 RX ADMIN — CARVEDILOL SCH MG: 25 TABLET, FILM COATED ORAL at 22:12

## 2023-08-12 RX ADMIN — Medication SCH EA: at 22:11

## 2023-08-13 VITALS
SYSTOLIC BLOOD PRESSURE: 160 MMHG | HEART RATE: 74 BPM | OXYGEN SATURATION: 98 % | TEMPERATURE: 97.4 F | RESPIRATION RATE: 18 BRPM

## 2023-08-13 VITALS
OXYGEN SATURATION: 96 % | RESPIRATION RATE: 16 BRPM | HEART RATE: 77 BPM | SYSTOLIC BLOOD PRESSURE: 158 MMHG | TEMPERATURE: 97.6 F

## 2023-08-13 VITALS
TEMPERATURE: 97.7 F | HEART RATE: 77 BPM | RESPIRATION RATE: 18 BRPM | SYSTOLIC BLOOD PRESSURE: 152 MMHG | OXYGEN SATURATION: 96 %

## 2023-08-13 VITALS
OXYGEN SATURATION: 98 % | SYSTOLIC BLOOD PRESSURE: 146 MMHG | TEMPERATURE: 98 F | HEART RATE: 77 BPM | RESPIRATION RATE: 16 BRPM

## 2023-08-13 VITALS
OXYGEN SATURATION: 96 % | SYSTOLIC BLOOD PRESSURE: 156 MMHG | HEART RATE: 77 BPM | TEMPERATURE: 97.6 F | RESPIRATION RATE: 16 BRPM

## 2023-08-13 VITALS
SYSTOLIC BLOOD PRESSURE: 158 MMHG | RESPIRATION RATE: 16 BRPM | HEART RATE: 77 BPM | TEMPERATURE: 97.6 F | OXYGEN SATURATION: 98 %

## 2023-08-13 VITALS — OXYGEN SATURATION: 98 %

## 2023-08-13 RX ADMIN — OXYCODONE AND ACETAMINOPHEN PRN TAB: 10; 325 TABLET ORAL at 16:18

## 2023-08-13 RX ADMIN — INSULIN GLARGINE SCH UNITS: 100 INJECTION, SOLUTION SUBCUTANEOUS at 10:11

## 2023-08-13 RX ADMIN — Medication SCH EA: at 16:23

## 2023-08-13 RX ADMIN — Medication SCH EA: at 06:23

## 2023-08-13 RX ADMIN — Medication SCH EA: at 21:59

## 2023-08-13 RX ADMIN — INSULIN GLARGINE SCH UNITS: 100 INJECTION, SOLUTION SUBCUTANEOUS at 22:09

## 2023-08-13 RX ADMIN — MULTIVITAMIN TABLET SCH TAB: TABLET at 09:48

## 2023-08-13 RX ADMIN — OXYCODONE HYDROCHLORIDE AND ACETAMINOPHEN SCH MG: 500 TABLET ORAL at 09:49

## 2023-08-13 RX ADMIN — Medication SCH EA: at 12:16

## 2023-08-13 RX ADMIN — DULOXETINE HYDROCHLORIDE SCH MG: 30 CAPSULE, DELAYED RELEASE ORAL at 21:57

## 2023-08-13 RX ADMIN — FERROUS GLUCONATE TAB 324 MG (37.5 MG ELEMENTAL IRON) SCH MG: 324 (37.5 FE) TAB at 09:56

## 2023-08-13 RX ADMIN — FUROSEMIDE SCH MG: 20 TABLET ORAL at 09:49

## 2023-08-13 RX ADMIN — HUMAN INSULIN PRN UNITS: 100 INJECTION, SOLUTION SUBCUTANEOUS at 22:08

## 2023-08-13 RX ADMIN — BETHANECHOL CHLORIDE SCH MG: 25 TABLET ORAL at 16:27

## 2023-08-13 RX ADMIN — CARVEDILOL SCH MG: 25 TABLET, FILM COATED ORAL at 21:57

## 2023-08-13 RX ADMIN — CHOLESTYRAMINE SCH GM: 4 POWDER, FOR SUSPENSION ORAL at 21:59

## 2023-08-13 RX ADMIN — Medication SCH MG: at 09:47

## 2023-08-13 RX ADMIN — BETHANECHOL CHLORIDE SCH MG: 25 TABLET ORAL at 21:59

## 2023-08-13 RX ADMIN — GABAPENTIN SCH MG: 300 CAPSULE ORAL at 21:58

## 2023-08-13 RX ADMIN — GABAPENTIN SCH MG: 300 CAPSULE ORAL at 09:48

## 2023-08-13 RX ADMIN — CHOLESTYRAMINE SCH GM: 4 POWDER, FOR SUSPENSION ORAL at 16:26

## 2023-08-13 RX ADMIN — PREGABALIN SCH MG: 25 CAPSULE ORAL at 21:57

## 2023-08-13 RX ADMIN — CARVEDILOL SCH MG: 25 TABLET, FILM COATED ORAL at 09:49

## 2023-08-13 RX ADMIN — BETHANECHOL CHLORIDE SCH MG: 25 TABLET ORAL at 09:47

## 2023-08-13 RX ADMIN — DOCUSATE SODIUM LIQUID SCH MG: 100 LIQUID ORAL at 21:56

## 2023-08-13 RX ADMIN — DOCUSATE SODIUM LIQUID SCH MG: 100 LIQUID ORAL at 09:00

## 2023-08-13 RX ADMIN — DULOXETINE HYDROCHLORIDE SCH MG: 30 CAPSULE, DELAYED RELEASE ORAL at 09:49

## 2023-08-13 RX ADMIN — PREGABALIN SCH MG: 25 CAPSULE ORAL at 09:49

## 2023-08-13 RX ADMIN — CHOLESTYRAMINE SCH GM: 4 POWDER, FOR SUSPENSION ORAL at 09:47

## 2023-08-14 VITALS
OXYGEN SATURATION: 94 % | RESPIRATION RATE: 20 BRPM | TEMPERATURE: 97.6 F | HEART RATE: 79 BPM | SYSTOLIC BLOOD PRESSURE: 150 MMHG

## 2023-08-14 VITALS
HEART RATE: 76 BPM | OXYGEN SATURATION: 97 % | SYSTOLIC BLOOD PRESSURE: 158 MMHG | RESPIRATION RATE: 17 BRPM | TEMPERATURE: 98 F

## 2023-08-14 VITALS
TEMPERATURE: 98.1 F | OXYGEN SATURATION: 97 % | SYSTOLIC BLOOD PRESSURE: 141 MMHG | HEART RATE: 79 BPM | RESPIRATION RATE: 18 BRPM

## 2023-08-14 VITALS
RESPIRATION RATE: 15 BRPM | HEART RATE: 77 BPM | OXYGEN SATURATION: 98 % | SYSTOLIC BLOOD PRESSURE: 147 MMHG | TEMPERATURE: 98 F

## 2023-08-14 VITALS — OXYGEN SATURATION: 97 %

## 2023-08-14 RX ADMIN — BETHANECHOL CHLORIDE SCH MG: 25 TABLET ORAL at 15:04

## 2023-08-14 RX ADMIN — Medication SCH EA: at 05:44

## 2023-08-14 RX ADMIN — BETHANECHOL CHLORIDE SCH MG: 25 TABLET ORAL at 10:01

## 2023-08-14 RX ADMIN — DULOXETINE HYDROCHLORIDE SCH MG: 30 CAPSULE, DELAYED RELEASE ORAL at 10:01

## 2023-08-14 RX ADMIN — FUROSEMIDE SCH MG: 20 TABLET ORAL at 10:02

## 2023-08-14 RX ADMIN — Medication SCH MG: at 10:02

## 2023-08-14 RX ADMIN — CARVEDILOL SCH MG: 25 TABLET, FILM COATED ORAL at 10:02

## 2023-08-14 RX ADMIN — FERROUS GLUCONATE TAB 324 MG (37.5 MG ELEMENTAL IRON) SCH MG: 324 (37.5 FE) TAB at 10:06

## 2023-08-14 RX ADMIN — MULTIVITAMIN TABLET SCH TAB: TABLET at 10:02

## 2023-08-14 RX ADMIN — GABAPENTIN SCH MG: 300 CAPSULE ORAL at 10:06

## 2023-08-14 RX ADMIN — OXYCODONE HYDROCHLORIDE AND ACETAMINOPHEN SCH MG: 500 TABLET ORAL at 10:01

## 2023-08-14 RX ADMIN — CHOLESTYRAMINE SCH GM: 4 POWDER, FOR SUSPENSION ORAL at 10:00

## 2023-08-14 RX ADMIN — PREGABALIN SCH MG: 25 CAPSULE ORAL at 10:02

## 2023-08-14 RX ADMIN — CHOLESTYRAMINE SCH GM: 4 POWDER, FOR SUSPENSION ORAL at 15:04

## 2023-08-14 RX ADMIN — DOCUSATE SODIUM LIQUID SCH MG: 100 LIQUID ORAL at 10:00

## 2023-08-14 RX ADMIN — INSULIN GLARGINE SCH UNITS: 100 INJECTION, SOLUTION SUBCUTANEOUS at 10:13

## 2023-08-14 RX ADMIN — Medication SCH EA: at 11:41

## 2023-08-14 RX ADMIN — OXYCODONE AND ACETAMINOPHEN PRN TAB: 10; 325 TABLET ORAL at 05:49

## 2023-12-06 ENCOUNTER — OFFICE (OUTPATIENT)
Dept: URBAN - METROPOLITAN AREA CLINIC 73 | Facility: CLINIC | Age: 58
End: 2023-12-06

## 2023-12-06 VITALS
TEMPERATURE: 98.1 F | SYSTOLIC BLOOD PRESSURE: 103 MMHG | HEIGHT: 70 IN | DIASTOLIC BLOOD PRESSURE: 66 MMHG | HEART RATE: 87 BPM

## 2023-12-06 DIAGNOSIS — R13.12 DYSPHAGIA, OROPHARYNGEAL PHASE: ICD-10-CM

## 2023-12-06 DIAGNOSIS — I63.9 CVA (CEREBRAL VASCULAR ACCIDENT): ICD-10-CM

## 2023-12-06 PROCEDURE — 99204 OFFICE O/P NEW MOD 45 MIN: CPT | Performed by: INTERNAL MEDICINE

## 2024-02-13 ENCOUNTER — Encounter (OUTPATIENT)
Dept: URBAN - METROPOLITAN AREA CLINIC 74 | Facility: CLINIC | Age: 59
End: 2024-02-13

## 2024-02-13 VITALS — HEIGHT: 70 IN

## 2024-02-13 DIAGNOSIS — R13.12 DYSPHAGIA, OROPHARYNGEAL PHASE: ICD-10-CM

## 2024-02-13 DIAGNOSIS — I63.9 CVA (CEREBRAL VASCULAR ACCIDENT): ICD-10-CM

## 2024-02-13 DIAGNOSIS — K44.9 HIATAL HERNIA: ICD-10-CM

## 2024-02-13 PROCEDURE — 99213 OFFICE O/P EST LOW 20 MIN: CPT | Performed by: INTERNAL MEDICINE

## 2024-02-13 PROCEDURE — G0406 INPT/TELE FOLLOW UP 15: HCPCS | Performed by: INTERNAL MEDICINE

## 2024-02-13 NOTE — SERVICEHPINOTES
Patient's follow-up today via telemedicine for dysphagia. History is obtained with help from patient's nurse from [facility].  Since patient's last visit he underwent upper endoscopy and found to have a 3 cm hiatal hernia.  Biopsies were taken shows mild chronic gastritis, and mild reflux esophagitis. The patient remains on pantoprazole 40 mg once a day. He still has mild dysphagia and continues to work with speech therapy.ASSESSMENT/PLAN:# Oropharyngeal dysphagia/history of CVA/hiatal hernia: Patient with oropharyngeal dysphagia based on history which is likely due to recent CVA.  Recent EGD without significant finding explain patient dysphagia.  He is already on pantoprazole for acid reflux which he should continue to take.  He had declining PEG placement if his dysphagia is worse.